# Patient Record
Sex: FEMALE | Race: BLACK OR AFRICAN AMERICAN | NOT HISPANIC OR LATINO | URBAN - METROPOLITAN AREA
[De-identification: names, ages, dates, MRNs, and addresses within clinical notes are randomized per-mention and may not be internally consistent; named-entity substitution may affect disease eponyms.]

---

## 2020-11-07 ENCOUNTER — INPATIENT (INPATIENT)
Facility: HOSPITAL | Age: 65
LOS: 4 days | Discharge: HOME | DRG: 65 | End: 2020-11-12
Attending: HOSPITALIST | Admitting: HOSPITALIST
Payer: MEDICARE

## 2020-11-07 VITALS
DIASTOLIC BLOOD PRESSURE: 103 MMHG | HEIGHT: 71 IN | TEMPERATURE: 98 F | SYSTOLIC BLOOD PRESSURE: 202 MMHG | OXYGEN SATURATION: 100 % | WEIGHT: 175.05 LBS | HEART RATE: 85 BPM | RESPIRATION RATE: 18 BRPM

## 2020-11-07 PROCEDURE — 99291 CRITICAL CARE FIRST HOUR: CPT

## 2020-11-07 NOTE — ED ADULT TRIAGE NOTE - CHIEF COMPLAINT QUOTE
patient brought in by family states bilateral heaviness to legs started in AM,  left arm weakness, feels like she can't grab her words, no droop noted, family states well 2300, patient brought to [facility] for evaluation of stroke patient brought in by family states bilateral heaviness to legs started in AM,  left arm weakness, feels like she can't grab her words, no droop noted, family states well 2300, patient brought to [facility] for evaluation of stroke, HX of DM patient brought in by family states bilateral heaviness to legs started in AM,  left arm weakness, feels like she can't grab her words, no droop noted, family states well 2300, patient brought to [facility] for evaluation of stroke, activated, HX of DM

## 2020-11-08 DIAGNOSIS — R53.1 WEAKNESS: ICD-10-CM

## 2020-11-08 LAB
A1C WITH ESTIMATED AVERAGE GLUCOSE RESULT: 7.9 % — HIGH (ref 4–5.6)
ALBUMIN SERPL ELPH-MCNC: 4.1 G/DL — SIGNIFICANT CHANGE UP (ref 3.3–5.2)
ALBUMIN SERPL ELPH-MCNC: 4.4 G/DL — SIGNIFICANT CHANGE UP (ref 3.3–5.2)
ALP SERPL-CCNC: 91 U/L — SIGNIFICANT CHANGE UP (ref 40–120)
ALP SERPL-CCNC: 93 U/L — SIGNIFICANT CHANGE UP (ref 40–120)
ALT FLD-CCNC: 12 U/L — SIGNIFICANT CHANGE UP
ALT FLD-CCNC: 13 U/L — SIGNIFICANT CHANGE UP
ANION GAP SERPL CALC-SCNC: 12 MMOL/L — SIGNIFICANT CHANGE UP (ref 5–17)
ANION GAP SERPL CALC-SCNC: 12 MMOL/L — SIGNIFICANT CHANGE UP (ref 5–17)
APPEARANCE UR: CLEAR — SIGNIFICANT CHANGE UP
APTT BLD: 34.5 SEC — SIGNIFICANT CHANGE UP (ref 27.5–35.5)
APTT BLD: 34.6 SEC — SIGNIFICANT CHANGE UP (ref 27.5–35.5)
AST SERPL-CCNC: 15 U/L — SIGNIFICANT CHANGE UP
AST SERPL-CCNC: 15 U/L — SIGNIFICANT CHANGE UP
BACTERIA # UR AUTO: ABNORMAL
BASOPHILS # BLD AUTO: 0.04 K/UL — SIGNIFICANT CHANGE UP (ref 0–0.2)
BASOPHILS # BLD AUTO: 0.05 K/UL — SIGNIFICANT CHANGE UP (ref 0–0.2)
BASOPHILS NFR BLD AUTO: 0.4 % — SIGNIFICANT CHANGE UP (ref 0–2)
BASOPHILS NFR BLD AUTO: 0.4 % — SIGNIFICANT CHANGE UP (ref 0–2)
BILIRUB SERPL-MCNC: <0.2 MG/DL — LOW (ref 0.4–2)
BILIRUB SERPL-MCNC: <0.2 MG/DL — LOW (ref 0.4–2)
BILIRUB UR-MCNC: NEGATIVE — SIGNIFICANT CHANGE UP
BLD GP AB SCN SERPL QL: SIGNIFICANT CHANGE UP
BUN SERPL-MCNC: 13 MG/DL — SIGNIFICANT CHANGE UP (ref 8–20)
BUN SERPL-MCNC: 16 MG/DL — SIGNIFICANT CHANGE UP (ref 8–20)
CALCIUM SERPL-MCNC: 9.5 MG/DL — SIGNIFICANT CHANGE UP (ref 8.6–10.2)
CALCIUM SERPL-MCNC: 9.9 MG/DL — SIGNIFICANT CHANGE UP (ref 8.6–10.2)
CHLORIDE SERPL-SCNC: 100 MMOL/L — SIGNIFICANT CHANGE UP (ref 98–107)
CHLORIDE SERPL-SCNC: 98 MMOL/L — SIGNIFICANT CHANGE UP (ref 98–107)
CHOLEST SERPL-MCNC: 202 MG/DL — HIGH
CO2 SERPL-SCNC: 25 MMOL/L — SIGNIFICANT CHANGE UP (ref 22–29)
CO2 SERPL-SCNC: 26 MMOL/L — SIGNIFICANT CHANGE UP (ref 22–29)
COLOR SPEC: YELLOW — SIGNIFICANT CHANGE UP
CREAT SERPL-MCNC: 0.76 MG/DL — SIGNIFICANT CHANGE UP (ref 0.5–1.3)
CREAT SERPL-MCNC: 0.93 MG/DL — SIGNIFICANT CHANGE UP (ref 0.5–1.3)
DIFF PNL FLD: ABNORMAL
EOSINOPHIL # BLD AUTO: 0.08 K/UL — SIGNIFICANT CHANGE UP (ref 0–0.5)
EOSINOPHIL # BLD AUTO: 0.13 K/UL — SIGNIFICANT CHANGE UP (ref 0–0.5)
EOSINOPHIL NFR BLD AUTO: 0.8 % — SIGNIFICANT CHANGE UP (ref 0–6)
EOSINOPHIL NFR BLD AUTO: 1 % — SIGNIFICANT CHANGE UP (ref 0–6)
EPI CELLS # UR: SIGNIFICANT CHANGE UP
ESTIMATED AVERAGE GLUCOSE: 180 MG/DL — HIGH (ref 68–114)
GLUCOSE BLDC GLUCOMTR-MCNC: 235 MG/DL — HIGH (ref 70–99)
GLUCOSE BLDC GLUCOMTR-MCNC: 278 MG/DL — HIGH (ref 70–99)
GLUCOSE BLDC GLUCOMTR-MCNC: 279 MG/DL — HIGH (ref 70–99)
GLUCOSE SERPL-MCNC: 220 MG/DL — HIGH (ref 70–99)
GLUCOSE SERPL-MCNC: 250 MG/DL — HIGH (ref 70–99)
GLUCOSE UR QL: 250 MG/DL
HCT VFR BLD CALC: 34.7 % — SIGNIFICANT CHANGE UP (ref 34.5–45)
HCT VFR BLD CALC: 36.9 % — SIGNIFICANT CHANGE UP (ref 34.5–45)
HCV AB S/CO SERPL IA: 0.07 S/CO — SIGNIFICANT CHANGE UP (ref 0–0.99)
HCV AB SERPL-IMP: SIGNIFICANT CHANGE UP
HDLC SERPL-MCNC: 39 MG/DL — LOW
HGB BLD-MCNC: 10.9 G/DL — LOW (ref 11.5–15.5)
HGB BLD-MCNC: 11.2 G/DL — LOW (ref 11.5–15.5)
IMM GRANULOCYTES NFR BLD AUTO: 0.2 % — SIGNIFICANT CHANGE UP (ref 0–1.5)
IMM GRANULOCYTES NFR BLD AUTO: 0.3 % — SIGNIFICANT CHANGE UP (ref 0–1.5)
INR BLD: 1 RATIO — SIGNIFICANT CHANGE UP (ref 0.88–1.16)
INR BLD: 1.05 RATIO — SIGNIFICANT CHANGE UP (ref 0.88–1.16)
KETONES UR-MCNC: NEGATIVE — SIGNIFICANT CHANGE UP
LEUKOCYTE ESTERASE UR-ACNC: NEGATIVE — SIGNIFICANT CHANGE UP
LIPID PNL WITH DIRECT LDL SERPL: 103 MG/DL — HIGH
LYMPHOCYTES # BLD AUTO: 3.65 K/UL — HIGH (ref 1–3.3)
LYMPHOCYTES # BLD AUTO: 35 % — SIGNIFICANT CHANGE UP (ref 13–44)
LYMPHOCYTES # BLD AUTO: 56.1 % — HIGH (ref 13–44)
LYMPHOCYTES # BLD AUTO: 6.99 K/UL — HIGH (ref 1–3.3)
MCHC RBC-ENTMCNC: 26.4 PG — LOW (ref 27–34)
MCHC RBC-ENTMCNC: 26.6 PG — LOW (ref 27–34)
MCHC RBC-ENTMCNC: 30.4 GM/DL — LOW (ref 32–36)
MCHC RBC-ENTMCNC: 31.4 GM/DL — LOW (ref 32–36)
MCV RBC AUTO: 84.6 FL — SIGNIFICANT CHANGE UP (ref 80–100)
MCV RBC AUTO: 87 FL — SIGNIFICANT CHANGE UP (ref 80–100)
MONOCYTES # BLD AUTO: 0.96 K/UL — HIGH (ref 0–0.9)
MONOCYTES # BLD AUTO: 1.2 K/UL — HIGH (ref 0–0.9)
MONOCYTES NFR BLD AUTO: 9.2 % — SIGNIFICANT CHANGE UP (ref 2–14)
MONOCYTES NFR BLD AUTO: 9.6 % — SIGNIFICANT CHANGE UP (ref 2–14)
NEUTROPHILS # BLD AUTO: 4.05 K/UL — SIGNIFICANT CHANGE UP (ref 1.8–7.4)
NEUTROPHILS # BLD AUTO: 5.66 K/UL — SIGNIFICANT CHANGE UP (ref 1.8–7.4)
NEUTROPHILS NFR BLD AUTO: 32.7 % — LOW (ref 43–77)
NEUTROPHILS NFR BLD AUTO: 54.3 % — SIGNIFICANT CHANGE UP (ref 43–77)
NITRITE UR-MCNC: NEGATIVE — SIGNIFICANT CHANGE UP
NON HDL CHOLESTEROL: 163 MG/DL — HIGH
PH UR: 7 — SIGNIFICANT CHANGE UP (ref 5–8)
PLATELET # BLD AUTO: 282 K/UL — SIGNIFICANT CHANGE UP (ref 150–400)
PLATELET # BLD AUTO: 299 K/UL — SIGNIFICANT CHANGE UP (ref 150–400)
POTASSIUM SERPL-MCNC: 4.4 MMOL/L — SIGNIFICANT CHANGE UP (ref 3.5–5.3)
POTASSIUM SERPL-MCNC: 4.4 MMOL/L — SIGNIFICANT CHANGE UP (ref 3.5–5.3)
POTASSIUM SERPL-SCNC: 4.4 MMOL/L — SIGNIFICANT CHANGE UP (ref 3.5–5.3)
POTASSIUM SERPL-SCNC: 4.4 MMOL/L — SIGNIFICANT CHANGE UP (ref 3.5–5.3)
PROT SERPL-MCNC: 8 G/DL — SIGNIFICANT CHANGE UP (ref 6.6–8.7)
PROT SERPL-MCNC: 8.2 G/DL — SIGNIFICANT CHANGE UP (ref 6.6–8.7)
PROT UR-MCNC: 100 MG/DL
PROTHROM AB SERPL-ACNC: 11.6 SEC — SIGNIFICANT CHANGE UP (ref 10.6–13.6)
PROTHROM AB SERPL-ACNC: 12.2 SEC — SIGNIFICANT CHANGE UP (ref 10.6–13.6)
RBC # BLD: 4.1 M/UL — SIGNIFICANT CHANGE UP (ref 3.8–5.2)
RBC # BLD: 4.24 M/UL — SIGNIFICANT CHANGE UP (ref 3.8–5.2)
RBC # FLD: 13.6 % — SIGNIFICANT CHANGE UP (ref 10.3–14.5)
RBC # FLD: 13.7 % — SIGNIFICANT CHANGE UP (ref 10.3–14.5)
RBC CASTS # UR COMP ASSIST: SIGNIFICANT CHANGE UP /HPF (ref 0–4)
SARS-COV-2 IGG SERPL QL IA: NEGATIVE — SIGNIFICANT CHANGE UP
SARS-COV-2 IGM SERPL IA-ACNC: 0.09 INDEX — SIGNIFICANT CHANGE UP
SARS-COV-2 RNA SPEC QL NAA+PROBE: SIGNIFICANT CHANGE UP
SODIUM SERPL-SCNC: 135 MMOL/L — SIGNIFICANT CHANGE UP (ref 135–145)
SODIUM SERPL-SCNC: 138 MMOL/L — SIGNIFICANT CHANGE UP (ref 135–145)
SP GR SPEC: 1 — LOW (ref 1.01–1.02)
TRIGL SERPL-MCNC: 300 MG/DL — HIGH
TROPONIN T SERPL-MCNC: <0.01 NG/ML — SIGNIFICANT CHANGE UP (ref 0–0.06)
TSH SERPL-MCNC: 0.9 UIU/ML — SIGNIFICANT CHANGE UP (ref 0.27–4.2)
UROBILINOGEN FLD QL: NEGATIVE MG/DL — SIGNIFICANT CHANGE UP
WBC # BLD: 10.42 K/UL — SIGNIFICANT CHANGE UP (ref 3.8–10.5)
WBC # BLD: 12.45 K/UL — HIGH (ref 3.8–10.5)
WBC # FLD AUTO: 10.42 K/UL — SIGNIFICANT CHANGE UP (ref 3.8–10.5)
WBC # FLD AUTO: 12.45 K/UL — HIGH (ref 3.8–10.5)
WBC UR QL: SIGNIFICANT CHANGE UP

## 2020-11-08 PROCEDURE — 12345: CPT | Mod: NC

## 2020-11-08 PROCEDURE — 93010 ELECTROCARDIOGRAM REPORT: CPT

## 2020-11-08 PROCEDURE — 0042T: CPT

## 2020-11-08 PROCEDURE — 70498 CT ANGIOGRAPHY NECK: CPT | Mod: 26

## 2020-11-08 PROCEDURE — 93880 EXTRACRANIAL BILAT STUDY: CPT | Mod: 26

## 2020-11-08 PROCEDURE — 93306 TTE W/DOPPLER COMPLETE: CPT | Mod: 26

## 2020-11-08 PROCEDURE — 99223 1ST HOSP IP/OBS HIGH 75: CPT

## 2020-11-08 PROCEDURE — 70496 CT ANGIOGRAPHY HEAD: CPT | Mod: 26

## 2020-11-08 PROCEDURE — 70551 MRI BRAIN STEM W/O DYE: CPT | Mod: 26

## 2020-11-08 PROCEDURE — 71045 X-RAY EXAM CHEST 1 VIEW: CPT | Mod: 26

## 2020-11-08 RX ORDER — DEXTROSE 50 % IN WATER 50 %
15 SYRINGE (ML) INTRAVENOUS ONCE
Refills: 0 | Status: DISCONTINUED | OUTPATIENT
Start: 2020-11-08 | End: 2020-11-12

## 2020-11-08 RX ORDER — ATORVASTATIN CALCIUM 80 MG/1
80 TABLET, FILM COATED ORAL AT BEDTIME
Refills: 0 | Status: DISCONTINUED | OUTPATIENT
Start: 2020-11-08 | End: 2020-11-12

## 2020-11-08 RX ORDER — ENOXAPARIN SODIUM 100 MG/ML
40 INJECTION SUBCUTANEOUS DAILY
Refills: 0 | Status: DISCONTINUED | OUTPATIENT
Start: 2020-11-08 | End: 2020-11-12

## 2020-11-08 RX ORDER — LISINOPRIL 2.5 MG/1
5 TABLET ORAL DAILY
Refills: 0 | Status: DISCONTINUED | OUTPATIENT
Start: 2020-11-09 | End: 2020-11-12

## 2020-11-08 RX ORDER — FLUOXETINE HCL 10 MG
20 CAPSULE ORAL DAILY
Refills: 0 | Status: DISCONTINUED | OUTPATIENT
Start: 2020-11-08 | End: 2020-11-12

## 2020-11-08 RX ORDER — METOPROLOL TARTRATE 50 MG
25 TABLET ORAL DAILY
Refills: 0 | Status: DISCONTINUED | OUTPATIENT
Start: 2020-11-08 | End: 2020-11-08

## 2020-11-08 RX ORDER — LABETALOL HCL 100 MG
20 TABLET ORAL
Refills: 0 | Status: DISCONTINUED | OUTPATIENT
Start: 2020-11-08 | End: 2020-11-08

## 2020-11-08 RX ORDER — DEXTROSE 50 % IN WATER 50 %
25 SYRINGE (ML) INTRAVENOUS ONCE
Refills: 0 | Status: DISCONTINUED | OUTPATIENT
Start: 2020-11-08 | End: 2020-11-12

## 2020-11-08 RX ORDER — INSULIN LISPRO 100/ML
5 VIAL (ML) SUBCUTANEOUS
Refills: 0 | Status: DISCONTINUED | OUTPATIENT
Start: 2020-11-08 | End: 2020-11-10

## 2020-11-08 RX ORDER — SODIUM CHLORIDE 9 MG/ML
1000 INJECTION, SOLUTION INTRAVENOUS
Refills: 0 | Status: DISCONTINUED | OUTPATIENT
Start: 2020-11-08 | End: 2020-11-12

## 2020-11-08 RX ORDER — CLOPIDOGREL BISULFATE 75 MG/1
75 TABLET, FILM COATED ORAL DAILY
Refills: 0 | Status: DISCONTINUED | OUTPATIENT
Start: 2020-11-08 | End: 2020-11-08

## 2020-11-08 RX ORDER — PANTOPRAZOLE SODIUM 20 MG/1
40 TABLET, DELAYED RELEASE ORAL
Refills: 0 | Status: DISCONTINUED | OUTPATIENT
Start: 2020-11-08 | End: 2020-11-12

## 2020-11-08 RX ORDER — ASPIRIN/CALCIUM CARB/MAGNESIUM 324 MG
81 TABLET ORAL DAILY
Refills: 0 | Status: DISCONTINUED | OUTPATIENT
Start: 2020-11-08 | End: 2020-11-12

## 2020-11-08 RX ORDER — LABETALOL HCL 100 MG
10 TABLET ORAL EVERY 6 HOURS
Refills: 0 | Status: DISCONTINUED | OUTPATIENT
Start: 2020-11-08 | End: 2020-11-08

## 2020-11-08 RX ORDER — GLUCAGON INJECTION, SOLUTION 0.5 MG/.1ML
1 INJECTION, SOLUTION SUBCUTANEOUS ONCE
Refills: 0 | Status: DISCONTINUED | OUTPATIENT
Start: 2020-11-08 | End: 2020-11-12

## 2020-11-08 RX ORDER — INSULIN LISPRO 100/ML
VIAL (ML) SUBCUTANEOUS
Refills: 0 | Status: DISCONTINUED | OUTPATIENT
Start: 2020-11-08 | End: 2020-11-12

## 2020-11-08 RX ORDER — DEXTROSE 50 % IN WATER 50 %
12.5 SYRINGE (ML) INTRAVENOUS ONCE
Refills: 0 | Status: DISCONTINUED | OUTPATIENT
Start: 2020-11-08 | End: 2020-11-12

## 2020-11-08 RX ORDER — ASPIRIN/CALCIUM CARB/MAGNESIUM 324 MG
325 TABLET ORAL ONCE
Refills: 0 | Status: COMPLETED | OUTPATIENT
Start: 2020-11-08 | End: 2020-11-08

## 2020-11-08 RX ORDER — ATORVASTATIN CALCIUM 80 MG/1
40 TABLET, FILM COATED ORAL AT BEDTIME
Refills: 0 | Status: DISCONTINUED | OUTPATIENT
Start: 2020-11-08 | End: 2020-11-08

## 2020-11-08 RX ORDER — LABETALOL HCL 100 MG
10 TABLET ORAL EVERY 6 HOURS
Refills: 0 | Status: DISCONTINUED | OUTPATIENT
Start: 2020-11-08 | End: 2020-11-12

## 2020-11-08 RX ORDER — ASPIRIN/CALCIUM CARB/MAGNESIUM 324 MG
325 TABLET ORAL DAILY
Refills: 0 | Status: DISCONTINUED | OUTPATIENT
Start: 2020-11-08 | End: 2020-11-08

## 2020-11-08 RX ORDER — LABETALOL HCL 100 MG
10 TABLET ORAL ONCE
Refills: 0 | Status: COMPLETED | OUTPATIENT
Start: 2020-11-08 | End: 2020-11-08

## 2020-11-08 RX ADMIN — Medication 2: at 12:05

## 2020-11-08 RX ADMIN — Medication 10 MILLIGRAM(S): at 06:13

## 2020-11-08 RX ADMIN — ENOXAPARIN SODIUM 40 MILLIGRAM(S): 100 INJECTION SUBCUTANEOUS at 12:04

## 2020-11-08 RX ADMIN — Medication 325 MILLIGRAM(S): at 01:09

## 2020-11-08 RX ADMIN — Medication 10 MILLIGRAM(S): at 07:41

## 2020-11-08 RX ADMIN — Medication 5 UNIT(S): at 17:35

## 2020-11-08 RX ADMIN — Medication 81 MILLIGRAM(S): at 12:04

## 2020-11-08 RX ADMIN — Medication 3: at 07:42

## 2020-11-08 RX ADMIN — ATORVASTATIN CALCIUM 80 MILLIGRAM(S): 80 TABLET, FILM COATED ORAL at 21:30

## 2020-11-08 RX ADMIN — Medication 3: at 17:35

## 2020-11-08 RX ADMIN — Medication 20 MILLIGRAM(S): at 12:11

## 2020-11-08 NOTE — H&P ADULT - NSHPSOCIALHISTORY_GEN_ALL_CORE
Denies current ETOH, Smoking, or Drugs  Hx of Previous smoker of +20 yrs, smoking 1-2 cigars daily. Pt stopped smoking 2 yrs ago.   Lives at home with family.  Denies travel, sick contact.

## 2020-11-08 NOTE — ED ADULT NURSE NOTE - CHIEF COMPLAINT QUOTE
patient brought in by family states bilateral heaviness to legs started in AM,  left arm weakness, feels like she can't grab her words, no droop noted, family states well 2300, patient brought to [facility] for evaluation of stroke, activated, HX of DM

## 2020-11-08 NOTE — ED PROVIDER NOTE - CLINICAL SUMMARY MEDICAL DECISION MAKING FREE TEXT BOX
patient with left sided weakness, LKW 11am with ambulation difficulty upon awakening.  ct non-con and ct a/p with no abnormalities. reviewed case with stroke physician who states not TPA candidate and not interventional candidate at this time.  will admit to stroke unit.

## 2020-11-08 NOTE — H&P ADULT - ASSESSMENT
66 y/o F with DM is brought over by family to [facility] ED due to pt having weakness in legs this AM and sudden L-sided weakness starting around 8 PM. Daughter corroborates story. Pt began feeling heaviness in both legs around 11 AM, (LKW) Pt then had a sudden event around 8-9 PM while patient was sitting on her chair and began to drift to her left side (Of note, daughter attested the event initiated at 7 PM to ED dept). Pt had noted left sided facial, arm and leg weakness with slurring speech. Pt was taken to [facility] around 11:50 PM, Stroke code initiated. BP was 202/109. CT head revealed chronic ischemic changes, no acute hemorrhage or ischemic penumbra. Moderate stenosis of the ICA origin due to calcified and noncalcified atheromatous plaque. Pt was not a tPA candidate, was given aspirin 325 mg. Neuro consult ongoing. Pt will be admitted to [facility] for stroke management. COVID pending.     Stroke  -Noted L facial, UE, LE deficits  -BP: 202/109 on admission, (Hypertensive emergency)  -CT head: chronic ischemic changes, no acute hemorrhage, LVO or ischemic penumbra. Moderate stenosis of the ICA origin due to calcified and noncalcified atheromatous plaques  -Not a TP candidate, pt >4.5 hrs since initiation of symptoms  -given aspirin 325 mg PO x1  -c/w permissive HTN, labetalol 20 mg IV to keep BP between 200/120 and 180/90  -c/w aspirin 325  -start clopidogrel 75 mg PO daily  -start atorvastatin 40 mg PO daily  -start flouxetine 20 mg PO daily  -f/u lipid panel, HgbA1c, troponins, echocardiogram, carotid doppler  -neuro checks q 4 hrs, stroke unit  -NPO except meds  -start pantoprazole 40 mg PO daily  -Bed rest with head elevated 30 degrees  -Fall and aspiration Precautions, bed alarm  -Speech and swallow, dysphagia screen  -Neuro consult  -Cardio consult  -PT/OT/SW/CM consult    DM  -Hold diabetic meds during hospitalization  -Hypoglycemic precautions  -Hyperglycemic corrective scale  -f/u HgbA1c    VTE  -Hold AC for 24 hrs    Dispo:  -Admit    Consults:  -Cardio, Neuro    Code Status:  -Full Code    Communication:  -Pt gives permission to update daughter of medical updates on a regularly basis.   -Daugther updated at bedside of current patient's condition and plan. All questions answered.      66 y/o F with DM is brought over by family to [facility] ED due to pt having weakness in legs this AM and sudden L-sided weakness starting around 8 PM. Daughter corroborates story. Pt began feeling heaviness in both legs around 11 AM, (LKW) Pt then had a sudden event around 8-9 PM while patient was sitting on her chair and began to drift to her left side (Of note, daughter attested the event initiated at 7 PM to ED dept). Pt had noted left sided facial, arm and leg weakness with slurring speech. Pt was taken to [facility] around 11:50 PM, Stroke code initiated. BP was 202/109. CT head revealed chronic ischemic changes, no acute hemorrhage or ischemic penumbra. Moderate stenosis of the ICA origin due to calcified and noncalcified atheromatous plaque. Pt was not a tPA candidate, was given aspirin 325 mg. Neuro consult ongoing. Pt will be admitted to [facility] for stroke management. COVID pending.     Stroke  -Noted L facial, UE, LE deficits  -BP: 202/109 on admission, (Hypertensive emergency)  -CT head: chronic ischemic changes, no acute hemorrhage, LVO or ischemic penumbra. Moderate stenosis of the ICA origin due to calcified and noncalcified atheromatous plaques  -Not a TP candidate, pt >4.5 hrs since initiation of symptoms  -given aspirin 325 mg PO x1  -labetalol 10 mg IVx1  -labetalol 10 mg IV q 6hr for SBP>180  -c/w aspirin 81  -start atorvastatin 80 mg PO daily  -start flouxetine 20 mg PO daily  -f/u lipid panel, HgbA1c, troponins, echocardiogram, carotid doppler  -neuro checks q 4 hrs, stroke unit  -NPO except meds  -start pantoprazole 40 mg PO daily  -Bed rest with head elevated 30 degrees  -Fall and aspiration Precautions, bed alarm  -Speech and swallow, dysphagia screen  -Neuro consult  -Cardio consult  -PT/OT/SW/CM consult    DM  -Hold diabetic meds during hospitalization  -Hypoglycemic precautions  -Hyperglycemic corrective scale  -f/u HgbA1c    HTN  -no home meds  -labetalol 10 mg IVx1  -labetalol 10 mg IV q 6hr for SBP>180    VTE  -Hold AC for 24 hrs    Dispo:  -Admit    Consults:  -Cardio, Neuro    Code Status:  -Full Code    Communication:  -Pt gives permission to update daughter of medical updates on a regularly basis.   -Lvther updated at bedside of current patient's condition and plan. All questions answered.      66 y/o F with DM is brought over by family to [facility] ED due to pt having weakness in legs this AM and sudden L-sided weakness starting around 8 PM. Daughter corroborates story. Pt began feeling heaviness in both legs around 11 AM, (LKW) Pt then had a sudden event around 8-9 PM while patient was sitting on her chair and began to drift to her left side (Of note, daughter attested the event initiated at 7 PM to ED dept). Pt had noted left sided facial, arm and leg weakness with slurring speech. Pt was taken to [facility] around 11:50 PM, Stroke code initiated. BP was 202/109. CT head revealed chronic ischemic changes, no acute hemorrhage or ischemic penumbra. Moderate stenosis of the ICA origin due to calcified and noncalcified atheromatous plaque. Pt was not a tPA candidate, was given aspirin 325 mg. Neuro consult ongoing. Pt will be admitted to [facility] for stroke management. COVID pending.     Stroke  -Noted L facial, UE, LE deficits  -BP: 202/109 on admission, (Hypertensive emergency)  -CT head: chronic ischemic changes, no acute hemorrhage, LVO or ischemic penumbra. Moderate stenosis of the ICA origin due to calcified and noncalcified atheromatous plaques  -Not a TP candidate, pt >4.5 hrs since initiation of symptoms  -given aspirin 325 mg PO x1  -labetalol 10 mg IVx1  -labetalol 10 mg IV q 6hr for SBP>180  -c/w aspirin 81  -start atorvastatin 80 mg PO daily  -start flouxetine 20 mg PO daily  -f/u lipid panel, HgbA1c, troponins, echocardiogram, carotid doppler  -neuro checks q 4 hrs, stroke unit  -NPO except meds  -start pantoprazole 40 mg PO daily  -Bed rest with head elevated 30 degrees  -Fall and aspiration Precautions, bed alarm  -Speech and swallow, dysphagia screen  -Neuro consult  -Cardio consult  -PT/OT/SW/CM consult    DM  -Hold diabetic meds during hospitalization  -Hypoglycemic precautions  -Hyperglycemic corrective scale  -f/u HgbA1c    HTN  -no home meds  -labetalol 10 mg IVx1  -labetalol 10 mg IV q 6hr for SBP>180  -f/u UA for proteinuria, consider ACEi or ARB    VTE  -Hold AC for 24 hrs    Dispo:  -Admit    Consults:  -Cardio, Neuro    Code Status:  -Full Code    Communication:  -Pt gives permission to update daughter of medical updates on a regularly basis.   -Daugther updated at bedside of current patient's condition and plan. All questions answered.      64 y/o F with DM is brought over by family to [facility] ED due to pt having weakness in legs this AM and sudden L-sided weakness starting around 8 PM. Daughter corroborates story. Pt began feeling heaviness in both legs around 11 AM, (LKW) Pt then had a sudden event around 8-9 PM while patient was sitting on her chair and began to drift to her left side (Of note, daughter attested the event initiated at 7 PM to ED dept). Pt had noted left sided facial, arm and leg weakness with slurring speech. Pt was taken to [facility] around 11:50 PM, Stroke code initiated. BP was 202/109. CT head revealed chronic ischemic changes, no acute hemorrhage or ischemic penumbra. Moderate stenosis of the ICA origin due to calcified and noncalcified atheromatous plaque. Pt was not a tPA candidate, was given aspirin 325 mg. Neuro consult ongoing. Pt will be admitted to [facility] for stroke management. COVID pending.     Stroke  -Noted L facial, UE, LE deficits  -BP: 202/109 on admission, (Hypertensive emergency)  -CT head: chronic ischemic changes, no acute hemorrhage, LVO or ischemic penumbra. Moderate stenosis of the ICA origin due to calcified and noncalcified atheromatous plaques  -Not a TP candidate, pt >4.5 hrs since initiation of symptoms  -EKG: NSR  -given aspirin 325 mg PO x1  -labetalol 10 mg IVx1  -labetalol 10 mg IV q 6hr for SBP>180  -c/w aspirin 81; consider starting clopidogrel   -start atorvastatin 80 mg PO daily  -start flouxetine 20 mg PO daily  -f/u lipid panel, HgbA1c, troponins, echocardiogram, carotid doppler  -neuro checks q 4 hrs, stroke unit  -NPO except meds  -start pantoprazole 40 mg PO daily  -Bed rest with head elevated 30 degrees  -Fall and aspiration Precautions, bed alarm  -Speech and swallow, dysphagia screen  -Neuro consult  -Cardio consult  -PT/OT/SW/CM consult    DM  -Hold diabetic meds during hospitalization  -Hypoglycemic precautions  -Hyperglycemic corrective scale  -f/u HgbA1c    HTN  -no home meds  -labetalol 10 mg IVx1  -labetalol 10 mg IV q 6hr for SBP>180  -f/u UA for proteinuria, consider ACEi or ARB    VTE  -Hold AC for 24 hrs    Dispo:  -Admit    Consults:  -Cardio, Neuro    Code Status:  -Full Code    Communication:  -Pt gives permission to update daughter of medical updates on a regularly basis.   -Daugther updated at bedside of current patient's condition and plan. All questions answered.

## 2020-11-08 NOTE — SWALLOW BEDSIDE ASSESSMENT ADULT - SLP GENERAL OBSERVATIONS
Pt received awake and alert, upright on stretcher in ED +L facial droop +dysarthric, pain level 0/10 via verbal pain scale, family members at bedside

## 2020-11-08 NOTE — SWALLOW BEDSIDE ASSESSMENT ADULT - ASR SWALLOW ASPIRATION MONITOR
oral hygiene/position upright (90Y)/fever/gurgly voice/pneumonia/cough/throat clearing/upper respiratory infection/change of breathing pattern

## 2020-11-08 NOTE — H&P ADULT - NSHPPHYSICALEXAM_GEN_ALL_CORE
Vital Signs Last 24 Hrs  T(C): 36.5 (07 Nov 2020 23:53), Max: 36.5 (07 Nov 2020 23:53)  T(F): 97.7 (07 Nov 2020 23:53), Max: 97.7 (07 Nov 2020 23:53)  HR: 87 (08 Nov 2020 01:47) (85 - 90)  BP: 191/81 (08 Nov 2020 03:34) (181/82 - 202/103)  RR: 18 (08 Nov 2020 01:47) (18 - 18)  SpO2: 100% (08 Nov 2020 01:47) (100% - 100%)    Const: Awake, alert and oriented x3. In No Acute Distress. Well appearing.  HEENT: NC/AT, PERRLA, EOMI, clear nares, Moist mucous membranes, normal oropharynx. No erythema, lesions, secretions.   Neck: Soft and supple. Full ROM without pain.  Cardiovascular: Regular rate and regular rhythm. +S1/S2. No murmurs. Peripheral pulses 2+ and symmetric x4. No LE edema.  Respiratory: Speaking in full sentences. No evidence of respiratory distress. CTAB. No wheezes, crackles, rales or rhonchi.  Abd: Flat/Obese Abdomen, Normal bowel sounds in all 4 quadrants, Soft, non-distended. non-tender. No guarding or rebound. Negative Shaver, McBurney, Rovsing.   Back: Spine midline, non-tender. No muscular spasms. No CVA Tenderness.  Skin: No rashes, discolorations, abrasions, lacerations, lesions, ulcers noted.   Lymph: No cervical, axillary or inguinal lymphadenopathy noted.  Neuro: Awake, alert & oriented x 3, PERRLA, EOMI, Noted bilateral cataracts and senile arches, Noted left sided facial weakness, left sided lip, tongue, pharyngeal deviation. Left sided weakness in UE and LE (3/5) with deviation, able to raise arm up to 30 degrees. Noted limited finger extension. R UE and LE with normal strength (5/5) and no deviation. Decreased sensation in left UE and LE. Gait not assessed. Vital Signs Last 24 Hrs  T(C): 36.5 (07 Nov 2020 23:53), Max: 36.5 (07 Nov 2020 23:53)  T(F): 97.7 (07 Nov 2020 23:53), Max: 97.7 (07 Nov 2020 23:53)  HR: 87 (08 Nov 2020 01:47) (85 - 90)  BP: 191/81 (08 Nov 2020 03:34) (181/82 - 202/103)  RR: 18 (08 Nov 2020 01:47) (18 - 18)  SpO2: 100% (08 Nov 2020 01:47) (100% - 100%)    Const: Awake, alert and oriented x3. In No Acute Distress. Well appearing.  HEENT: NC/AT, PERRLA, EOMI, clear nares, Moist mucous membranes, normal oropharynx. No erythema, lesions, secretions.   Neck: Soft and supple. Full ROM without pain.  Cardiovascular: Regular rate and regular rhythm. +S1/S2, Noted harsh systolic aortic murmur (III/VI), Peripheral pulses 2+ and symmetric x4. No LE edema.  Respiratory: Speaking in full sentences. No evidence of respiratory distress. CTAB. No wheezes, crackles, rales or rhonchi.  Abd: Flat/Obese Abdomen, Normal bowel sounds in all 4 quadrants, Soft, non-distended. non-tender. No guarding or rebound. Negative Shaver, McBurney, Rovsing.   Back: Spine midline, non-tender. No muscular spasms. No CVA Tenderness.  Skin: No rashes, discolorations, abrasions, lacerations, lesions, ulcers noted.   Lymph: No cervical, axillary or inguinal lymphadenopathy noted.  Neuro: Awake, alert & oriented x 3, PERRLA, EOMI, Noted bilateral cataracts and senile arches, Noted left sided facial weakness, left sided lip, tongue, pharyngeal deviation. Left sided weakness in UE and LE (3/5) with deviation, able to raise arm up to 30 degrees. Noted limited finger extension. R UE and LE with normal strength (5/5) and no deviation. Decreased sensation in left UE and LE. Gait not assessed.

## 2020-11-08 NOTE — CHART NOTE - NSCHARTNOTEFT_GEN_A_CORE
Called for code stroke by Dr Mclaughlin  Left sided weakness started at 1900  NIHSS=3 per Dr Mclaughlin  CT head did not show CVA, mass or blood  CT Perfusion head - CBF<30% volume 0ml, Tmax>6s volume =0ml  CTA head did not show LVO    She presented greater than 4.5 hours after onset of symptoms therefore not a candidate for alteplase  She had no ischemic penumbra or LVO and was not a candidate for anticoagulation.    Suggest admit to stroke unit for stroke workup  formal neurology to follow later in the morning    discussed with Dr Mclaughlin    Thank you for allowing me to participate in the care of your patient    Lewis Lee MD, PhD   [id number]

## 2020-11-08 NOTE — ED ADULT NURSE NOTE - NSIMPLEMENTINTERV_GEN_ALL_ED
Implemented All Fall with Harm Risk Interventions:  [place] to call system. Call bell, personal items and telephone within reach. Instruct patient to call for assistance. Room bathroom lighting operational. Non-slip footwear when patient is off stretcher. Physically safe environment: no spills, clutter or unnecessary equipment. Stretcher in lowest position, wheels locked, appropriate side rails in place. Provide visual cue, wrist band, yellow gown, etc. Monitor gait and stability. Monitor for mental status changes and reorient to person, place, and time. Review medications for side effects contributing to fall risk. Reinforce activity limits and safety measures with patient and family. Provide visual clues: red socks.

## 2020-11-08 NOTE — ED PROVIDER NOTE - NS ED ROS FT
Constitutional: (-) fever  (-)chills  (-)sweats  Eyes/ENT: (-)   Cardiovascular: (-) chest pain, (-) palpitations (-) edema   Respiratory: (-) cough, (-) shortness of breath   Gastrointestinal: (-)nausea  (-)vomiting, (-) diarrhea  (-) abdominal pain   :  (-)dysuria, (-)frequency, (-)urgency, (-)hematuria  Musculoskeletal: (-) neck pain, (-) back pain, (-) joint pain  Integumentary: (-) rash, (-) edema  Neurological: (-) headache, (-) altered mental status  (-)LOC  +weakness

## 2020-11-08 NOTE — H&P ADULT - NSHPREVIEWOFSYSTEMS_GEN_ALL_CORE
Const: Denies fever, chills, malaise, fatigue, night sweats  HEENT: Denies changes in vision, sore throat  Neck: Denies neck pain/stiffness  Resp: Denies coughing, sneezing, SOB  Cardiovascular: Denies CP, palpitations, LE edema  GI: Denies nausea, vomiting, abdominal pain, diarrhea, constipation, blood in stool  : Denies urinary frequency/urgency/dysuria, hematuria  MSK: Denies back pain  Neuro: Has Left sided weakness and bilateral leg weakness. Denies HA, dizziness, numbness, LOC  Skin: Denies rashes

## 2020-11-08 NOTE — CHART NOTE - NSCHARTNOTEFT_GEN_A_CORE
Pt is seen examined in am , Creole speaking nurse and family at the bedside she prefers them translate   Pt is with ;eft sided weakness , started to feel some cramps in the leg yesterday around noon initially than she had left sided weakness in the evening     she is also with speech problems , had expression difficulty talking per daughter noticed when she was talking to her on the phone     denies any vision problems   no HA     Vital Signs Last 24 Hrs  T(C): 36.8 (08 Nov 2020 07:39), Max: 36.8 (08 Nov 2020 07:39)  T(F): 98.2 (08 Nov 2020 07:39), Max: 98.2 (08 Nov 2020 07:39)  HR: 73 (08 Nov 2020 09:06) (71 - 90)  BP: 177/79 (08 Nov 2020 09:06) (141/65 - 216/102)  BP(mean): 114 (08 Nov 2020 09:06) (94 - 125)  RR: 18 (08 Nov 2020 09:06) (15 - 18)  SpO2: 100% (08 Nov 2020 09:06) (100% - 100%)    Lungs : CTA bilateral   Heart : regular s1 /s2   Abd : soft no tenderness , BS positive   Ext : no pretibial edema   Neuro : awake alert oriented x3 , left facial weakness , MS LUE and LLE 0/5 , RLE RUE normal strenght   speech thick slow   cb< from: CT Angio Neck w/ IV Cont (11.08.20 @ 00:22) >    IMPRESSION:    CT HEAD: No acute intracranial bleeding.    CT PERFUSION: No regional perfusion abnormality.    CTA BRAIN: No flow-limiting stenosis or occlusion.    Nondominant left vertebral artery terminates as PICA, anatomic variant..      < end of copied text >    a/p     1- Acute stroke with left sided weakness   cont aspirin , statin , monitor BP permissive HTN first 24 hours   will start po meds in am with holding parameters avoid hypotension   TTE ,MR of brain ordered   diet started     2- Hypertensive urgency , new dx   will start oral medications as needed in 24 hours   avoid hypotension     3- Diabetes Mellitus type 2 , uncontrolled   cont ISS , add premeals insulin   not on insulin regularly , ran out of it not refilled   called pharmacy no recent records of insulin prescrition provided   will hold oral diabetic medications   diet     4* Hyperlipidemia   cont diet and statin high dose     d/w family and neurologist   acute rehab evaluation , PT

## 2020-11-08 NOTE — H&P ADULT - HISTORY OF PRESENT ILLNESS
64 y/o F with DM is brought over by family to [facility] ED due to pt having weakness in legs this AM and sudden L-sided weakness starting around 8 PM. Daughter corroborates story. Pt began feeling heaviness in both legs around 11 AM. Pt then had a sudden event around 8-9 PM while patient was sitting on her chair and began to drift to her left side (Of note, daughter attested the event initiated at 7 PM to ED dept). Family caught pt. Pt had noted left sided facial, arm and leg weakness with slurring speech. Pt was taken to [facility] around 11:50 PM, Stroke code initiated. BP was 202/109. CT head revealed chronic ischemic changes, no acute hemorrhage or ischemic penumbra. Moderate stenosis of the ICA origin due to calcified and noncalcified atheromatous plaque. Pt was not a tPA candidate, was given aspirin 325 mg. Neuro consult ongoing. Pt will be admitted to [facility] for stroke management.     Denies fever/chills/malaise, changes of vision, CP, SOB, n/v/d/c, LOC, sick contacts.   COVID-19 test pending results. 66 y/o F with DM is brought over by family to [facility] ED due to pt having weakness in legs this AM and sudden L-sided weakness starting around 8 PM. Daughter corroborates story. Pt began feeling heaviness in both legs around 11 AM, (LKW). Pt then had a sudden event around 8-9 PM while patient was sitting on her chair and began to drift to her left side (Of note, daughter attested the event initiated at 7 PM to ED dept). Family caught pt from falling. Pt had noted left sided facial, arm and leg weakness with slurring speech. Pt was taken to [facility] around 11:50 PM, Stroke code initiated. BP was 202/109. CT head revealed chronic ischemic changes, no acute hemorrhage or ischemic penumbra. Moderate stenosis of the ICA origin due to calcified and noncalcified atheromatous plaque. Pt was not a tPA candidate, was given aspirin 325 mg. Neuro consult ongoing. Pt will be admitted to [facility] for stroke management.     Denies fever/chills/malaise, changes of vision, CP, SOB, n/v/d/c, LOC, sick contacts.   COVID-19 test pending results.

## 2020-11-08 NOTE — ED ADULT NURSE NOTE - OBJECTIVE STATEMENT
patient brought in by family states bilateral heaviness to legs started in AM,  left arm weakness, feels like she can't grab her words, no droop noted, family states well 2300, patient brought to [facility] for evaluation of stroke, activated, HX of DM patient brought in by family states bilateral heaviness to legs started in AM,  left arm weakness, feels like she can't grab her words, no droop noted, family states well 2300, patient brought to [facility] for evaluation of stroke, activated, HX of DM. Pts LKW is 1900 11/7/20 patient brought in by family states bilateral heaviness to legs started in AM,  left arm weakness, feels like she can't grab her words, no droop noted, family states well 2300, patient brought to [facility] for evaluation of stroke, activated, HX of DM. Pts LKW is 1900 11/7/20. Pt noted to have upper left and lower left extremity weakness with right facial droop. Code stroke called, priority CT initiated.

## 2020-11-08 NOTE — ED PROVIDER NOTE - PHYSICAL EXAMINATION
Gen: Alert, NAD  Head: NC, AT, PERRL, EOMI, normal lids/conjunctiva  ENT: B TM WNL, normal hearing, patent oropharynx without erythema/exudate, uvula midline  Neck: +supple, no tenderness/meningismus/JVD, +Trachea midline  Pulm: Bilateral BS, normal resp effort, no wheeze/stridor/retractions  CV: RRR, no M/R/G, 2+dist pulses  Abd: soft, NT/ND, +BS, no hepatosplenomegaly  Mskel: ROM intact x4 extremities.  no edema/erythema/cyanosis  Skin: no rash, warm, dry  Neuro: see stroke note below

## 2020-11-08 NOTE — H&P ADULT - NSHPPOAPULMEMBOLUS_GEN_A_CORE
The patient now is following up with  in [facility] with [facility].  Patient is not following up with [facility]
History of facial surgery    [place] teeth extracted
no

## 2020-11-08 NOTE — SWALLOW BEDSIDE ASSESSMENT ADULT - SWALLOW EVAL: DIAGNOSIS
Mild oral dysphagia marked by increased mastication time and A-P transit time with regular solids. Pharyngeal stage judged to be functional for soft solids and thin liquids

## 2020-11-08 NOTE — ED ADULT NURSE NOTE - CHPI ED NUR SYMPTOMS NEG
no blurred vision/no change in level of consciousness/no numbness/no nausea/no loss of consciousness/no vomiting/no confusion/no dizziness/no fever

## 2020-11-08 NOTE — ED ADULT NURSE REASSESSMENT NOTE - NS ED NURSE REASSESS COMMENT FT1
/81. Dr. crockett in attendance and aware.
Assumed care of pt from previous RN. Pt currently a&OX3, in no acute distress. Denies pain or other complaints at this time. Let sided facial droop, left arm weakness and BL leg weakness noted. /81, MD aware. Pt's daughter at bedside; both updated on plan of care.
Alert and oriented to person, place and time

## 2020-11-09 LAB
A1C WITH ESTIMATED AVERAGE GLUCOSE RESULT: 7.9 % — HIGH (ref 4–5.6)
ESTIMATED AVERAGE GLUCOSE: 180 MG/DL — HIGH (ref 68–114)
GLUCOSE BLDC GLUCOMTR-MCNC: 160 MG/DL — HIGH (ref 70–99)
GLUCOSE BLDC GLUCOMTR-MCNC: 197 MG/DL — HIGH (ref 70–99)
GLUCOSE BLDC GLUCOMTR-MCNC: 220 MG/DL — HIGH (ref 70–99)
GLUCOSE BLDC GLUCOMTR-MCNC: 243 MG/DL — HIGH (ref 70–99)

## 2020-11-09 PROCEDURE — 99233 SBSQ HOSP IP/OBS HIGH 50: CPT

## 2020-11-09 PROCEDURE — 99223 1ST HOSP IP/OBS HIGH 75: CPT

## 2020-11-09 RX ORDER — INSULIN GLARGINE 100 [IU]/ML
10 INJECTION, SOLUTION SUBCUTANEOUS AT BEDTIME
Refills: 0 | Status: DISCONTINUED | OUTPATIENT
Start: 2020-11-09 | End: 2020-11-10

## 2020-11-09 RX ADMIN — LISINOPRIL 5 MILLIGRAM(S): 2.5 TABLET ORAL at 05:22

## 2020-11-09 RX ADMIN — ATORVASTATIN CALCIUM 80 MILLIGRAM(S): 80 TABLET, FILM COATED ORAL at 21:43

## 2020-11-09 RX ADMIN — Medication 5 UNIT(S): at 12:04

## 2020-11-09 RX ADMIN — Medication 20 MILLIGRAM(S): at 09:59

## 2020-11-09 RX ADMIN — Medication 81 MILLIGRAM(S): at 09:59

## 2020-11-09 RX ADMIN — Medication 1: at 12:04

## 2020-11-09 RX ADMIN — Medication 1: at 16:26

## 2020-11-09 RX ADMIN — ENOXAPARIN SODIUM 40 MILLIGRAM(S): 100 INJECTION SUBCUTANEOUS at 09:59

## 2020-11-09 RX ADMIN — INSULIN GLARGINE 10 UNIT(S): 100 INJECTION, SOLUTION SUBCUTANEOUS at 21:43

## 2020-11-09 RX ADMIN — Medication 2: at 07:59

## 2020-11-09 RX ADMIN — Medication 5 UNIT(S): at 07:59

## 2020-11-09 RX ADMIN — Medication 5 UNIT(S): at 16:26

## 2020-11-09 RX ADMIN — PANTOPRAZOLE SODIUM 40 MILLIGRAM(S): 20 TABLET, DELAYED RELEASE ORAL at 05:22

## 2020-11-09 NOTE — PROGRESS NOTE ADULT - ASSESSMENT
64 yo F with Diabetes Mellitus type2 admitted for left sided weakness slurred speech , on arrivial BP is high   MR of brain positive for Right lenticular/ corona radiata stroke     1- Acute R CVA   with left sided weakness   cont aspirin , statin   BP control, strict BG control  TTE result reviewed   neurology follow up for  further  testing ?     2- Hypertensive urgency, improving   cont lisinopril   avoid hypotension     3- Diabetes Mellitus type 2 , uncontrolled   cont ISS , added pre meals insulin and lantus   will hold oral diabetic medications   diet     4* Hyperlipidemia   cont diet and statin high dose     d/w family and neurologist   acute rehab evaluation , PT.

## 2020-11-09 NOTE — PHYSICAL THERAPY INITIAL EVALUATION ADULT - PERTINENT HX OF CURRENT PROBLEM, REHAB EVAL
64 y/o F with DM is brought over by family to [facility] ED due to pt having weakness in legs this AM and sudden L-sided weakness starting around 8 PM. Daughter corroborates story. Pt began feeling heaviness in both legs around 11 AM, (LKW) Pt then had a sudden event around 8-9 PM while patient was sitting on her chair and began to drift to her left side. pt noted to have right lenticular nucleus/corona radiata infarct with left hemiparesis.

## 2020-11-09 NOTE — PHYSICAL THERAPY INITIAL EVALUATION ADULT - IMPAIRED TRANSFERS: SIT/STAND, REHAB EVAL
impaired balance/abnormal muscle tone/decreased strength/impaired motor control/impaired postural control

## 2020-11-09 NOTE — PHYSICAL THERAPY INITIAL EVALUATION ADULT - ACTIVE RANGE OF MOTION EXAMINATION, REHAB EVAL
no active movement noted left UE; left LE movement limited by decreased strength. see MMT below./Right LE Active ROM was WFL (within functional limits)/Right UE Active ROM was WFL (within functional limits)

## 2020-11-09 NOTE — PHYSICAL THERAPY INITIAL EVALUATION ADULT - CRITERIA FOR SKILLED THERAPEUTIC INTERVENTIONS
risk reduction/prevention/therapy frequency/functional limitations in following categories/rehab potential/anticipated equipment needs at discharge/impairments found/anticipated discharge recommendation

## 2020-11-09 NOTE — CONSULT NOTE ADULT - SUBJECTIVE AND OBJECTIVE BOX
Cardiology Consult    CHIEF COMPLAINT: Left sided weakness      HISTORY OF PRESENT ILLNESS: AMRIK BOCANEGRA is a 65y old female with a history of NIDDM,GERD who presents with left sided weakness,facial droop,dysphasia.She denies significant chest pain,palpitations,has mild shortness of breath with exertion.    PROBLEM LIST:    PAST MEDICAL HISTORY:  GERD (gastroesophageal reflux disease)    DM (diabetes mellitus)      PAST SURGICAL HISTORY:  No significant past surgical history      MEDICATIONS  (STANDING):  aspirin  chewable 81 milliGRAM(s) Oral daily  atorvastatin 80 milliGRAM(s) Oral at bedtime  dextrose 5%. 1000 milliLiter(s) (50 mL/Hr) IV Continuous <Continuous>  dextrose 50% Injectable 12.5 Gram(s) IV Push once  dextrose 50% Injectable 25 Gram(s) IV Push once  dextrose 50% Injectable 25 Gram(s) IV Push once  enoxaparin Injectable 40 milliGRAM(s) SubCutaneous daily  FLUoxetine 20 milliGRAM(s) Oral daily  insulin lispro (ADMELOG) corrective regimen sliding scale   SubCutaneous three times a day before meals  pantoprazole    Tablet 40 milliGRAM(s) Oral before breakfast    MEDICATIONS  (PRN):  dextrose 40% Gel 15 Gram(s) Oral once PRN Blood Glucose LESS THAN 70 milliGRAM(s)/deciliter  glucagon  Injectable 1 milliGRAM(s) IntraMuscular once PRN Glucose LESS THAN 70 milligrams/deciliter  labetalol Injectable 10 milliGRAM(s) IV Push every 6 hours PRN Systolic blood pressure >180    ALLERGIES:  No Known Allergies    SOCIAL HISTORY:  Tobacco: no  Alcohol: no  Drugs: no    FAMILY HISTORY:  No pertinent family history in first degree relatives      REVIEW OF SYSTEMS:  Constitutional: no fatigue, no fevers/chills, no weight change  HEENT: no visual disturbances, no hearing loss  Cardiac: no chest pain, no palpitations, no orthopnea, no PND  Respiratory: no shortness of breath, no cough  GI: no abdominal pain, no blood in the stool, no N/V, no diarrhea  Urological: no dysuria, no hematuria  Hematological: no easy bruising or bleeding  Musculoskeletal: no joint pain, no back pain  Neurological: had headaches, no syncope  Psychiatric: no anxiety, no depression  Skin: no rashes    PHYSICAL EXAM:    T(C): 36.8 (20 @ 07:39), Max: 36.8 (20 @ 07:39)  T(F): 98.2 (20 @ 07:39), Max: 98.2 (20 @ 07:39)  HR: 73 (20 @ 09:06) (71 - 90)  BP: 177/79 (20 @ 09:06) (141/65 - 216/102)  RR: 18 (20 @ 09:06) (15 - 18)  SpO2: 100% (20 @ 09:06) (100% - 100%)  Wt(kg): --  Telemetry: sinus rhythm  General: comfortable, in NAD  HEENT: EOMI, normocephalic, atraumatic  Neck: no JVD, no carotid bruits  Heart: +S1S2, RRR, no murmurs, no rubs, no gallops  Lungs: clear to auscultation bilaterally, no wheezes, no rales, no rhonchi  Abdomen: soft, non-tender, non-distended, + bowel sounds  Extremities: no clubbing, no cyanosis, no edema  Vascular: 2+ dorsalis pedis pulses bilaterally  Neuro: A&O x3,left sided weakness,facial droop,dysphasia noted    EKG: sinus rhythm    LABS:    Troponin T, Serum: <0.01 ng/mL ( @ 06:25)  Troponin T, Serum: <0.01 ng/mL ( @ 00:19)                            10.9   10.42 )-----------( 282      ( 2020 06:25 )             34.7         135  |  98  |  13.0  ----------------------------<  250<H>  4.4   |  25.0  |  0.76    Ca    9.5      2020 06:25    TPro  8.0  /  Alb  4.1  /  TBili  <0.2<L>  /  DBili  x   /  AST  15  /  ALT  12  /  AlkPhos  93      PT/INR - ( 2020 06:25 )   PT: 12.2 sec;   INR: 1.05 ratio         PTT - ( 2020 06:25 )  PTT:34.5 sec  RADIOLOGY & ADDITIONAL TESTS:    ASSESSMENT:  MERITE DELYFORESTAL is a 65y old female with a history of NIDDM,GERD who presented with left sided weakness,facial droop,dysphasia.She appears to have had a CVA and was not a candidate for tpa.    Please permit me to suggest the followin. Will start a low dose beta blocker for BP control  2.Continue treatment as per neurology  3.Speech and physical therapy  
                             [facility]                                     [facility]                                 Rosa David, & Jose                                  [address]                                        [place], [place], [address]                                             [phone]    CC: stroke  HPI:  The patient is a 65y Female who presented with acute onset of left sided weakness who was called as a code stroke overnight.  The initial history I was told was that she had leg heaviness at 1100 on 11/7 and at 1900 felt left sided weakness.  Based on this information she was not a candidate for alteplase as I was called around midnight (apparently 5 hours post onset).  Per the admitting H&P the left sided weakness started at 2000 and was confirmed by her daughter.  Today I am told that the weakness started at about 2200 by her daughter and son at the bedside.  She had a head CT that did not show stroke and CTP did not show penumbra and CTA did not show LVO.  NIHSS per ED attending was 3. Today she has left sided weakness without sensory, balance or language changes.  Neurology is asked to follow.    PAST MEDICAL & SURGICAL HISTORY:  GERD (gastroesophageal reflux disease)    DM (diabetes mellitus)    No significant past surgical history        MEDICATIONS  (STANDING):  aspirin  chewable 81 milliGRAM(s) Oral daily  atorvastatin 80 milliGRAM(s) Oral at bedtime  dextrose 5%. 1000 milliLiter(s) (50 mL/Hr) IV Continuous <Continuous>  dextrose 50% Injectable 12.5 Gram(s) IV Push once  dextrose 50% Injectable 25 Gram(s) IV Push once  dextrose 50% Injectable 25 Gram(s) IV Push once  enoxaparin Injectable 40 milliGRAM(s) SubCutaneous daily  FLUoxetine 20 milliGRAM(s) Oral daily  insulin lispro (ADMELOG) corrective regimen sliding scale   SubCutaneous three times a day before meals  insulin lispro Injectable (ADMELOG) 5 Unit(s) SubCutaneous three times a day before meals  pantoprazole    Tablet 40 milliGRAM(s) Oral before breakfast    MEDICATIONS  (PRN):  dextrose 40% Gel 15 Gram(s) Oral once PRN Blood Glucose LESS THAN 70 milliGRAM(s)/deciliter  glucagon  Injectable 1 milliGRAM(s) IntraMuscular once PRN Glucose LESS THAN 70 milligrams/deciliter  labetalol Injectable 10 milliGRAM(s) IV Push every 6 hours PRN Systolic blood pressure >195      Allergies    No Known Allergies    Intolerances        SOCIAL HISTORY:  old tob,   no alcohol   no drugs    FAMILY HISTORY:  No stroke in either parent    ROS: 14 point ROS negative other than what is present in HPI or below  left sided weakness    Vital Signs Last 24 Hrs  T(C): 36.6 (08 Nov 2020 12:17), Max: 36.8 (08 Nov 2020 07:39)  T(F): 97.9 (08 Nov 2020 12:17), Max: 98.2 (08 Nov 2020 07:39)  HR: 72 (08 Nov 2020 12:17) (71 - 90)  BP: 176/74 (08 Nov 2020 12:17) (141/65 - 216/102)  BP(mean): 107 (08 Nov 2020 12:17) (94 - 125)  RR: 17 (08 Nov 2020 12:17) (15 - 18)  SpO2: 99% (08 Nov 2020 12:17) (99% - 100%)      General: NAD    Detailed Neurologic Exam:    Mental status: The patient is awake and alert and has normal attention span.  The patient is fully oriented in 3 spheres. The patient is oriented to current events. The patient is able to name objects, follow commands, repeat sentences. (+) dysarthria    Cranial nerves: Pupils equal and react symmetrically to light. There is no visual field deficit to confrontation. Extraocular motion is full with no nystagmus. There is no ptosis. Facial sensation is intact. Facial musculature is asymmetric, left central weakness. Palate elevates symmetrically. Tongue is midline.    Motor: There is normal bulk and tone.  There is no tremor.  Strength is 5/5 in the right arm and leg.   Strength is 0/5 in the left arm and 2-3/5 leg.    Sensation: Intact to light touch and pin in 4 extremities    Reflexes: 1+ throughout and plantar responses are flexor on right, equivocal left    Cerebellar: There is no dysmetria on finger to nose testing on right unable to test left with weakness    Gait : deferred    NIH SS:  DATE: 11/8/2020  TIME: 1155  1A: Level of consciousness (0-3): 0  1B: Questions (0-2): 0  1C: Commands (0-2): 0  2: Gaze (0-2): 0  3: Visual fields (0-3): 0  4: Facial palsy (0-3): 2  MOTOR:  5A: Left arm motor drift (0-4): 4  5B: Right arm motor drift (0-4): 0  6A: Left leg motor drift (0-4): 3  6B: Right leg motor drift (0-4): 0  7: Limb ataxia (0-2): 0  SENSORY:  8: Sensation (0-2): 0  SPEECH:  9: Language (0-3): 0  10: Dysarthria (0-2): 1  EXTINCTION:  11: Extinction/inattention (0-2): 0    TOTAL SCORE: 10    prehospital mRS= 0      LABS:                         10.9   10.42 )-----------( 282      ( 08 Nov 2020 06:25 )             34.7       11-08    135  |  98  |  13.0  ----------------------------<  250<H>  4.4   |  25.0  |  0.76    Ca    9.5      08 Nov 2020 06:25    TPro  8.0  /  Alb  4.1  /  TBili  <0.2<L>  /  DBili  x   /  AST  15  /  ALT  12  /  AlkPhos  93  11-08      PT/INR - ( 08 Nov 2020 06:25 )   PT: 12.2 sec;   INR: 1.05 ratio         PTT - ( 08 Nov 2020 06:25 )  PTT:34.5 sec    Lipid Profile (11.08.20 @ 06:25)    Cholesterol, Serum: 202 mg/dL    Triglycerides, Serum: 300: Lipemic. Interpret with caution mg/dL    HDL Cholesterol, Serum: 39 mg/dL    Non HDL Cholesterol: 163:    LDL Cholesterol Calculated: 103 mg/dL        RADIOLOGY & ADDITIONAL STUDIES (independently reviewed unless otherwise noted):  CT head: no CVA, mass or blood, (+) SVID  CTA head: no aneurysm, AVM, LVO or sig stenosis in COW  CTA neck: no vertebral stenosis.  Right ICA 50-60% stenosis at origin. left ICA <50% stenosis at origin  CT Perfusion head - CBF<30% volume 0ml, Tmax>6s volume =0ml
65yF was admitted on  with left sided weakness.     Imaging reviewed  showed:  CT HEAD: No acute intracranial bleeding.   CT PERFUSION: No regional perfusion abnormality.  CTA BRAIN: No flow-limiting stenosis or occlusion. Nondominant left vertebral artery terminates as PICA, anatomic variant..  CTA NECK: Moderate stenosis of the ICA origin due to calcified and noncalcified atheromatous plaque.    MRI HEAD  - Parenchymal volume loss and chronic microvascular ischemic changes are identified. Abnormal T2 prolongation restricted diffusion is seen involving the right lenticular nucleus/corona radiata region. This is compatible with an acute infarct. No hemorrhagic transformation is seen. No significant shift or herniation is seen. The large vessels demonstrate normal flow voids. The visualized paranasal sinuses mastoid eminence appear clear. IMPRESSION: Acute infarct as described above.    Patient has daughter at bedside. She has some movement of the left LE but none in the upper.   Mild dysarthria noted. Able to follow commands.     REVIEW OF SYSTEMS  Constitutional - No fever, No weight loss, No fatigue  HEENT - No eye pain, No visual disturbances, No difficulty hearing, No tinnitus, No vertigo, No neck pain  Respiratory - No cough, No wheezing, No shortness of breath  Cardiovascular - No chest pain, No palpitations  Gastrointestinal - No abdominal pain, No nausea, No vomiting, No diarrhea, No constipation  Genitourinary - No dysuria, No frequency, No hematuria, No incontinence  Neurological - No headaches, No memory loss, +loss of strength, No numbness, No tremors  Skin - No itching, No rashes, No lesions   Endocrine - No temperature intolerance  Musculoskeletal - No joint pain, No joint swelling, No muscle pain  Psychiatric - No depression, No anxiety    VITALS  T(C): 36.7 (20 @ 11:02), Max: 37.3 (20 @ 23:44)  HR: 71 (20 @ 11:02) (71 - 84)  BP: 143/82 (20 @ 11:02) (138/65 - 183/80)  RR: 18 (20 @ 11:02) (16 - 20)  SpO2: 98% (20 @ 11:02) (93% - 100%)  Wt(kg): --    PAST MEDICAL & SURGICAL HISTORY  GERD (gastroesophageal reflux disease)    DM (diabetes mellitus)    No significant past surgical history        SOCIAL HISTORY - as per documentation/history  Smoking - None  EtOH - None  Drugs - None    FUNCTIONAL HISTORY  Lives with family, 2 [facility]  Independent    CURRENT FUNCTIONAL STATUS    Bed Mobility: Scooting/Bridging:     · Level of [place]	maximum assist (25% patients effort)  · Physical Assist/Nonphysical Assist	2 person assist    Bed Mobility: Sit to Supine:     · Level of [place]	maximum assist (25% patients effort)  · Physical Assist/Nonphysical Assist	2 person assist    Bed Mobility: Supine to Sit:     · Level of [place]	maximum assist (25% patients effort)  · Physical Assist/Nonphysical Assist	2 person assist    Bed Mobility Analysis:     · Bed Mobility Limitations	decreased ability to use arms for pushing/pulling; decreased ability to use legs for bridging/pushing; impaired ability to control trunk for mobility  · Impairments Contributing to Impaired Bed Mobility	impaired balance; decreased strength; impaired motor control; abnormal muscle tone    Transfer: Bed to Chair:     Transfer Skill: Bed to Chair   · Level of [place]	unable to perform; safety concerns due to mobility status    Transfer: Chair to Bed:     · Level of [place]	unable to perform; safety concerns due to mobility status    Transfer: Sit to Stand:     · Level of [place]	maximum assist (25% patients effort)  · Physical Assist/Nonphysical Assist	2 person assist  · Assistive Device	manual assist x2 blocking left knee    Transfer: Stand to Sit:     · Level of [place]	maximum assist (25% patients effort)  · Physical Assist/Nonphysical Assist	2 person assist  · Assistive Device	manual assist x2 blocking left knee    Sit/Stand Transfer Safety Analysis:     · Transfer Safety Concerns Noted	decreased weight-shifting ability  · Impairments Contributing to Impaired Transfers	impaired balance; abnormal muscle tone; decreased strength; impaired postural control; impaired motor control    Gait Skills:     · Level of [place]	pt unable to weightshift to take a step        FAMILY HISTORY   No pertinent family history in first degree relatives        RECENT LABS - Reviewed  CBC Full  -  ( 2020 06:25 )  WBC Count : 10.42 K/uL  RBC Count : 4.10 M/uL  Hemoglobin : 10.9 g/dL  Hematocrit : 34.7 %  Platelet Count - Automated : 282 K/uL  Mean Cell Volume : 84.6 fl  Mean Cell Hemoglobin : 26.6 pg  Mean Cell Hemoglobin Concentration : 31.4 gm/dL  Auto Neutrophil # : 5.66 K/uL  Auto Lymphocyte # : 3.65 K/uL  Auto Monocyte # : 0.96 K/uL  Auto Eosinophil # : 0.08 K/uL  Auto Basophil # : 0.04 K/uL  Auto Neutrophil % : 54.3 %  Auto Lymphocyte % : 35.0 %  Auto Monocyte % : 9.2 %  Auto Eosinophil % : 0.8 %  Auto Basophil % : 0.4 %        135  |  98  |  13.0  ----------------------------<  250<H>  4.4   |  25.0  |  0.76    Ca    9.5      2020 06:25    TPro  8.0  /  Alb  4.1  /  TBili  <0.2<L>  /  DBili  x   /  AST  15  /  ALT  12  /  AlkPhos  93      Urinalysis Basic - ( 2020 06:29 )    Color: Yellow / Appearance: Clear / S.005 / pH: x  Gluc: x / Ketone: Negative  / Bili: Negative / Urobili: Negative mg/dL   Blood: x / Protein: 100 mg/dL / Nitrite: Negative   Leuk Esterase: Negative / RBC: 0-2 /HPF / WBC 0-2   Sq Epi: x / Non Sq Epi: Occasional / Bacteria: Occasional        ALLERGIES  No Known Allergies      MEDICATIONS   aspirin  chewable 81 milliGRAM(s) Oral daily  atorvastatin 80 milliGRAM(s) Oral at bedtime  dextrose 40% Gel 15 Gram(s) Oral once PRN  dextrose 5%. 1000 milliLiter(s) IV Continuous <Continuous>  dextrose 50% Injectable 12.5 Gram(s) IV Push once  dextrose 50% Injectable 25 Gram(s) IV Push once  dextrose 50% Injectable 25 Gram(s) IV Push once  enoxaparin Injectable 40 milliGRAM(s) SubCutaneous daily  FLUoxetine 20 milliGRAM(s) Oral daily  glucagon  Injectable 1 milliGRAM(s) IntraMuscular once PRN  insulin glargine Injectable (LANTUS) 10 Unit(s) SubCutaneous at bedtime  insulin lispro (ADMELOG) corrective regimen sliding scale   SubCutaneous three times a day before meals  insulin lispro Injectable (ADMELOG) 5 Unit(s) SubCutaneous three times a day before meals  labetalol Injectable 10 milliGRAM(s) IV Push every 6 hours PRN  lisinopril 5 milliGRAM(s) Oral daily  pantoprazole    Tablet 40 milliGRAM(s) Oral before breakfast      ----------------------------------------------------------------------------------------  PHYSICAL EXAM  Constitutional - NAD, Comfortable  HEENT - NCAT, EOMI  Neck - Supple, No limited ROM  Chest - Breathing comfortably, No wheezing  Cardiovascular - S1S2   Abdomen - Soft   Extremities - No C/C/E, No calf tenderness   Neurologic Exam -                    Cognitive - Awake, Alert, AAO to self, situation     Communication - Fluent, +dysarthria     Cranial Nerves - Left lip droop     Motor -                     LEFT    UE - ShAB 0/5, EF 0/5, EE 0/5, WE 0/5,  0/5                    RIGHT UE - ShAB 5/5, EF 5/5, EE 5/5, WE 5/5,  5/5                    LEFT    LE - HF 1/5, KE 2/5, DF 0/5, PF 0/5                    RIGHT LE - HF 5/5, KE 5/5, DF 5/5, PF 5/5        Sensory - Intact to LT  Psychiatric - Mood saddened  ----------------------------------------------------------------------------------------  ASSESSMENT/PLAN  65yFemale with functional deficits after an acute CVA  Acute Right lenticular nucleus/corona radiata - ASA, TTE  DM2 - Lantus  Oral Dyshagia - Soft/Thins  Pain - Tylenol  DVT PPX - SCDs, Lovenox  Rehab - Workup pending. Will need AR. Will continue to follow. Functional progress will determine ongoing rehab dispo recommendations, which may change.    Continue bedside therapy as well as OOB throughout the day with mobilization by staff to maintain cardiopulmonary function and prevention of secondary complications related to debility.     Discussed with rehab team.

## 2020-11-09 NOTE — PHYSICAL THERAPY INITIAL EVALUATION ADULT - MANUAL MUSCLE TESTING RESULTS, REHAB EVAL
right shoulder flex 5/5, elbow flex 5/5, hip flex, 4+/5, knee ext 4+/5, ankle df 5/5; left UE no active movement; left hip flex 2-/5, knee flex 2-/5, knee ext 2-/5, ankle 0/5

## 2020-11-09 NOTE — PHYSICAL THERAPY INITIAL EVALUATION ADULT - PLANNED THERAPY INTERVENTIONS, PT EVAL
gait training/balance training/neuromuscular re-education/transfer training/bed mobility training/strengthening

## 2020-11-09 NOTE — PROGRESS NOTE ADULT - SUBJECTIVE AND OBJECTIVE BOX
[facility]                                        Neurology at [place]                                 Rosa David, & Jose                                  [address]                                        [place], [place], [address]                                             [phone]        CC: stroke     HPI:   The patient is a 65y Female who presented with acute onset of left sided weakness who was called as a code stroke overnight.  The initial history I was told was that she had leg heaviness at 1100 on 11/7 and at 1900 felt left sided weakness.  Based on this information she was not a candidate for alteplase as I was called around midnight (apparently 5 hours post onset).  Per the admitting H&P the left sided weakness started at 2000 and was confirmed by her daughter.  Today I am told that the weakness started at about 2200 by her daughter and son at the bedside.  She had a head CT that did not show stroke and CTP did not show penumbra and CT-A did not show large vessel occlusion.  NIH SS per ED attending was 3. Today she has left sided weakness without sensory, balance or language changes.    Interim history:  Remains in ER awaiting bed.     ROS:   Denies headache or dizziness.  Denies chest pain.  Denies shortness of breath.    MEDICATIONS  (STANDING):  aspirin  chewable 81 milliGRAM(s) Oral daily  atorvastatin 80 milliGRAM(s) Oral at bedtime  dextrose 5%. 1000 milliLiter(s) (50 mL/Hr) IV Continuous <Continuous>  enoxaparin Injectable 40 milliGRAM(s) SubCutaneous daily  FLUoxetine 20 milliGRAM(s) Oral daily  insulin glargine Injectable (LANTUS) 10 Unit(s) SubCutaneous at bedtime  insulin lispro (ADMELOG) corrective regimen sliding scale   SubCutaneous three times a day before meals  insulin lispro Injectable (ADMELOG) 5 Unit(s) SubCutaneous three times a day before meals  lisinopril 5 milliGRAM(s) Oral daily  pantoprazole    Tablet 40 milliGRAM(s) Oral before breakfast      Vital Signs Last 24 Hrs  T(C): 36.7 (09 Nov 2020 11:02), Max: 37.3 (08 Nov 2020 23:44)  T(F): 98.1 (09 Nov 2020 11:02), Max: 99.2 (08 Nov 2020 23:44)  HR: 71 (09 Nov 2020 11:02) (71 - 84)  BP: 143/82 (09 Nov 2020 11:02) (138/65 - 183/80)  BP(mean): 115 (08 Nov 2020 14:20) (115 - 115)  RR: 18 (09 Nov 2020 11:02) (16 - 20)  SpO2: 98% (09 Nov 2020 11:02) (93% - 100%)    Detailed Neurologic Exam:    Mental status: The patient is awake and alert. There is no aphasia. (Creole).     Cranial nerves: Pupils equal and react symmetrically to light. There is no visual field deficit to threat. Extraocular motion is full with no nystagmus. There is no ptosis. Facial sensation is intact. Facial musculature is asymmetric with a depression of the left nasolabial fold. Palate elevates symmetrically. Tongue is midline.    Motor: There is normal bulk and tone.  There is no tremor.  Strength is 5/5 in the right arm and leg.   Strength is 0/5 in the left arm and 2-3/5 leg.    Sensation: Grossly intact to light touch and pin.    Reflexes: 1+ throughout and plantar responses are flexor.    Cerebellar: No dysmetria on finger nose testing on the right.     Labs:     11-08    135  |  98  |  13.0  ----------------------------<  250<H>  4.4   |  25.0  |  0.76    Ca    9.5      08 Nov 2020 06:25    TPro  8.0  /  Alb  4.1  /  TBili  <0.2<L>  /  DBili  x   /  AST  15  /  ALT  12  /  AlkPhos  93  11-08                            10.9   10.42 )-----------( 282      ( 08 Nov 2020 06:25 )             34.7       Rad:   MRI brain images reviewed (and concur with report): There is acute infarct in the right lentiform nucleus and corona radiata.

## 2020-11-09 NOTE — PHYSICAL THERAPY INITIAL EVALUATION ADULT - ADDITIONAL COMMENTS
history provided by daughter, sandor. pt lives with sandor and her family in a 1-story house with 2 steps to enter (no rail). pt was independent with all mobility without devices prior to admit. daughter and family work different hours. other family around, but daughter unsure if 24 hour assist can be provided. no DME.

## 2020-11-09 NOTE — PROGRESS NOTE ADULT - SUBJECTIVE AND OBJECTIVE BOX
Cardiology Follow-up    AMRIK BOCANEGRA was seen and examined. No events overnight. The patient denies any chest pain, SOB, palpitations, orthopnea, PND or abdominal pain.Her left side is still flaccid.Her echo reveals normal LV systolic function,no intracavitary thrombus noted.    PROBLEM LIST:    PAST MEDICAL HISTORY:  GERD (gastroesophageal reflux disease)    DM (diabetes mellitus)      PAST SURGICAL HISTORY:  No significant past surgical history      MEDICATIONS  (STANDING):  aspirin  chewable 81 milliGRAM(s) Oral daily  atorvastatin 80 milliGRAM(s) Oral at bedtime  dextrose 5%. 1000 milliLiter(s) (50 mL/Hr) IV Continuous <Continuous>  dextrose 50% Injectable 12.5 Gram(s) IV Push once  dextrose 50% Injectable 25 Gram(s) IV Push once  dextrose 50% Injectable 25 Gram(s) IV Push once  enoxaparin Injectable 40 milliGRAM(s) SubCutaneous daily  FLUoxetine 20 milliGRAM(s) Oral daily  insulin lispro (ADMELOG) corrective regimen sliding scale   SubCutaneous three times a day before meals  insulin lispro Injectable (ADMELOG) 5 Unit(s) SubCutaneous three times a day before meals  lisinopril 5 milliGRAM(s) Oral daily  pantoprazole    Tablet 40 milliGRAM(s) Oral before breakfast    MEDICATIONS  (PRN):  dextrose 40% Gel 15 Gram(s) Oral once PRN Blood Glucose LESS THAN 70 milliGRAM(s)/deciliter  glucagon  Injectable 1 milliGRAM(s) IntraMuscular once PRN Glucose LESS THAN 70 milligrams/deciliter  labetalol Injectable 10 milliGRAM(s) IV Push every 6 hours PRN Systolic blood pressure >195    ALLERGIES:  No Known Allergies    PHYSICAL EXAM:  T(C): 36.7 (20 @ 07:43), Max: 37.3 (20 @ 23:44)  T(F): 98 (20 @ :43), Max: 99.2 (20 @ 23:44)  HR: 74 (20 @ 07:43) (72 - 84)  BP: 142/70 (20 @ 07:43) (138/65 - 183/80)  RR: 18 (20 @ 07:43) (16 - 20)  SpO2: 95% (20 @ 07:43) (93% - 100%)  Wt(kg): --  I&O's Summary    Telemetry: sinus rhythm  General: comfortable, in NAD  Heart: +S1S2, RRR, no murmurs, no rubs, no gallops  Lungs: clear to auscultation bilaterally, no wheezes, no rales, no rhonchi  Abdomen: soft, non-tender, non-distended, + bowel sounds  Extremities: no clubbing, no cyanosis, no edema  Neuro: A&O x3,lefy side flaccid    LABS:    Troponin T, Serum: <0.01 ng/mL ( @ 15:45)  Troponin T, Serum: <0.01 ng/mL ( @ 06:25)  Troponin T, Serum: <0.01 ng/mL ( @ 00:19)                            10.9   10.42 )-----------( 282      ( 2020 06:25 )             34.7         135  |  98  |  13.0  ----------------------------<  250<H>  4.4   |  25.0  |  0.76    Ca    9.5      2020 06:25    TPro  8.0  /  Alb  4.1  /  TBili  <0.2<L>  /  DBili  x   /  AST  15  /  ALT  12  /  AlkPhos  93  -    PT/INR - ( 2020 06:25 )   PT: 12.2 sec;   INR: 1.05 ratio         PTT - ( 2020 06:25 )  PTT:34.5 sec  RADIOLOGY & ADDITIONAL TESTS:    ASSESSMENT:  AMRIK BOCANEGRA is a 65y old female with a history of NIDDM,GERD who presented with a CVA.Her left side is still flaccid.    Please permit me to suggest the followin. Continue treatment as per neurology  2.She will require,specch therapy,PT  3.Will follow prn

## 2020-11-09 NOTE — PHYSICAL THERAPY INITIAL EVALUATION ADULT - GENERAL OBSERVATIONS, REHAB EVAL
awake on stretcher on room air, +telemonitor, +primafit. daughter, sandor, at bedside and interpreted for pt, who declined interpretation services per [facility] creole panchito(#[id number])

## 2020-11-09 NOTE — PROGRESS NOTE ADULT - SUBJECTIVE AND OBJECTIVE BOX
Patient is a 65y old  Female who presents with a chief complaint of left sided weakness     follow up this am . daughter is at the bedside  she prefers to translate   pt is awake alert , no distress  ,;eft sided weakness same   speech better improving slightly per daughter         Allergies    No Known Allergies    Intolerances        REVIEW OF SYSTEMS:  left sided weakness , slurred speech and facial weakness   otherwise neg       Vital Signs Last 24 Hrs  T(C): 36.7 (2020 07:43), Max: 37.3 (2020 23:44)  T(F): 98 (2020 07:43), Max: 99.2 (2020 23:44)  HR: 74 (2020 07:43) (72 - 84)  BP: 142/70 (2020 07:43) (138/65 - 183/80)  BP(mean): 115 (2020 14:20) (107 - 115)  RR: 18 (2020 07:43) (16 - 20)  SpO2: 95% (2020 07:43) (93% - 100%)    PHYSICAL EXAM:    GENERAL: NAD, well-groomed  NERVOUS SYSTEM:  Alert & Oriented X3, ;eft sided plegia , facial droop , decreased sensation in LLE and LUE   unable to move left side  CHEST/LUNG: Clear to auscultation  bilaterally; No rales, rhonchi  HEART: Regular rate and rhythm; s1 /s2 No murmurs, rubs, or gallops  ABDOMEN: Soft, Nontender, Nondistended; Bowel sounds present  EXTREMITIES: No clubbing, cyanosis, or edema  SKIN: No rash       LABS:                        10.9   10.42 )-----------( 282      ( 2020 06:25 )             34.7     11-08    135  |  98  |  13.0  ----------------------------<  250<H>  4.4   |  25.0  |  0.76    Ca    9.5      2020 06:25    TPro  8.0  /  Alb  4.1  /  TBili  <0.2<L>  /  DBili  x   /  AST  15  /  ALT  12  /  AlkPhos  93  11-08    PT/INR - ( 2020 06:25 )   PT: 12.2 sec;   INR: 1.05 ratio         PTT - ( 2020 06:25 )  PTT:34.5 sec  Urinalysis Basic - ( 2020 06:29 )    Color: Yellow / Appearance: Clear / S.005 / pH: x  Gluc: x / Ketone: Negative  / Bili: Negative / Urobili: Negative mg/dL   Blood: x / Protein: 100 mg/dL / Nitrite: Negative   Leuk Esterase: Negative / RBC: 0-2 /HPF / WBC 0-2   Sq Epi: x / Non Sq Epi: Occasional / Bacteria: Occasional        RADIOLOGY & ADDITIONAL TESTS:  mr< from: MR Head No Cont (20 @ 17:14) >    Abnormal T2 prolongation restricted diffusion is seen involving the right lenticular nucleus/corona radiata region. This is compatible with an acute infarct. No hemorrhagic transformation is seen. No significant shift or herniation is seen.    < end of copied text >  < from: MR Head No Cont (20 @ 17:14) >    Abnormal T2 prolongation restricted diffusion is seen involving the right lenticular nucleus/corona radiata region. This is compatible with an acute infarct. No hemorrhagic transformation is seen. No significant shift or herniation is seen.    < end of copied text >

## 2020-11-09 NOTE — PROGRESS NOTE ADULT - ASSESSMENT
The patient is a 65 year old woman with multiple risk factors for stroke.   Now presents with stroke.     Stroke.   LDL: 103  Goal: < 70  Continue antiplatelet and statin.   Given risk factors would continue with treatment.   Do not feel MARIAH and loop needed at this time.     Hypertension   Control blood pressure per Medicine.     Diabetes Mellitus   Control blood sugar.     Discharge planning.   Acute rehab when medically stable.     Case discussed with Dr Cormier.

## 2020-11-10 LAB
GLUCOSE BLDC GLUCOMTR-MCNC: 148 MG/DL — HIGH (ref 70–99)
GLUCOSE BLDC GLUCOMTR-MCNC: 168 MG/DL — HIGH (ref 70–99)
GLUCOSE BLDC GLUCOMTR-MCNC: 191 MG/DL — HIGH (ref 70–99)
GLUCOSE BLDC GLUCOMTR-MCNC: 207 MG/DL — HIGH (ref 70–99)
GLUCOSE BLDC GLUCOMTR-MCNC: 249 MG/DL — HIGH (ref 70–99)

## 2020-11-10 PROCEDURE — 99232 SBSQ HOSP IP/OBS MODERATE 35: CPT

## 2020-11-10 PROCEDURE — 99233 SBSQ HOSP IP/OBS HIGH 50: CPT

## 2020-11-10 RX ORDER — GABAPENTIN 400 MG/1
100 CAPSULE ORAL THREE TIMES A DAY
Refills: 0 | Status: DISCONTINUED | OUTPATIENT
Start: 2020-11-10 | End: 2020-11-12

## 2020-11-10 RX ORDER — INSULIN LISPRO 100/ML
6 VIAL (ML) SUBCUTANEOUS
Refills: 0 | Status: DISCONTINUED | OUTPATIENT
Start: 2020-11-10 | End: 2020-11-12

## 2020-11-10 RX ORDER — INSULIN GLARGINE 100 [IU]/ML
12 INJECTION, SOLUTION SUBCUTANEOUS AT BEDTIME
Refills: 0 | Status: DISCONTINUED | OUTPATIENT
Start: 2020-11-10 | End: 2020-11-12

## 2020-11-10 RX ADMIN — Medication 6 UNIT(S): at 11:48

## 2020-11-10 RX ADMIN — ENOXAPARIN SODIUM 40 MILLIGRAM(S): 100 INJECTION SUBCUTANEOUS at 21:18

## 2020-11-10 RX ADMIN — Medication 81 MILLIGRAM(S): at 07:44

## 2020-11-10 RX ADMIN — Medication 2: at 11:48

## 2020-11-10 RX ADMIN — Medication 20 MILLIGRAM(S): at 07:44

## 2020-11-10 RX ADMIN — GABAPENTIN 100 MILLIGRAM(S): 400 CAPSULE ORAL at 13:27

## 2020-11-10 RX ADMIN — Medication 5 UNIT(S): at 08:35

## 2020-11-10 RX ADMIN — LISINOPRIL 5 MILLIGRAM(S): 2.5 TABLET ORAL at 06:30

## 2020-11-10 RX ADMIN — Medication 2: at 08:36

## 2020-11-10 RX ADMIN — PANTOPRAZOLE SODIUM 40 MILLIGRAM(S): 20 TABLET, DELAYED RELEASE ORAL at 06:30

## 2020-11-10 RX ADMIN — Medication 6 UNIT(S): at 17:41

## 2020-11-10 RX ADMIN — ATORVASTATIN CALCIUM 80 MILLIGRAM(S): 80 TABLET, FILM COATED ORAL at 21:18

## 2020-11-10 RX ADMIN — GABAPENTIN 100 MILLIGRAM(S): 400 CAPSULE ORAL at 21:18

## 2020-11-10 RX ADMIN — INSULIN GLARGINE 12 UNIT(S): 100 INJECTION, SOLUTION SUBCUTANEOUS at 21:18

## 2020-11-10 NOTE — OCCUPATIONAL THERAPY INITIAL EVALUATION ADULT - COORDINATION ASSESSED, REHAB EVAL
finger to nose/Right UE WFL; no dysmetria noted. Left UE not assessed 2* Left charity/lack of AROM Right UE WFL; no dysmetria noted. Left UE not assessed 2* Left charity/lack of AROM/finger to nose

## 2020-11-10 NOTE — PROGRESS NOTE ADULT - ASSESSMENT
64 yo F with Diabetes Mellitus type2 admitted for left sided weakness slurred speech , on arrivial BP is high   MR of brain positive for Right lenticular/ corona radiata stroke , BP is improving       1- Acute R CVA   with left sided hemiplegia  cont aspirin , statin   BP control, strict BG control  d/w neurology no further testing planned   ravi to discharge to acute rehab   family requesting [facility] rehab   CCC is aware     2- Hypertensive urgency, improving   cont lisinopril , controlled   avoid hypotension     3- Diabetes Mellitus type 2 , uncontrolled   cont ISS , added pre meals insulin and lantus   will hold oral diabetic medications   diet control    4* Hyperlipidemia   cont diet and statin high dose     d/w daughter   acute rehab   stable

## 2020-11-10 NOTE — OCCUPATIONAL THERAPY INITIAL EVALUATION ADULT - PLANNED THERAPY INTERVENTIONS, OT EVAL
ROM/strengthening/ADL retraining/bed mobility training/fine motor coordination training/balance training/neuromuscular re-education/transfer training/motor coordination training

## 2020-11-10 NOTE — OCCUPATIONAL THERAPY INITIAL EVALUATION ADULT - LIVES WITH, PROFILE
spouse/children/Pt reports lives in private house with multiple family members (, daughter x 2, son in law and 4 grand children). Pt states family is home at different times throughout the day and are available to assist upon discharge. Pt reports she is never alone. Pt states no steps to negotiate spouse/children/Pt reports lives in a private house with multiple family members (, daughter x 2, son in law and 4 grand children). Pt states family is home at different times throughout the day and are available to assist upon discharge. Pt reports she is never alone. Pt states no steps to negotiate

## 2020-11-10 NOTE — OCCUPATIONAL THERAPY INITIAL EVALUATION ADULT - LEVEL OF INDEPENDENCE: DRESS LOWER BODY, OT EVAL
dependent (less than 25% patients effort) dependent (less than 25% patients effort)/to don/doff socks

## 2020-11-10 NOTE — PROGRESS NOTE ADULT - SUBJECTIVE AND OBJECTIVE BOX
Patient doing well.  Unable to move the left LE as well as yesterday.  Attempted to translate via phone the movement.     REVIEW OF SYSTEMS  Constitutional - No fever,  No fatigue  Neurological - +loss of strength  Musculoskeletal - No joint pain, No joint swelling, No muscle pain    FUNCTIONAL PROGRESS  11/9  Bed Mobility: Scooting/Bridging:     · Level of [address]	maximum assist (25% patients effort)  · Physical Assist/Nonphysical Assist	2 person assist    Bed Mobility: Sit to Supine:     · Level of [address]	maximum assist (25% patients effort)  · Physical Assist/Nonphysical Assist	2 person assist    Bed Mobility: Supine to Sit:     · Level of [address]	maximum assist (25% patients effort)  · Physical Assist/Nonphysical Assist	2 person assist    Bed Mobility Analysis:     · Bed Mobility Limitations	decreased ability to use arms for pushing/pulling; decreased ability to use legs for bridging/pushing; impaired ability to control trunk for mobility  · Impairments Contributing to Impaired Bed Mobility	impaired balance; decreased strength; impaired motor control; abnormal muscle tone    Transfer: Bed to Chair:     Transfer Skill: Bed to Chair   · Level of [address]	unable to perform; safety concerns due to mobility status    Transfer: Chair to Bed:       · Level of [address]	unable to perform; safety concerns due to mobility status    Transfer: Sit to Stand:     · Level of [address]	maximum assist (25% patients effort)  · Physical Assist/Nonphysical Assist	2 person assist  · Assistive Device	manual assist x2 blocking left knee    Transfer: Stand to Sit:     · Level of [address]	maximum assist (25% patients effort)  · Physical Assist/Nonphysical Assist	2 person assist  · Assistive Device	manual assist x2 blocking left knee    Sit/Stand Transfer Safety Analysis:     · Transfer Safety Concerns Noted	decresed weight-shifting ability  · Impairments Contributing to Impaired Transfers	impaired balance; abnormal muscle tone; decreased strength; impaired postural control; impaired motor control    Gait Skills:     · Level of [address]	pt unable to weightshift to take a step        VITALS  T(C): 36.7 (11-10-20 @ 08:00), Max: 36.9 (11-09-20 @ 19:28)  HR: 74 (11-10-20 @ 08:00) (72 - 85)  BP: 158/73 (11-10-20 @ 08:00) (133/65 - 164/81)  RR: 17 (11-10-20 @ 08:00) (16 - 19)  SpO2: 99% (11-10-20 @ 08:00) (96% - 100%)  Wt(kg): --    MEDICATIONS   aspirin  chewable 81 milliGRAM(s) daily  atorvastatin 80 milliGRAM(s) at bedtime  dextrose 40% Gel 15 Gram(s) once PRN  dextrose 5%. 1000 milliLiter(s) <Continuous>  dextrose 50% Injectable 12.5 Gram(s) once  dextrose 50% Injectable 25 Gram(s) once  dextrose 50% Injectable 25 Gram(s) once  enoxaparin Injectable 40 milliGRAM(s) daily  FLUoxetine 20 milliGRAM(s) daily  gabapentin 100 milliGRAM(s) three times a day  glucagon  Injectable 1 milliGRAM(s) once PRN  insulin glargine Injectable (LANTUS) 12 Unit(s) at bedtime  insulin lispro (ADMELOG) corrective regimen sliding scale   three times a day before meals  insulin lispro Injectable (ADMELOG) 6 Unit(s) three times a day before meals  labetalol Injectable 10 milliGRAM(s) every 6 hours PRN  lisinopril 5 milliGRAM(s) daily  pantoprazole    Tablet 40 milliGRAM(s) before breakfast      RECENT LABS/IMAGING - Reviewed          CT HEAD: No acute intracranial bleeding.   CT PERFUSION: No regional perfusion abnormality.  CTA BRAIN: No flow-limiting stenosis or occlusion. Nondominant left vertebral artery terminates as PICA, anatomic variant..  CTA NECK: Moderate stenosis of the ICA origin due to calcified and noncalcified atheromatous plaque.    MRI HEAD 11/8 - Parenchymal volume loss and chronic microvascular ischemic changes are identified. Abnormal T2 prolongation restricted diffusion is seen involving the right lenticular nucleus/corona radiata region. This is compatible with an acute infarct. No hemorrhagic transformation is seen. No significant shift or herniation is seen. The large vessels demonstrate normal flow voids. The visualized paranasal sinuses mastoid eminence appear clear. IMPRESSION: Acute infarct as described above.    TTE 11/8 - Summary:   1. Left ventricular ejection fraction, by visual estimation, is 65 to 70%.   2. Normal global left ventricular systolic function.   3. Spectral Doppler shows impaired relaxation pattern.   4. Normal RV size and function, estimated PASP least 29 mmHg.   5. Trace mitral valve regurgitation.   6. Mild tricuspid regurgitation.   7. The ascending aorta is mildly dilated measuring 4.2 cm.   8. Limited color Doppler interrogation showed no obvious atrial septal defect.   9. There is no evidence of pericardial effusion.  10. Study cannot exclude the absence of a PFO, consider MARIAH if clinically indicated.  ----------------------------------------------------------------------------------------  PHYSICAL EXAM  Constitutional - NAD, Comfortable  Extremities - No C/C/E, No calf tenderness   Neurologic Exam -                    Cognitive - Awake, Alert, AAO to self, situation     Communication - Fluent, +dysarthria     Cranial Nerves - Left lip droop     Motor -                     LEFT    UE - ShAB 0/5, EF 0/5, EE 0/5, WE 0/5,  0/5                    RIGHT UE - ShAB 5/5, EF 5/5, EE 5/5, WE 5/5,  5/5                    LEFT    LE - HF 0/5, KE 0/5, DF 0/5, PF 0/5                    RIGHT LE - HF 5/5, KE 5/5, DF 5/5, PF 5/5        Sensory - Intact to LT  Psychiatric - Calm   ----------------------------------------------------------------------------------------  ASSESSMENT/PLAN  65yFemale with functional deficits after an acute CVA  Acute Right lenticular nucleus/corona radiata - ASA, Lipitor  Uncontrolled DM2 - Lantus, Lispro  Uncontrolled HTN - Lisinopril, Labetalol  Mood/Motor Recovery - Prozac 20mg (11/8)  Oral Dysphagia - Soft/Thins  Pain - Tylenol  DVT PPX - SCDs, Lovenox  Rehab - Continue to recommend ACUTE inpatient rehabilitation for the functional deficits consisting of 3 hours of therapy/day & 24 hour RN/daily PMR physician for comorbid medical management. Will continue to follow for ongoing rehab needs and recommendations. Patient will be able to tolerate 3 hours a day.    Continue bedside therapy as well as OOB throughout the day with mobilization throughout the day with staff to maintain cardiopulmonary function and prevention of secondary complications related to debility.     Discussed with rehab clinical team.

## 2020-11-10 NOTE — OCCUPATIONAL THERAPY INITIAL EVALUATION ADULT - DIAGNOSIS, OT EVAL
65y F with acute infarct in Right lentiform nucleus and corona radiata 65y F with Acute infarct in Right lentiform nucleus and corona radiata

## 2020-11-10 NOTE — OCCUPATIONAL THERAPY INITIAL EVALUATION ADULT - GENERAL OBSERVATIONS, REHAB EVAL
Patient is Pakistani Creole speaking, use of ipad  for OT evaluation. Received pt in NAD, +Bed alarm, +IV lock, +Primafit, +BP cuff, +bilateral VCB, +breathing RA. Patient agreeable to OT evaluation Patient is Syrian Creole speaking, use of ipad  for OT evaluation. Received pt in NAD, +Bed alarm, +IV lock, +Primafit, +BP cuff, +bilateral VCB, +breathing RA. Patient is Left hemiplegia + dysarthria noted. Patient agreeable to OT evaluation

## 2020-11-10 NOTE — OCCUPATIONAL THERAPY INITIAL EVALUATION ADULT - IMPAIRED TRANSFERS: SIT/STAND, REHAB EVAL
+Left charity/decreased flexibility/impaired motor control/pain/impaired postural control/impaired coordination/abnormal muscle tone/decreased strength/decreased ROM/impaired balance

## 2020-11-10 NOTE — OCCUPATIONAL THERAPY INITIAL EVALUATION ADULT - IMPAIRMENTS CONTRIBUTING IMPAIRED BED MOBILITY, REHAB EVAL
+Left charity/impaired balance/decreased ROM/impaired motor control/impaired postural control/impaired coordination/abnormal muscle tone/decreased strength

## 2020-11-10 NOTE — PROGRESS NOTE ADULT - SUBJECTIVE AND OBJECTIVE BOX
Patient is a 65y old  Female who presents with a chief complaint of left sided weakness, stroke   seen earlier  had pacific  Lynne Boateng online with nurse at the bedside   helps  with language   pt is stable with left sided weakness facial droop   TTE is pending     daughter is informed about current plan over the phone             REVIEW OF SYSTEMS:  left sided weakness , slurred speech and facial weakness   otherwise neg       Vital Signs Last 24 Hrs  T(C): 36.7 (10 Nov 2020 08:00), Max: 36.9 (09 Nov 2020 19:28)  T(F): 98 (10 Nov 2020 08:00), Max: 98.4 (09 Nov 2020 19:28)  HR: 74 (10 Nov 2020 08:00) (72 - 85)  BP: 158/73 (10 Nov 2020 08:00) (133/65 - 164/81)  BP(mean): 94 (10 Nov 2020 08:00) (94 - 94)  RR: 17 (10 Nov 2020 08:00) (16 - 19)  SpO2: 99% (10 Nov 2020 08:00) (96% - 100%)  PHYSICAL EXAM:    GENERAL: NAD, well-groomed  NERVOUS SYSTEM:  Alert & Oriented X3, ;eft sided plegia , facial droop , decreased sensation in LLE and LUE   unable to move left side  CHEST/LUNG: Clear to auscultation  bilaterally; No rales, rhonchi  HEART: Regular rate and rhythm; s1 /s2 No murmurs, rubs, or gallops  ABDOMEN: Soft, Nontender, Nondistended; Bowel sounds present  EXTREMITIES: No clubbing, cyanosis, or edema    CAPILLARY BLOOD GLUCOSE      POCT Blood Glucose.: 249 mg/dL (10 Nov 2020 08:28)  POCT Blood Glucose.: 243 mg/dL (09 Nov 2020 21:42)  POCT Blood Glucose.: 160 mg/dL (09 Nov 2020 16:24)  POCT Blood Glucose.: 197 mg/dL (09 Nov 2020 12:02)      RADIOLOGY & ADDITIONAL TESTS:  mr< from: MR Head No Cont (11.08.20 @ 17:14) >    Abnormal T2 prolongation restricted diffusion is seen involving the right lenticular nucleus/corona radiata region. This is compatible with an acute infarct. No hemorrhagic transformation is seen. No significant shift or herniation is seen.    < end of copied text >  < from: MR Head No Cont (11.08.20 @ 17:14) >    Abnormal T2 prolongation restricted diffusion is seen involving the right lenticular nucleus/corona radiata region. This is compatible with an acute infarct. No hemorrhagic transformation is seen. No significant shift or herniation is seen.    < end of copied text >

## 2020-11-10 NOTE — OCCUPATIONAL THERAPY INITIAL EVALUATION ADULT - ADDITIONAL COMMENTS
Pt states has a shower with doors and grab bars. Pt does not own any DME. Pt is right handed and does not drive.

## 2020-11-10 NOTE — OCCUPATIONAL THERAPY INITIAL EVALUATION ADULT - PHYSICAL ASSIST/NONPHYSICAL ASSIST: SIT/STAND, REHAB EVAL
verbal cues/2 person assist/Cues for sequencing of movement and for safety for proper hand placement prior to/during transfer

## 2020-11-10 NOTE — OCCUPATIONAL THERAPY INITIAL EVALUATION ADULT - PHYSICAL ASSIST/NONPHYSICAL ASSIST: GROOMING, OT EVAL
Pt is right hand dominant. Pt requires assist with opening containers and manipulating packaging/objects/verbal cues/1 person assist/set-up required

## 2020-11-10 NOTE — OCCUPATIONAL THERAPY INITIAL EVALUATION ADULT - SPECIAL TRAINING, OT EVAL
Patient unable to take a step at this time 2*decreased ability to weight-shift, decreased strength and impaired balance. Pt performed sit to stand transfer with mod A x 2, bilateral supports with therapist blocking Left knee/foot. Pt with fair static standing balance, poor dynamic standing balance.

## 2020-11-11 ENCOUNTER — TRANSCRIPTION ENCOUNTER (OUTPATIENT)
Age: 65
End: 2020-11-11

## 2020-11-11 LAB
GLUCOSE BLDC GLUCOMTR-MCNC: 143 MG/DL — HIGH (ref 70–99)
GLUCOSE BLDC GLUCOMTR-MCNC: 191 MG/DL — HIGH (ref 70–99)
GLUCOSE BLDC GLUCOMTR-MCNC: 212 MG/DL — HIGH (ref 70–99)
GLUCOSE BLDC GLUCOMTR-MCNC: 219 MG/DL — HIGH (ref 70–99)

## 2020-11-11 PROCEDURE — 99233 SBSQ HOSP IP/OBS HIGH 50: CPT

## 2020-11-11 PROCEDURE — 99232 SBSQ HOSP IP/OBS MODERATE 35: CPT

## 2020-11-11 RX ORDER — ENOXAPARIN SODIUM 100 MG/ML
40 INJECTION SUBCUTANEOUS
Qty: 0 | Refills: 0 | DISCHARGE
Start: 2020-11-11

## 2020-11-11 RX ORDER — POLYETHYLENE GLYCOL 3350 17 G/17G
17 POWDER, FOR SOLUTION ORAL
Refills: 0 | Status: DISCONTINUED | OUTPATIENT
Start: 2020-11-11 | End: 2020-11-12

## 2020-11-11 RX ORDER — INSULIN LISPRO 100/ML
5 VIAL (ML) SUBCUTANEOUS
Qty: 0 | Refills: 0 | DISCHARGE
Start: 2020-11-11

## 2020-11-11 RX ORDER — GABAPENTIN 400 MG/1
1 CAPSULE ORAL
Qty: 0 | Refills: 0 | DISCHARGE

## 2020-11-11 RX ORDER — LACTULOSE 10 G/15ML
30 SOLUTION ORAL
Qty: 0 | Refills: 0 | DISCHARGE
Start: 2020-11-11

## 2020-11-11 RX ORDER — POLYETHYLENE GLYCOL 3350 17 G/17G
17 POWDER, FOR SOLUTION ORAL
Qty: 0 | Refills: 0 | DISCHARGE
Start: 2020-11-11

## 2020-11-11 RX ORDER — SENNA PLUS 8.6 MG/1
2 TABLET ORAL
Qty: 0 | Refills: 0 | DISCHARGE
Start: 2020-11-11

## 2020-11-11 RX ORDER — INSULIN GLARGINE 100 [IU]/ML
10 INJECTION, SOLUTION SUBCUTANEOUS
Qty: 0 | Refills: 0 | DISCHARGE
Start: 2020-11-11

## 2020-11-11 RX ORDER — ATORVASTATIN CALCIUM 80 MG/1
1 TABLET, FILM COATED ORAL
Qty: 0 | Refills: 0 | DISCHARGE
Start: 2020-11-11

## 2020-11-11 RX ORDER — GABAPENTIN 400 MG/1
1 CAPSULE ORAL
Qty: 0 | Refills: 0 | DISCHARGE
Start: 2020-11-11

## 2020-11-11 RX ORDER — FLUOXETINE HCL 10 MG
1 CAPSULE ORAL
Qty: 0 | Refills: 0 | DISCHARGE
Start: 2020-11-11

## 2020-11-11 RX ORDER — METFORMIN HYDROCHLORIDE 850 MG/1
1 TABLET ORAL
Qty: 0 | Refills: 0 | DISCHARGE

## 2020-11-11 RX ORDER — PANTOPRAZOLE SODIUM 20 MG/1
1 TABLET, DELAYED RELEASE ORAL
Qty: 0 | Refills: 0 | DISCHARGE
Start: 2020-11-11

## 2020-11-11 RX ORDER — LISINOPRIL 2.5 MG/1
1 TABLET ORAL
Qty: 0 | Refills: 0 | DISCHARGE
Start: 2020-11-11

## 2020-11-11 RX ORDER — LACTULOSE 10 G/15ML
20 SOLUTION ORAL ONCE
Refills: 0 | Status: COMPLETED | OUTPATIENT
Start: 2020-11-11 | End: 2020-11-11

## 2020-11-11 RX ORDER — SENNA PLUS 8.6 MG/1
2 TABLET ORAL AT BEDTIME
Refills: 0 | Status: DISCONTINUED | OUTPATIENT
Start: 2020-11-11 | End: 2020-11-12

## 2020-11-11 RX ORDER — SITAGLIPTIN 50 MG/1
1 TABLET, FILM COATED ORAL
Qty: 0 | Refills: 0 | DISCHARGE

## 2020-11-11 RX ORDER — ASPIRIN/CALCIUM CARB/MAGNESIUM 324 MG
1 TABLET ORAL
Qty: 0 | Refills: 0 | DISCHARGE
Start: 2020-11-11

## 2020-11-11 RX ADMIN — ATORVASTATIN CALCIUM 80 MILLIGRAM(S): 80 TABLET, FILM COATED ORAL at 22:44

## 2020-11-11 RX ADMIN — ENOXAPARIN SODIUM 40 MILLIGRAM(S): 100 INJECTION SUBCUTANEOUS at 22:43

## 2020-11-11 RX ADMIN — Medication 20 MILLIGRAM(S): at 12:44

## 2020-11-11 RX ADMIN — Medication 81 MILLIGRAM(S): at 12:45

## 2020-11-11 RX ADMIN — SENNA PLUS 2 TABLET(S): 8.6 TABLET ORAL at 22:44

## 2020-11-11 RX ADMIN — Medication 6 UNIT(S): at 12:44

## 2020-11-11 RX ADMIN — POLYETHYLENE GLYCOL 3350 17 GRAM(S): 17 POWDER, FOR SOLUTION ORAL at 12:44

## 2020-11-11 RX ADMIN — Medication 1: at 08:10

## 2020-11-11 RX ADMIN — LACTULOSE 20 GRAM(S): 10 SOLUTION ORAL at 12:44

## 2020-11-11 RX ADMIN — INSULIN GLARGINE 12 UNIT(S): 100 INJECTION, SOLUTION SUBCUTANEOUS at 23:00

## 2020-11-11 RX ADMIN — GABAPENTIN 100 MILLIGRAM(S): 400 CAPSULE ORAL at 05:15

## 2020-11-11 RX ADMIN — Medication 6 UNIT(S): at 08:11

## 2020-11-11 RX ADMIN — POLYETHYLENE GLYCOL 3350 17 GRAM(S): 17 POWDER, FOR SOLUTION ORAL at 17:01

## 2020-11-11 RX ADMIN — GABAPENTIN 100 MILLIGRAM(S): 400 CAPSULE ORAL at 12:45

## 2020-11-11 RX ADMIN — GABAPENTIN 100 MILLIGRAM(S): 400 CAPSULE ORAL at 22:43

## 2020-11-11 RX ADMIN — Medication 6 UNIT(S): at 17:00

## 2020-11-11 RX ADMIN — Medication 2: at 12:44

## 2020-11-11 NOTE — PROGRESS NOTE ADULT - SUBJECTIVE AND OBJECTIVE BOX
Patient doing ok.  Unable to demonstrate movement of the left LE.  Does attempt to exercise it.  Mood is more flat.     REVIEW OF SYSTEMS  Constitutional - No fever,  +fatigue  Neurological - +loss of strength, No numbness, No tremors  Musculoskeletal - No joint pain, No joint swelling, No muscle pain  Psychiatric - +depression, No anxiety    FUNCTIONAL PROGRESS  11/10  Bed Mobility: Rolling/Turning:     · Level of [place]	moderate assist (50% patients effort)  · Physical Assist/Nonphysical Assist	1 person assist; verbal cues  · Assistive Device	bed rails    Bed Mobility: Scooting/Bridging:     · Level of [place]	maximum assist (25% patients effort); to scoot toward EOB  	  · Physical Assist/Nonphysical Assist	1 person assist; verbal cues    Bed Mobility: Sit to Supine:     · Level of [place]	maximum assist (25% patients effort)  · Physical Assist/Nonphysical Assist	verbal cues; 2 person assist  · Assistive Device	head of bed elevated    Bed Mobility: Supine to Sit:     · Level of [place]	maximum assist (25% patients effort)  · Physical Assist/Nonphysical Assist	1 person assist; verbal cues  · Assistive Device	bed rails; head of bed elevated    Bed Mobility Analysis:     · Bed Mobility Limitations	decreased ability to use legs for bridging/pushing; decreased ability to use arms for pushing/pulling  · Impairments Contributing to Impaired Bed Mobility	impaired balance; impaired coordination; impaired motor control; impaired postural control; decreased ROM; decreased strength; +Left charity; abnormal muscle tone    Transfer: Sit to Stand:     · Level of [place]	moderate assist (50% patients effort)  · Physical Assist/Nonphysical Assist	2 person assist; verbal cues; Cues for sequencing of movement and for safety for proper hand placement prior to/during transfer  · Weight-Bearing Restrictions	full weight-bearing  · Assistive Device	bilateral assist +blocking Left knee/foot    Transfer: Stand to Sit:     · Level of [place]	moderate assist (50% patients effort)  · Physical Assist/Nonphysical Assist	2 person assist; verbal cues  · Weight-Bearing Restrictions	full weight-bearing  · Assistive Device	bilateral assist +blocking Left knee/foot    Sit/Stand Transfer Safety Analysis:     · Transfer Safety Concerns Noted	decreased weight-shifting ability; decreased sequencing ability  · Impairments Contributing to Impaired Transfers	impaired balance; impaired coordination; decreased flexibility; impaired motor control; abnormal muscle tone; pain; impaired postural control; decreased ROM; decreased strength; +Left charity    Bathing Training:     · Level of [place]	maximum assist (25% patients effort); to sponge bathe  · Physical Assist/Nonphysical Assist	1 person assist; verbal cues; set-up required    Upper Body Dressing Training:     · Level of [place]	moderate assist (50% patients effort); seated to don/doff gown  · Physical Assist/Nonphysical Assist	1 person assist; verbal cues; set-up required; Pt educated in charity dressing techniques    Lower Body Dressing Training:     · Level of [place]	dependent (less than 25% patients effort); to don/doff socks  	    Toilet Hygiene Training:     · Level of [place]	dependent (less than 25% patients effort); +Primafit    Grooming Training:     · Level of [place]	minimum assist (75% patients effort); seated  · Physical Assist/Nonphysical Assist	1 person assist; verbal cues; set-up required; Pt is right hand dominant. Pt requires assist with opening containers and manipulating packaging/objects    Eating/Self-Feeding Training:     · Level of [place]	Did not directly observe      VITALS  T(C): 37.1 (11-11-20 @ 08:29), Max: 37.3 (11-10-20 @ 22:35)  HR: 78 (11-11-20 @ 08:29) (75 - 86)  BP: 146/71 (11-11-20 @ 08:29) (107/63 - 164/76)  RR: 18 (11-11-20 @ 08:29) (17 - 18)  SpO2: 93% (11-11-20 @ 08:29) (93% - 100%)  Wt(kg): --    MEDICATIONS   aspirin  chewable 81 milliGRAM(s) daily  atorvastatin 80 milliGRAM(s) at bedtime  dextrose 40% Gel 15 Gram(s) once PRN  dextrose 5%. 1000 milliLiter(s) <Continuous>  dextrose 50% Injectable 12.5 Gram(s) once  dextrose 50% Injectable 25 Gram(s) once  dextrose 50% Injectable 25 Gram(s) once  enoxaparin Injectable 40 milliGRAM(s) daily  FLUoxetine 20 milliGRAM(s) daily  gabapentin 100 milliGRAM(s) three times a day  glucagon  Injectable 1 milliGRAM(s) once PRN  insulin glargine Injectable (LANTUS) 12 Unit(s) at bedtime  insulin lispro (ADMELOG) corrective regimen sliding scale   three times a day before meals  insulin lispro Injectable (ADMELOG) 6 Unit(s) three times a day before meals  labetalol Injectable 10 milliGRAM(s) every 6 hours PRN  lisinopril 5 milliGRAM(s) daily  pantoprazole    Tablet 40 milliGRAM(s) before breakfast      RECENT LABS/IMAGING - Reviewed                      CT HEAD: No acute intracranial bleeding.   CT PERFUSION: No regional perfusion abnormality.  CTA BRAIN: No flow-limiting stenosis or occlusion. Nondominant left vertebral artery terminates as PICA, anatomic variant..  CTA NECK: Moderate stenosis of the ICA origin due to calcified and noncalcified atheromatous plaque.    MRI HEAD 11/8 - Parenchymal volume loss and chronic microvascular ischemic changes are identified. Abnormal T2 prolongation restricted diffusion is seen involving the right lenticular nucleus/corona radiata region. This is compatible with an acute infarct. No hemorrhagic transformation is seen. No significant shift or herniation is seen. The large vessels demonstrate normal flow voids. The visualized paranasal sinuses mastoid eminence appear clear. IMPRESSION: Acute infarct as described above.    TTE 11/8 - Summary:   1. Left ventricular ejection fraction, by visual estimation, is 65 to 70%.   2. Normal global left ventricular systolic function.   3. Spectral Doppler shows impaired relaxation pattern.   4. Normal RV size and function, estimated PASP least 29 mmHg.   5. Trace mitral valve regurgitation.   6. Mild tricuspid regurgitation.   7. The ascending aorta is mildly dilated measuring 4.2 cm.   8. Limited color Doppler interrogation showed no obvious atrial septal defect.   9. There is no evidence of pericardial effusion.  10. Study cannot exclude the absence of a PFO, consider MARIAH if clinically indicated.  ----------------------------------------------------------------------------------------  PHYSICAL EXAM  Constitutional - NAD, Comfortable  Extremities - No C/C/E, No calf tenderness   Neurologic Exam -                    Cognitive - Awake, Alert, AAO to self, situation     Communication - Fluent, +dysarthria     Cranial Nerves - Left lip droop     Motor -                     LEFT    UE - ShAB 0/5, EF 0/5, EE 0/5, WE 0/5,  0/5                    RIGHT UE - ShAB 5/5, EF 5/5, EE 5/5, WE 5/5,  5/5                    LEFT    LE - HF 0/5, KE 0/5, DF 0/5, PF 0/5                    RIGHT LE - HF 5/5, KE 5/5, DF 5/5, PF 5/5        Sensory - Intact to LT  Psychiatric - Flat, Depressed  ----------------------------------------------------------------------------------------  ASSESSMENT/PLAN  65yFemale with functional deficits after an acute CVA  Acute Right lenticular nucleus/corona radiata - ASA, Lipitor  Uncontrolled DM2 - Lantus, Lispro  Uncontrolled HTN - Lisinopril, Recommend change Labetalol to PO  Mood/Motor Recovery - Prozac 20mg (11/8)  Oral Dysphagia - Soft/Thins  Pain - Tylenol, Neurontin   DVT PPX - SCDs, Lovenox  Rehab - Continue to recommend ACUTE inpatient rehabilitation for the functional deficits consisting of 3 hours of therapy/day & 24 hour RN/daily PMR physician for comorbid medical management. Will continue to follow for ongoing rehab needs and recommendations. Patient will be able to tolerate 3 hours a day.    Continue bedside therapy as well as OOB throughout the day with mobilization throughout the day with staff to maintain cardiopulmonary function and prevention of secondary complications related to debility.     Discussed with rehab clinical team.

## 2020-11-11 NOTE — DISCHARGE NOTE PROVIDER - NSDCMRMEDTOKEN_GEN_ALL_CORE_FT
aspirin 81 mg oral delayed release tablet: 1 tab(s) orally once a day  atorvastatin 80 mg oral tablet: 1 tab(s) orally once a day (at bedtime)  enoxaparin: 40 milligram(s) subcutaneous once a day  FLUoxetine 20 mg oral capsule: 1 cap(s) orally once a day  gabapentin 100 mg oral capsule: 1 cap(s) orally 3 times a day  glipiZIDE 5 mg oral tablet: 1 tab(s) orally once a day  insulin glargine: 10 unit(s) subcutaneous once a day (at bedtime)  insulin lispro 100 units/mL injectable solution: 5 unit(s) injectable 3 times a day (before meals)  lactulose 10 g/15 mL oral syrup: 30 milliliter(s) orally once  lisinopril 5 mg oral tablet: 1 tab(s) orally once a day  pantoprazole 40 mg oral delayed release tablet: 1 tab(s) orally once a day (before a meal)  polyethylene glycol 3350 oral powder for reconstitution: 17 gram(s) orally 2 times a day  senna oral tablet: 2 tab(s) orally once a day (at bedtime)

## 2020-11-11 NOTE — DISCHARGE NOTE PROVIDER - HOSPITAL COURSE
64 yo F with Diabetes Mellitus type2 , no h/o HTN presented to the ED with c/o left sided weakness slurred speech , on arrivial BP is high , she was evaluated by IR and meurology on arrivial not candidate for alteplase , admitted for stroke , BP control , MR  of brain positive for Right lenticular/ corona radiata stroke , BP meds initiated and better controlled    she is with functional deficits and left sided weakness , seen by acute rehab and discharged in stable condition     total time spend 35 min coordinating care

## 2020-11-11 NOTE — DISCHARGE NOTE PROVIDER - NSDCPNSUBOBJ_GEN_ALL_CORE
Pt is seen examined .had [place]  Jose , ID # [id number]  pt is c/o shoulder pain on the left and constipation , no BM since Monday   denies abd pain , no nausea     Vital Signs Last 24 Hrs  T(C): 37.1 (11 Nov 2020 08:29), Max: 37.3 (10 Nov 2020 22:35)  T(F): 98.7 (11 Nov 2020 08:29), Max: 99.2 (10 Nov 2020 22:35)  HR: 78 (11 Nov 2020 08:29) (75 - 86)  BP: 146/71 (11 Nov 2020 08:29) (107/63 - 164/76)  BP(mean): --  RR: 18 (11 Nov 2020 08:29) (17 - 18)  SpO2: 93% (11 Nov 2020 08:29) (93% - 100%)    Abd : soft no tenderness , BS positive   Ext : no pretibial edema   Neuro : awake alert oriented, left facial weakness , left hemiplegia     constipation   add miralax , senna , lactulose ordered

## 2020-11-11 NOTE — DISCHARGE NOTE PROVIDER - NSDCCPCAREPLAN_GEN_ALL_CORE_FT
PRINCIPAL DISCHARGE DIAGNOSIS  Diagnosis: Acute cerebrovascular accident (CVA)  Assessment and Plan of Treatment: take apsirin , atorvastatin   blood pressure control      SECONDARY DISCHARGE DIAGNOSES  Diagnosis: Hyperlipidemia LDL goal <70  Assessment and Plan of Treatment: cont low fat diet    Diagnosis: Essential hypertension  Assessment and Plan of Treatment: cont to take medications , adjsut medical therapy for BP control as needed by your doctors    Diagnosis: Diabetes mellitus  Assessment and Plan of Treatment: blood sugar monitoring , check your BG and use insulin as needed

## 2020-11-12 ENCOUNTER — INPATIENT (INPATIENT)
Facility: HOSPITAL | Age: 65
LOS: 12 days | Discharge: HOME | DRG: 949 | End: 2020-11-25
Attending: STUDENT IN AN ORGANIZED HEALTH CARE EDUCATION/TRAINING PROGRAM | Admitting: SPECIALIST
Payer: MEDICARE

## 2020-11-12 ENCOUNTER — TRANSCRIPTION ENCOUNTER (OUTPATIENT)
Age: 65
End: 2020-11-12

## 2020-11-12 VITALS
WEIGHT: 175.27 LBS | TEMPERATURE: 98 F | SYSTOLIC BLOOD PRESSURE: 157 MMHG | DIASTOLIC BLOOD PRESSURE: 74 MMHG | OXYGEN SATURATION: 95 % | RESPIRATION RATE: 16 BRPM | HEIGHT: 69 IN | HEART RATE: 92 BPM

## 2020-11-12 VITALS
DIASTOLIC BLOOD PRESSURE: 77 MMHG | SYSTOLIC BLOOD PRESSURE: 124 MMHG | RESPIRATION RATE: 18 BRPM | HEART RATE: 87 BPM | TEMPERATURE: 99 F

## 2020-11-12 DIAGNOSIS — I63.9 CEREBRAL INFARCTION, UNSPECIFIED: ICD-10-CM

## 2020-11-12 PROBLEM — K21.9 GASTRO-ESOPHAGEAL REFLUX DISEASE WITHOUT ESOPHAGITIS: Chronic | Status: ACTIVE | Noted: 2020-11-08

## 2020-11-12 LAB
GLUCOSE BLDC GLUCOMTR-MCNC: 142 MG/DL — HIGH (ref 70–99)
GLUCOSE BLDC GLUCOMTR-MCNC: 208 MG/DL — HIGH (ref 70–99)
GLUCOSE BLDC GLUCOMTR-MCNC: 223 MG/DL — HIGH (ref 70–99)
GLUCOSE BLDC GLUCOMTR-MCNC: 283 MG/DL — HIGH (ref 70–99)
SARS-COV-2 RNA SPEC QL NAA+PROBE: SIGNIFICANT CHANGE UP

## 2020-11-12 PROCEDURE — 86850 RBC ANTIBODY SCREEN: CPT

## 2020-11-12 PROCEDURE — 80053 COMPREHEN METABOLIC PANEL: CPT

## 2020-11-12 PROCEDURE — 92610 EVALUATE SWALLOWING FUNCTION: CPT

## 2020-11-12 PROCEDURE — U0003: CPT

## 2020-11-12 PROCEDURE — 93880 EXTRACRANIAL BILAT STUDY: CPT

## 2020-11-12 PROCEDURE — 99291 CRITICAL CARE FIRST HOUR: CPT

## 2020-11-12 PROCEDURE — 81001 URINALYSIS AUTO W/SCOPE: CPT

## 2020-11-12 PROCEDURE — 85025 COMPLETE CBC W/AUTO DIFF WBC: CPT

## 2020-11-12 PROCEDURE — 84443 ASSAY THYROID STIM HORMONE: CPT

## 2020-11-12 PROCEDURE — 85730 THROMBOPLASTIN TIME PARTIAL: CPT

## 2020-11-12 PROCEDURE — 86901 BLOOD TYPING SEROLOGIC RH(D): CPT

## 2020-11-12 PROCEDURE — 80061 LIPID PANEL: CPT

## 2020-11-12 PROCEDURE — 70450 CT HEAD/BRAIN W/O DYE: CPT

## 2020-11-12 PROCEDURE — 0042T: CPT

## 2020-11-12 PROCEDURE — 83036 HEMOGLOBIN GLYCOSYLATED A1C: CPT

## 2020-11-12 PROCEDURE — 86900 BLOOD TYPING SEROLOGIC ABO: CPT

## 2020-11-12 PROCEDURE — 99239 HOSP IP/OBS DSCHRG MGMT >30: CPT

## 2020-11-12 PROCEDURE — 36415 COLL VENOUS BLD VENIPUNCTURE: CPT

## 2020-11-12 PROCEDURE — 84484 ASSAY OF TROPONIN QUANT: CPT

## 2020-11-12 PROCEDURE — 97163 PT EVAL HIGH COMPLEX 45 MIN: CPT

## 2020-11-12 PROCEDURE — 93005 ELECTROCARDIOGRAM TRACING: CPT

## 2020-11-12 PROCEDURE — 82962 GLUCOSE BLOOD TEST: CPT

## 2020-11-12 PROCEDURE — 86769 SARS-COV-2 COVID-19 ANTIBODY: CPT

## 2020-11-12 PROCEDURE — 86803 HEPATITIS C AB TEST: CPT

## 2020-11-12 PROCEDURE — 97167 OT EVAL HIGH COMPLEX 60 MIN: CPT

## 2020-11-12 PROCEDURE — 85610 PROTHROMBIN TIME: CPT

## 2020-11-12 PROCEDURE — 71045 X-RAY EXAM CHEST 1 VIEW: CPT

## 2020-11-12 PROCEDURE — 70551 MRI BRAIN STEM W/O DYE: CPT

## 2020-11-12 PROCEDURE — 99233 SBSQ HOSP IP/OBS HIGH 50: CPT

## 2020-11-12 PROCEDURE — 93306 TTE W/DOPPLER COMPLETE: CPT

## 2020-11-12 PROCEDURE — 70498 CT ANGIOGRAPHY NECK: CPT

## 2020-11-12 PROCEDURE — 70496 CT ANGIOGRAPHY HEAD: CPT

## 2020-11-12 RX ORDER — DEXTROSE 50 % IN WATER 50 %
15 SYRINGE (ML) INTRAVENOUS ONCE
Refills: 0 | Status: DISCONTINUED | OUTPATIENT
Start: 2020-11-12 | End: 2020-11-25

## 2020-11-12 RX ORDER — SODIUM CHLORIDE 9 MG/ML
1000 INJECTION, SOLUTION INTRAVENOUS
Refills: 0 | Status: DISCONTINUED | OUTPATIENT
Start: 2020-11-12 | End: 2020-11-25

## 2020-11-12 RX ORDER — ENOXAPARIN SODIUM 100 MG/ML
40 INJECTION SUBCUTANEOUS DAILY
Refills: 0 | Status: DISCONTINUED | OUTPATIENT
Start: 2020-11-12 | End: 2020-11-25

## 2020-11-12 RX ORDER — PANTOPRAZOLE SODIUM 20 MG/1
40 TABLET, DELAYED RELEASE ORAL
Refills: 0 | Status: DISCONTINUED | OUTPATIENT
Start: 2020-11-12 | End: 2020-11-25

## 2020-11-12 RX ORDER — POLYETHYLENE GLYCOL 3350 17 G/17G
17 POWDER, FOR SOLUTION ORAL
Refills: 0 | Status: DISCONTINUED | OUTPATIENT
Start: 2020-11-12 | End: 2020-11-25

## 2020-11-12 RX ORDER — ASPIRIN/CALCIUM CARB/MAGNESIUM 324 MG
81 TABLET ORAL DAILY
Refills: 0 | Status: DISCONTINUED | OUTPATIENT
Start: 2020-11-12 | End: 2020-11-25

## 2020-11-12 RX ORDER — DEXTROSE 50 % IN WATER 50 %
25 SYRINGE (ML) INTRAVENOUS ONCE
Refills: 0 | Status: DISCONTINUED | OUTPATIENT
Start: 2020-11-12 | End: 2020-11-25

## 2020-11-12 RX ORDER — DEXTROSE 50 % IN WATER 50 %
12.5 SYRINGE (ML) INTRAVENOUS ONCE
Refills: 0 | Status: DISCONTINUED | OUTPATIENT
Start: 2020-11-12 | End: 2020-11-25

## 2020-11-12 RX ORDER — LISINOPRIL 2.5 MG/1
5 TABLET ORAL DAILY
Refills: 0 | Status: DISCONTINUED | OUTPATIENT
Start: 2020-11-12 | End: 2020-11-25

## 2020-11-12 RX ORDER — GLUCAGON INJECTION, SOLUTION 0.5 MG/.1ML
1 INJECTION, SOLUTION SUBCUTANEOUS ONCE
Refills: 0 | Status: DISCONTINUED | OUTPATIENT
Start: 2020-11-12 | End: 2020-11-25

## 2020-11-12 RX ORDER — SENNA PLUS 8.6 MG/1
2 TABLET ORAL AT BEDTIME
Refills: 0 | Status: DISCONTINUED | OUTPATIENT
Start: 2020-11-12 | End: 2020-11-25

## 2020-11-12 RX ORDER — GABAPENTIN 400 MG/1
100 CAPSULE ORAL THREE TIMES A DAY
Refills: 0 | Status: DISCONTINUED | OUTPATIENT
Start: 2020-11-12 | End: 2020-11-25

## 2020-11-12 RX ORDER — INSULIN LISPRO 100/ML
VIAL (ML) SUBCUTANEOUS AT BEDTIME
Refills: 0 | Status: DISCONTINUED | OUTPATIENT
Start: 2020-11-12 | End: 2020-11-25

## 2020-11-12 RX ORDER — FLUOXETINE HCL 10 MG
20 CAPSULE ORAL DAILY
Refills: 0 | Status: DISCONTINUED | OUTPATIENT
Start: 2020-11-12 | End: 2020-11-25

## 2020-11-12 RX ORDER — INSULIN GLARGINE 100 [IU]/ML
12 INJECTION, SOLUTION SUBCUTANEOUS AT BEDTIME
Refills: 0 | Status: DISCONTINUED | OUTPATIENT
Start: 2020-11-12 | End: 2020-11-13

## 2020-11-12 RX ORDER — ATORVASTATIN CALCIUM 80 MG/1
80 TABLET, FILM COATED ORAL AT BEDTIME
Refills: 0 | Status: DISCONTINUED | OUTPATIENT
Start: 2020-11-12 | End: 2020-11-25

## 2020-11-12 RX ORDER — INSULIN LISPRO 100/ML
6 VIAL (ML) SUBCUTANEOUS
Refills: 0 | Status: DISCONTINUED | OUTPATIENT
Start: 2020-11-12 | End: 2020-11-22

## 2020-11-12 RX ORDER — INSULIN LISPRO 100/ML
VIAL (ML) SUBCUTANEOUS
Refills: 0 | Status: DISCONTINUED | OUTPATIENT
Start: 2020-11-12 | End: 2020-11-25

## 2020-11-12 RX ADMIN — GABAPENTIN 100 MILLIGRAM(S): 400 CAPSULE ORAL at 05:54

## 2020-11-12 RX ADMIN — GABAPENTIN 100 MILLIGRAM(S): 400 CAPSULE ORAL at 11:50

## 2020-11-12 RX ADMIN — Medication 2: at 07:42

## 2020-11-12 RX ADMIN — Medication 6 UNIT(S): at 07:42

## 2020-11-12 RX ADMIN — LISINOPRIL 5 MILLIGRAM(S): 2.5 TABLET ORAL at 05:54

## 2020-11-12 RX ADMIN — Medication 20 MILLIGRAM(S): at 07:42

## 2020-11-12 RX ADMIN — PANTOPRAZOLE SODIUM 40 MILLIGRAM(S): 20 TABLET, DELAYED RELEASE ORAL at 05:54

## 2020-11-12 RX ADMIN — SENNA PLUS 2 TABLET(S): 8.6 TABLET ORAL at 21:44

## 2020-11-12 RX ADMIN — POLYETHYLENE GLYCOL 3350 17 GRAM(S): 17 POWDER, FOR SOLUTION ORAL at 11:50

## 2020-11-12 RX ADMIN — ENOXAPARIN SODIUM 40 MILLIGRAM(S): 100 INJECTION SUBCUTANEOUS at 21:44

## 2020-11-12 RX ADMIN — Medication 6 UNIT(S): at 11:51

## 2020-11-12 RX ADMIN — GABAPENTIN 100 MILLIGRAM(S): 400 CAPSULE ORAL at 21:44

## 2020-11-12 RX ADMIN — INSULIN GLARGINE 12 UNIT(S): 100 INJECTION, SOLUTION SUBCUTANEOUS at 21:49

## 2020-11-12 RX ADMIN — POLYETHYLENE GLYCOL 3350 17 GRAM(S): 17 POWDER, FOR SOLUTION ORAL at 05:54

## 2020-11-12 RX ADMIN — Medication 81 MILLIGRAM(S): at 07:42

## 2020-11-12 RX ADMIN — ATORVASTATIN CALCIUM 80 MILLIGRAM(S): 80 TABLET, FILM COATED ORAL at 21:44

## 2020-11-12 NOTE — H&P ADULT - NSHPPHYSICALEXAM_GEN_ALL_CORE
Constitutional - NAD, Comfortably lying in bed  HEENT - NCAT, EOMI  Neck - Supple, No limited ROM  Chest - Breathing comfortably, No wheezing, clear to auscultation bilaterally  Cardiovascular - S1S2, RRR  Abdomen - Soft, nontender, nondistended, normoactive bowel sounds  Extremities - No C/C/E, No calf tenderness   Neurologic Exam -                    Cognitive - Awake, Alert, AAO to self, place, date, year, situation     Communication - Fluent, +dysarthria     Cranial Nerves - +left facial droop, PERRL, EOMI, no visual fields intact     Motor -                     LEFT    UE - ShAB 1/5, EF 0/5, EE 0/5, WE 0/5,  0/5                    RIGHT UE - ShAB 5/5, EF 5/5, EE 5/5, WE 5/5,  5/5                    LEFT    LE - HF 1/5, Hip Adduction 1/5, KE 0/5, DF 0/5, PF 0/5                    RIGHT LE - HF 5/5, KE 5/5, DF 5/5, PF 5/5        Sensory - Intact to LT bilaterally     Coordination - FTN intact with right hand  Psychiatric - Mood stable, Affect WNL

## 2020-11-12 NOTE — PROGRESS NOTE ADULT - SUBJECTIVE AND OBJECTIVE BOX
Patient is a 65y old  Female who presents with a chief complaint of left sided weakness, stroke   seen in am , she is feeling well , + BM   no new complaints   left hemiplagia facial weakness same       Vital Signs Last 24 Hrs  T(C): 36.9 (12 Nov 2020 08:08), Max: 36.9 (12 Nov 2020 08:08)  T(F): 98.4 (12 Nov 2020 08:08), Max: 98.4 (12 Nov 2020 08:08)  HR: 67 (12 Nov 2020 08:08) (67 - 88)  BP: 130/72 (12 Nov 2020 08:08) (123/74 - 145/80)  BP(mean): --  RR: 19 (12 Nov 2020 08:08) (18 - 19)  SpO2: 97% (12 Nov 2020 05:04) (94% - 97%)    GENERAL: NAD, well-groomed  NERVOUS SYSTEM:  Alert & Oriented X3, ;eft sided plegia , facial droop , decreased sensation in LLE and LUE   unable to move left side  CHEST/LUNG: Clear to auscultation  bilaterally; No rales, rhonchi  HEART: Regular rate and rhythm; s1 /s2 No murmurs, rubs, or gallops  ABDOMEN: Soft, Nontender, Nondistended; Bowel sounds present  EXTREMITIES: No clubbing, cyanosis, or edema      RADIOLOGY & ADDITIONAL TESTS:  mr< from: MR Head No Cont (11.08.20 @ 17:14) >    Abnormal T2 prolongation restricted diffusion is seen involving the right lenticular nucleus/corona radiata region. This is compatible with an acute infarct. No hemorrhagic transformation is seen. No significant shift or herniation is seen.    < end of copied text >  < from: MR Head No Cont (11.08.20 @ 17:14) >    Abnormal T2 prolongation restricted diffusion is seen involving the right lenticular nucleus/corona radiata region. This is compatible with an acute infarct. No hemorrhagic transformation is seen. No significant shift or herniation is seen.    < end of copied text >

## 2020-11-12 NOTE — H&P ADULT - NSHPSOCIALHISTORY_GEN_ALL_CORE
SOCIAL HISTORY  - Smoking: denied  - Alcohol: denied  - Drug Use: denied    FUNCTIONAL HISTORY  Pt lives with her daughter and her family in a 1 story house with 2 steps to enter without a rail. Prior to admission, pt was fully independent with all ADLs and ambulation. Daughter and family are around for assistance.    CURRENT FUNCTIONAL STATUS  11/9  Bed Mobility: Sit to Supine:   · Level of [place]	maximum assist (25% patients effort)  · Physical Assist/Nonphysical Assist	2 person assist  Bed Mobility: Supine to Sit:   · Level of [place]	maximum assist (25% patients effort)  · Physical Assist/Nonphysical Assist	2 person assist  Bed Mobility Analysis:   · Bed Mobility Limitations	decreased ability to use arms for pushing/pulling; decreased ability to use legs for bridging/pushing; impaired ability to control trunk for mobility  · Impairments Contributing to Impaired Bed Mobility	impaired balance; decreased strength; impaired motor control; abnormal muscle tone  Transfer Skill: Bed to Chair   · Level of [place]	unable to perform; safety concerns due to mobility status  Transfer: Chair to Bed:   · Level of [place]	unable to perform; safety concerns due to mobility status    Transfer: Sit to Stand:     · Level of [place]	maximum assist (25% patients effort)  · Physical Assist/Nonphysical Assist	2 person assist  · Assistive Device	manual assist x2 blocking left knee    Transfer: Stand to Sit:     · Level of [place]	maximum assist (25% patients effort)  · Physical Assist/Nonphysical Assist	2 person assist  · Assistive Device	manual assist x2 blocking left knee

## 2020-11-12 NOTE — H&P ADULT - NSHPREVIEWOFSYSTEMS_GEN_ALL_CORE
REVIEW OF SYSTEMS:  Pt is Creole speaking - Nurse Emerson was able to translate    CONSTITUTIONAL: No fevers or chills  EYES/ENT: No visual changes;  No vertigo or throat pain   NECK: No pain or stiffness  RESPIRATORY: No cough, wheezing, or shortness of breath  CARDIOVASCULAR: No chest pain or palpitations  GASTROINTESTINAL: No abdominal or epigastric pain. No nausea or vomiting. No diarrhea or constipation.   GENITOURINARY: No dysuria, frequency or hematuria  NEUROLOGICAL: No numbness, +weakness  SKIN: No itching, rashes

## 2020-11-12 NOTE — DISCHARGE NOTE NURSING/CASE MANAGEMENT/SOCIAL WORK - PATIENT PORTAL LINK FT
You can access the FollowMyHealth Patient Portal offered by [facility] by registering at the following website: [email or web address]. By joining [employer]’s FollowMyHealth portal, you will also be able to view your health information using other applications (apps) compatible with our system.

## 2020-11-12 NOTE — PROGRESS NOTE ADULT - ASSESSMENT
66 yo F with Diabetes Mellitus type2 admitted for left sided weakness slurred speech , on arrivial BP is high   MR of brain positive for Right lenticular/ corona radiata stroke , BP is improving       1- Acute R CVA   with left sided hemiplegia  cont aspirin , statin   d/w neurology no further testing planned   discharge to acute rehab     2- Hypertensive urgency, controlled      cont lisinopril   adjust medication as needed     3- Diabetes Mellitus type 2 , uncontrolled   cont ISS , added pre meals insulin and lantus   will hold oral diabetic medications   diet     4* Hyperlipidemia   cont diet and statin     stable for discharge

## 2020-11-12 NOTE — H&P ADULT - NSHPLABSRESULTS_GEN_ALL_CORE
.  LABS:                         RADIOLOGY, EKG & ADDITIONAL TESTS: Reviewed.    MRI Head 11/8: Abnormal T2 prolongation restricted diffusion is seen involving the right lenticular nucleus/corona radiata region. This is compatible with an acute infarct. No hemorrhagic transformation is seen. No significant shift or herniation is seen. .  LABS:     < from: US Duplex Carotid Arteries Complete, Bilateral (11.08.20 @ 11:28) >    IMPRESSION:    Extensive calcified and noncalcified plaque in the proximal right internal carotid artery with an elevated peak systolic velocity suggestive of a greater than 70% stenosis, however an diastolic velocity and internal carotid artery to common carotid artery peak systolic velocity ratio is suggestive of a 50-69% stenosis; see CTAneck earlier today.    Mild to moderate plaque in the proximal left internal carotid artery with a borderline elevated peak systolic velocity suggestive of a greater than 50% stenosis; also see CTA.    Measurement of carotid stenosis is based on velocity parameters that correlate the residual internal carotid diameter with that of the more distal vessel in accordance with a method such as the [employer] American Symptomatic Carotid Endarterectomy Trial (NASCET).      < end of copied text >    < from: CT Angio Neck w/ IV Cont (11.08.20 @ 00:22) >    IMPRESSION:    CT HEAD: No acute intracranial bleeding.    CT PERFUSION: No regional perfusion abnormality.    CTA BRAIN: No flow-limiting stenosis or occlusion.    Nondominant left vertebral artery terminates as PICA, anatomic variant..    CTA NECK: Moderate stenosis of the ICA origin due to calcified and noncalcified atheromatous plaque.    Discussed with Dr. Mclaughlin at 12:37 AM on 11/8/2020 who indicated that the communication was understood.              SHARON ACEVEDO MD; Attending Radiologist  This document has been electronically signed. Nov 8 2020  1:58AM    < end of copied text >        < from: TTE Echo Complete w/o Contrast w/ Doppler (11.08.20 @ 10:48) >      Summary:   1. Left ventricular ejection fraction, by visual estimation, is 65 to 70%.   2. Normal global left ventricular systolic function.   3. Spectral Doppler shows impaired relaxation pattern.   4. Normal RV size and function, estimated PASP least 29 mmHg.   5. Trace mitral valve regurgitation.   6. Mild tricuspid regurgitation.   7. The ascending aorta is mildly dilated measuring 4.2 cm.   8. Limited color Doppler interrogation showed no obvious atrial septal defect.   9. There is no evidence of pericardial effusion.  10. Study cannot exclude the absence of a PFO, consider MARIAH if clinically indicated.    < end of copied text >                                RADIOLOGY, EKG & ADDITIONAL TESTS: Reviewed.    MRI Head 11/8: Abnormal T2 prolongation restricted diffusion is seen involving the right lenticular nucleus/corona radiata region. This is compatible with an acute infarct. No hemorrhagic transformation is seen. No significant shift or herniation is seen.

## 2020-11-12 NOTE — DISCHARGE NOTE NURSING/CASE MANAGEMENT/SOCIAL WORK - NSDCPEPTSTRK_GEN_ALL_CORE
Stroke warning signs and symptoms/Risk factors for stroke/Stroke support groups for patients, families, and friends/Signs and symptoms of stroke/Call 911 for stroke/Need for follow up after discharge/Prescribed medications/Stroke education booklet

## 2020-11-12 NOTE — PROGRESS NOTE ADULT - SUBJECTIVE AND OBJECTIVE BOX
Patient a bit more depressed laying in bed.  Noted to have return of her trace LE and UE movement.  Was denied SC plan for GC.     REVIEW OF SYSTEMS  Constitutional - No fever,  +fatigue  Neurological - +loss of strength, No numbness    FUNCTIONAL PROGRESS  11/10  Bed Mobility: Rolling/Turning:     · Level of [place]	moderate assist (50% patients effort)  · Physical Assist/Nonphysical Assist	1 person assist; verbal cues  · Assistive Device	bed rails    Bed Mobility: Scooting/Bridging:     · Level of [place]	maximum assist (25% patients effort); to scoot toward EOB  	  · Physical Assist/Nonphysical Assist	1 person assist; verbal cues    Bed Mobility: Sit to Supine:     · Level of [place]	maximum assist (25% patients effort)  · Physical Assist/Nonphysical Assist	verbal cues; 2 person assist  · Assistive Device	head of bed elevated    Bed Mobility: Supine to Sit:     · Level of [place]	maximum assist (25% patients effort)  · Physical Assist/Nonphysical Assist	1 person assist; verbal cues  · Assistive Device	bed rails; head of bed elevated    Bed Mobility Analysis:     · Bed Mobility Limitations	decreased ability to use legs for bridging/pushing; decreased ability to use arms for pushing/pulling  · Impairments Contributing to Impaired Bed Mobility	impaired balance; impaired coordination; impaired motor control; impaired postural control; decreased ROM; decreased strength; +Left charity; abnormal muscle tone    Transfer: Sit to Stand:     · Level of [place]	moderate assist (50% patients effort)  · Physical Assist/Nonphysical Assist	2 person assist; verbal cues; Cues for sequencing of movement and for safety for proper hand placement prior to/during transfer  · Weight-Bearing Restrictions	full weight-bearing  · Assistive Device	bilateral assist +blocking Left knee/foot    Transfer: Stand to Sit:     · Level of [place]	moderate assist (50% patients effort)  · Physical Assist/Nonphysical Assist	2 person assist; verbal cues  · Weight-Bearing Restrictions	full weight-bearing  · Assistive Device	bilateral assist +blocking Left knee/foot    Sit/Stand Transfer Safety Analysis:     · Transfer Safety Concerns Noted	decreased weight-shifting ability; decreased sequencing ability  · Impairments Contributing to Impaired Transfers	impaired balance; impaired coordination; decreased flexibility; impaired motor control; abnormal muscle tone; pain; impaired postural control; decreased ROM; decreased strength; +Left charity    Bathing Training:     · Level of [place]	maximum assist (25% patients effort); to sponge bathe  · Physical Assist/Nonphysical Assist	1 person assist; verbal cues; set-up required    Upper Body Dressing Training:     · Level of [place]	moderate assist (50% patients effort); seated to don/doff gown  · Physical Assist/Nonphysical Assist	1 person assist; verbal cues; set-up required; Pt educated in chariyt dressing techniques    Lower Body Dressing Training:     · Level of [place]	dependent (less than 25% patients effort); to don/doff socks  	    Toilet Hygiene Training:     · Level of [place]	dependent (less than 25% patients effort); +Primafit    Grooming Training:     · Level of [place]	minimum assist (75% patients effort); seated  · Physical Assist/Nonphysical Assist	1 person assist; verbal cues; set-up required; Pt is right hand dominant. Pt requires assist with opening containers and manipulating packaging/objects    Eating/Self-Feeding Training:     · Level of [place]	Did not directly observe          VITALS  T(C): 36.9 (11-12-20 @ 08:08), Max: 36.9 (11-12-20 @ 08:08)  HR: 67 (11-12-20 @ 08:08) (67 - 88)  BP: 130/72 (11-12-20 @ 08:08) (123/74 - 145/80)  RR: 19 (11-12-20 @ 08:08) (18 - 19)  SpO2: 97% (11-12-20 @ 05:04) (94% - 97%)  Wt(kg): --    MEDICATIONS   aspirin  chewable 81 milliGRAM(s) daily  atorvastatin 80 milliGRAM(s) at bedtime  dextrose 40% Gel 15 Gram(s) once PRN  dextrose 5%. 1000 milliLiter(s) <Continuous>  dextrose 50% Injectable 12.5 Gram(s) once  dextrose 50% Injectable 25 Gram(s) once  dextrose 50% Injectable 25 Gram(s) once  enoxaparin Injectable 40 milliGRAM(s) daily  FLUoxetine 20 milliGRAM(s) daily  gabapentin 100 milliGRAM(s) three times a day  glucagon  Injectable 1 milliGRAM(s) once PRN  insulin glargine Injectable (LANTUS) 12 Unit(s) at bedtime  insulin lispro (ADMELOG) corrective regimen sliding scale   three times a day before meals  insulin lispro Injectable (ADMELOG) 6 Unit(s) three times a day before meals  labetalol Injectable 10 milliGRAM(s) every 6 hours PRN  lisinopril 5 milliGRAM(s) daily  pantoprazole    Tablet 40 milliGRAM(s) before breakfast  polyethylene glycol 3350 17 Gram(s) two times a day  senna 2 Tablet(s) at bedtime      RECENT LABS/IMAGING - Reviewed                        CT HEAD: No acute intracranial bleeding.   CT PERFUSION: No regional perfusion abnormality.  CTA BRAIN: No flow-limiting stenosis or occlusion. Nondominant left vertebral artery terminates as PICA, anatomic variant..  CTA NECK: Moderate stenosis of the ICA origin due to calcified and noncalcified atheromatous plaque.    MRI HEAD 11/8 - Parenchymal volume loss and chronic microvascular ischemic changes are identified. Abnormal T2 prolongation restricted diffusion is seen involving the right lenticular nucleus/corona radiata region. This is compatible with an acute infarct. No hemorrhagic transformation is seen. No significant shift or herniation is seen. The large vessels demonstrate normal flow voids. The visualized paranasal sinuses mastoid eminence appear clear. IMPRESSION: Acute infarct as described above.    TTE 11/8 - Summary:   1. Left ventricular ejection fraction, by visual estimation, is 65 to 70%.   2. Normal global left ventricular systolic function.   3. Spectral Doppler shows impaired relaxation pattern.   4. Normal RV size and function, estimated PASP least 29 mmHg.   5. Trace mitral valve regurgitation.   6. Mild tricuspid regurgitation.   7. The ascending aorta is mildly dilated measuring 4.2 cm.   8. Limited color Doppler interrogation showed no obvious atrial septal defect.   9. There is no evidence of pericardial effusion.  10. Study cannot exclude the absence of a PFO, consider MARIAH if clinically indicated.  ----------------------------------------------------------------------------------------  PHYSICAL EXAM  Constitutional - NAD, Comfortable  Extremities - No C/C/E, No calf tenderness   Neurologic Exam -                    Cognitive - Awake, Alert, AAO to self, situation     Communication - Fluent, +dysarthria     Cranial Nerves - Left lip droop     Motor -                     LEFT    UE - ShAB 1/5, EF 0/5, EE 0/5, WE 0/5,  0/5                    RIGHT UE - ShAB 5/5, EF 5/5, EE 5/5, WE 5/5,  5/5                    LEFT    LE - HF 1/5, KE 1/5, DF 0/5, PF 0/5                    RIGHT LE - HF 5/5, KE 5/5, DF 5/5, PF 5/5        Sensory - Intact to LT  Psychiatric - Flat, Depressed  ----------------------------------------------------------------------------------------  ASSESSMENT/PLAN  65yFemale with functional deficits after an acute CVA  Acute Right lenticular nucleus/corona radiata - ASA, Lipitor  Uncontrolled DM2 - Lantus, Lispro  Uncontrolled HTN - Lisinopril, Recommend change Labetalol to PO  Mood/Motor Recovery - Prozac 20mg (11/8)  Oral Dysphagia - Soft/Thins  Pain - Tylenol, Neurontin   DVT PPX - SCDs, Lovenox  Rehab - Continue to recommend ACUTE inpatient rehabilitation for the functional deficits consisting of 3 hours of therapy/day & 24 hour RN/daily PMR physician for comorbid medical management. Will continue to follow for ongoing rehab needs and recommendations. Patient will be able to tolerate 3 hours a day.    Continue bedside therapy as well as OOB throughout the day with mobilization throughout the day with staff to maintain cardiopulmonary function and prevention of secondary complications related to debility.     Discussed with rehab clinical team.

## 2020-11-12 NOTE — H&P ADULT - ASSESSMENT
Pt is a 64 y/o F with PMHx of T2DM, who presented to [facility] on 11/8/20 with acute onset of left sided weakness and slurred speech, found to have acute infarct of right lenticular nucleus/corona radiata region. Pt with functional deficits with ADLs, ambulation, and expression.    #CVA  - right lenticular nucleus/corona radiata region infarct  - continue aspirin 81mg  - continue Lipitor  - BP control  - Prozac for motor recovery  - neurology follow up as outpatient  - PT/OT/SLP      #HTN  - continue Lisinopril 5mg daily  - Continue to monitor  - hospitalist follow up    #T2DM  - continue Lantus 12u at bedtime  - continue Lispro 6u before meals  - ISS  - monitor fingersticks    #Mood  - continue Prozac  - neuropsych    #Pain  - Tylenol PRN  - Gabapentin 100mg tid    #GI  - senna  - Protonix    #  - bladder scan on admission and straight cath as necessary    #Diet  - Dysphagia 3 Soft - Thin liquids  - Consistent carb, low sodium    #DVT ppx  - Lovenox      MEDICAL PROGNOSIS: GOOD                                   REHAB POTENTIAL: GOOD  ESTIMATED DISPOSITION: HOME                             ELOS: 10-14 Days   EXPECTED THERAPY:     P.T. 1 hr/day       O.T. 1 hr/day      S.L.P. 1 hr/day      EXP FREQUENCY: 5 days per 7 day period     PRESCREEN COMPARISON I have reviewed the prescreen information and I have found no relevant changes between the preadmission screening and my post admission evaluation     RATIONALE FOR INPATIENT ADMISSION - Patient demonstrates the following: (check all that apply)  [X] Medically appropriate for rehabilitation admission  [X] Has attainable rehab goals with an appropriate initial discharge plan  [X] Has rehabilitation potential (expected to make a significant improvement within a reasonable period of time)   [X] Requires close medical management by a rehab physician, rehab nursing care, Hospitalist and comprehensive interdisciplinary team (including PT, OT, & or SLP, Prosthetics and Orthotics)     Pt is a 66 y/o F with PMHx of T2DM, who presented to [facility] on 11/8/20 with acute onset of left sided weakness and slurred speech, found to have acute infarct of right lenticular nucleus/corona radiata region. Pt with functional deficits with ADLs, ambulation, and expression.    #CVA  - right lenticular nucleus/corona radiata region infarct  - continue aspirin 81mg  - continue Lipitor  - BP control  - Prozac for motor recovery  - neurology follow up as outpatient  - PT/OT/SLP      #HTN  - continue Lisinopril 5mg daily  - Continue to monitor  - hospitalist follow up    #T2DM  - continue Lantus 12u at bedtime  - continue Lispro 6u before meals  - ISS  - monitor fingersticks    #Mood  - continue Prozac  - neuropsych    #Pain  - Tylenol PRN  - Gabapentin 100mg tid    #GI  - senna, miralax  - Protonix    #  - bladder scan on admission and straight cath as necessary    #Diet  - Dysphagia 3 Soft - Thin liquids  - Consistent carb, low sodium    #DVT ppx  - Lovenox      MEDICAL PROGNOSIS: GOOD                                   REHAB POTENTIAL: GOOD  ESTIMATED DISPOSITION: HOME                             ELOS: 10-14 Days   EXPECTED THERAPY:     P.T. 1 hr/day       O.T. 1 hr/day      S.L.P. 1 hr/day      EXP FREQUENCY: 5 days per 7 day period     PRESCREEN COMPARISON I have reviewed the prescreen information and I have found no relevant changes between the preadmission screening and my post admission evaluation     RATIONALE FOR INPATIENT ADMISSION - Patient demonstrates the following: (check all that apply)  [X] Medically appropriate for rehabilitation admission  [X] Has attainable rehab goals with an appropriate initial discharge plan  [X] Has rehabilitation potential (expected to make a significant improvement within a reasonable period of time)   [X] Requires close medical management by a rehab physician, rehab nursing care, Hospitalist and comprehensive interdisciplinary team (including PT, OT, & or SLP, Prosthetics and Orthotics)

## 2020-11-12 NOTE — H&P ADULT - HISTORY OF PRESENT ILLNESS
Pt is a 66 y/o F with PMHx of T2DM, who presented to [facility] on 11/8/20 with acute onset of left sided weakness and slurred speech. Pt was outside of window for tPA. On admission, her BP was 202/109. CT head showed chronic ischemic changes, no acute hemorrhage, or ischemic penumbra. Moderate stenosis of the ICA origin due to calcified and noncalcified atheromatous plaque. MRI showed acute infarct in the right lenticular nucleus/corona radiata region. She wsa evaluated by PM&R and PT and was recommended for acute rehab. She was deemed stable for discharge on 11/12/20 and was transferred to [facility] for acute inpatient rehabilitation.

## 2020-11-12 NOTE — H&P ADULT - ATTENDING COMMENTS
Chart reviewed   Patient in OT session and telephone interpretation used as patient [place]i Creole speaking.   I have reviewed the resident's note and edited as needed  Begin full rehab program  See daily progress note  Hospitalist consult for management of medical comorbidities

## 2020-11-13 LAB
ALBUMIN SERPL ELPH-MCNC: 3.1 G/DL — LOW (ref 3.3–5)
ALP SERPL-CCNC: 87 U/L — SIGNIFICANT CHANGE UP (ref 40–120)
ALT FLD-CCNC: 15 U/L — SIGNIFICANT CHANGE UP (ref 10–45)
ANION GAP SERPL CALC-SCNC: 6 MMOL/L — SIGNIFICANT CHANGE UP (ref 5–17)
AST SERPL-CCNC: 10 U/L — SIGNIFICANT CHANGE UP (ref 10–40)
BILIRUB SERPL-MCNC: 0.3 MG/DL — SIGNIFICANT CHANGE UP (ref 0.2–1.2)
BUN SERPL-MCNC: 20 MG/DL — SIGNIFICANT CHANGE UP (ref 7–23)
CALCIUM SERPL-MCNC: 9 MG/DL — SIGNIFICANT CHANGE UP (ref 8.4–10.5)
CHLORIDE SERPL-SCNC: 103 MMOL/L — SIGNIFICANT CHANGE UP (ref 96–108)
CO2 SERPL-SCNC: 31 MMOL/L — SIGNIFICANT CHANGE UP (ref 22–31)
CREAT SERPL-MCNC: 1.06 MG/DL — SIGNIFICANT CHANGE UP (ref 0.5–1.3)
GLUCOSE BLDC GLUCOMTR-MCNC: 160 MG/DL — HIGH (ref 70–99)
GLUCOSE BLDC GLUCOMTR-MCNC: 185 MG/DL — HIGH (ref 70–99)
GLUCOSE BLDC GLUCOMTR-MCNC: 219 MG/DL — HIGH (ref 70–99)
GLUCOSE BLDC GLUCOMTR-MCNC: 284 MG/DL — HIGH (ref 70–99)
GLUCOSE SERPL-MCNC: 211 MG/DL — HIGH (ref 70–99)
HCT VFR BLD CALC: 35.6 % — SIGNIFICANT CHANGE UP (ref 34.5–45)
HGB BLD-MCNC: 10.9 G/DL — LOW (ref 11.5–15.5)
MCHC RBC-ENTMCNC: 26.8 PG — LOW (ref 27–34)
MCHC RBC-ENTMCNC: 30.6 GM/DL — LOW (ref 32–36)
MCV RBC AUTO: 87.5 FL — SIGNIFICANT CHANGE UP (ref 80–100)
NRBC # BLD: 0 /100 WBCS — SIGNIFICANT CHANGE UP (ref 0–0)
PLATELET # BLD AUTO: 269 K/UL — SIGNIFICANT CHANGE UP (ref 150–400)
POTASSIUM SERPL-MCNC: 4.4 MMOL/L — SIGNIFICANT CHANGE UP (ref 3.5–5.3)
POTASSIUM SERPL-SCNC: 4.4 MMOL/L — SIGNIFICANT CHANGE UP (ref 3.5–5.3)
PROT SERPL-MCNC: 8 G/DL — SIGNIFICANT CHANGE UP (ref 6–8.3)
RAPID RVP RESULT: DETECTED
RBC # BLD: 4.07 M/UL — SIGNIFICANT CHANGE UP (ref 3.8–5.2)
RBC # FLD: 13.5 % — SIGNIFICANT CHANGE UP (ref 10.3–14.5)
RV+EV RNA SPEC QL NAA+PROBE: DETECTED
SODIUM SERPL-SCNC: 140 MMOL/L — SIGNIFICANT CHANGE UP (ref 135–145)
WBC # BLD: 9.03 K/UL — SIGNIFICANT CHANGE UP (ref 3.8–10.5)
WBC # FLD AUTO: 9.03 K/UL — SIGNIFICANT CHANGE UP (ref 3.8–10.5)

## 2020-11-13 PROCEDURE — 99222 1ST HOSP IP/OBS MODERATE 55: CPT

## 2020-11-13 PROCEDURE — 99223 1ST HOSP IP/OBS HIGH 75: CPT | Mod: GC,AI

## 2020-11-13 RX ORDER — INSULIN GLARGINE 100 [IU]/ML
15 INJECTION, SOLUTION SUBCUTANEOUS AT BEDTIME
Refills: 0 | Status: DISCONTINUED | OUTPATIENT
Start: 2020-11-13 | End: 2020-11-15

## 2020-11-13 RX ADMIN — INSULIN GLARGINE 15 UNIT(S): 100 INJECTION, SOLUTION SUBCUTANEOUS at 21:29

## 2020-11-13 RX ADMIN — Medication 1: at 12:08

## 2020-11-13 RX ADMIN — POLYETHYLENE GLYCOL 3350 17 GRAM(S): 17 POWDER, FOR SOLUTION ORAL at 06:23

## 2020-11-13 RX ADMIN — SENNA PLUS 2 TABLET(S): 8.6 TABLET ORAL at 21:29

## 2020-11-13 RX ADMIN — Medication 20 MILLIGRAM(S): at 11:58

## 2020-11-13 RX ADMIN — PANTOPRAZOLE SODIUM 40 MILLIGRAM(S): 20 TABLET, DELAYED RELEASE ORAL at 06:22

## 2020-11-13 RX ADMIN — Medication 6 UNIT(S): at 08:28

## 2020-11-13 RX ADMIN — Medication 3: at 17:10

## 2020-11-13 RX ADMIN — Medication 2: at 08:30

## 2020-11-13 RX ADMIN — POLYETHYLENE GLYCOL 3350 17 GRAM(S): 17 POWDER, FOR SOLUTION ORAL at 17:06

## 2020-11-13 RX ADMIN — Medication 81 MILLIGRAM(S): at 11:58

## 2020-11-13 RX ADMIN — Medication 6 UNIT(S): at 12:08

## 2020-11-13 RX ADMIN — GABAPENTIN 100 MILLIGRAM(S): 400 CAPSULE ORAL at 21:29

## 2020-11-13 RX ADMIN — GABAPENTIN 100 MILLIGRAM(S): 400 CAPSULE ORAL at 06:22

## 2020-11-13 RX ADMIN — LISINOPRIL 5 MILLIGRAM(S): 2.5 TABLET ORAL at 06:23

## 2020-11-13 RX ADMIN — Medication 6 UNIT(S): at 17:10

## 2020-11-13 RX ADMIN — ATORVASTATIN CALCIUM 80 MILLIGRAM(S): 80 TABLET, FILM COATED ORAL at 21:29

## 2020-11-13 RX ADMIN — GABAPENTIN 100 MILLIGRAM(S): 400 CAPSULE ORAL at 13:12

## 2020-11-13 RX ADMIN — ENOXAPARIN SODIUM 40 MILLIGRAM(S): 100 INJECTION SUBCUTANEOUS at 11:58

## 2020-11-13 NOTE — PROGRESS NOTE ADULT - ASSESSMENT
66 y/o F with PMHx of T2DM, who presented to [facility] on 11/8/20 with acute onset of left sided weakness and slurred speech, found to have acute infarct of right lenticular nucleus/corona radiata region.     Right lenticular nucleus/corona radiata region infarct with left hemiplegia, left facial droop  - continue aspirin 81mg and - continue Lipitor  - Prozac for motor recovery  - PT/OT/SLP: total of 3 hrs/day 5 days/week       HTN:- continue Lisinopril 5mg daily, - Continue to monitor      T2DM: with hyperglycemia:  continue Lantus 15u at bedtime ( increased 11/13) and - continue Lispro 6u before meals  - ISS, - monitor fingersticks    Mood:- continue Prozac, - neuropsych as needed     Pain:-Continue Gabapentin 100mg tid  Start analgesics as needed     GI:- senna, miralax  - Protonix    :- bladder scan on admission and straight cath as necessary    Diet:- Dysphagia 3 Soft - Thin liquids  - Consistent carb, low sodium    DVT ppx:- Lovenox    Precautions: fall,   Positioning: lWC - lap tray or trough on left for support     Hospitalist consult noted

## 2020-11-13 NOTE — CONSULT NOTE ADULT - SUBJECTIVE AND OBJECTIVE BOX
HPI:  Pt is a 64 y/o F with PMHx of T2DM, who presented to [facility] on 11/8/20 with acute onset of left sided weakness and slurred speech. Pt was outside of window for tPA. On admission, her BP was 202/109. CT head showed chronic ischemic changes, no acute hemorrhage, or ischemic penumbra. Moderate stenosis of the ICA origin due to calcified and noncalcified atheromatous plaque. MRI showed acute infarct in the right lenticular nucleus/corona radiata region. She wsa evaluated by PM&R and PT and was recommended for acute rehab. She was deemed stable for discharge on 11/12/20 and was transferred to [facility] for acute inpatient rehabilitation.  (12 Nov 2020 15:51)      PAST MEDICAL & SURGICAL HISTORY:  GERD (gastroesophageal reflux disease)    DM (diabetes mellitus)    No significant past surgical history        FAMILY HISTORY:      Social History:  SOCIAL HISTORY  - Smoking: denied  - Alcohol: denied  - Drug Use: denied    FUNCTIONAL HISTORY  Pt lives with her daughter and her family in a 1 story house with 2 steps to enter without a rail. Prior to admission, pt was fully independent with all ADLs and ambulation. Daughter and family are around for assistance.    CURRENT FUNCTIONAL STATUS  11/9  Bed Mobility: Sit to Supine:   · Level of [place]	maximum assist (25% patients effort)  · Physical Assist/Nonphysical Assist	2 person assist  Bed Mobility: Supine to Sit:   · Level of [place]	maximum assist (25% patients effort)  · Physical Assist/Nonphysical Assist	2 person assist  Bed Mobility Analysis:   · Bed Mobility Limitations	decreased ability to use arms for pushing/pulling; decreased ability to use legs for bridging/pushing; impaired ability to control trunk for mobility  · Impairments Contributing to Impaired Bed Mobility	impaired balance; decreased strength; impaired motor control; abnormal muscle tone  Transfer Skill: Bed to Chair   · Level of [place]	unable to perform; safety concerns due to mobility status  Transfer: Chair to Bed:   · Level of [place]	unable to perform; safety concerns due to mobility status    Transfer: Sit to Stand:     · Level of [place]	maximum assist (25% patients effort)  · Physical Assist/Nonphysical Assist	2 person assist  · Assistive Device	manual assist x2 blocking left knee    Transfer: Stand to Sit:     · Level of [place]	maximum assist (25% patients effort)  · Physical Assist/Nonphysical Assist	2 person assist  · Assistive Device	manual assist x2 blocking left knee (12 Nov 2020 15:51)      MEDICATIONS  (STANDING):  aspirin  chewable 81 milliGRAM(s) Oral daily  atorvastatin 80 milliGRAM(s) Oral at bedtime  dextrose 40% Gel 15 Gram(s) Oral once  dextrose 5%. 1000 milliLiter(s) (50 mL/Hr) IV Continuous <Continuous>  dextrose 5%. 1000 milliLiter(s) (100 mL/Hr) IV Continuous <Continuous>  dextrose 50% Injectable 25 Gram(s) IV Push once  dextrose 50% Injectable 12.5 Gram(s) IV Push once  dextrose 50% Injectable 25 Gram(s) IV Push once  enoxaparin Injectable 40 milliGRAM(s) SubCutaneous daily  FLUoxetine 20 milliGRAM(s) Oral daily  gabapentin 100 milliGRAM(s) Oral three times a day  glucagon  Injectable 1 milliGRAM(s) IntraMuscular once  insulin glargine Injectable (LANTUS) 12 Unit(s) SubCutaneous at bedtime  insulin lispro (ADMELOG) corrective regimen sliding scale   SubCutaneous three times a day before meals  insulin lispro (ADMELOG) corrective regimen sliding scale   SubCutaneous at bedtime  insulin lispro Injectable (ADMELOG) 6 Unit(s) SubCutaneous three times a day before meals  lisinopril 5 milliGRAM(s) Oral daily  pantoprazole    Tablet 40 milliGRAM(s) Oral before breakfast  polyethylene glycol 3350 17 Gram(s) Oral two times a day  senna 2 Tablet(s) Oral at bedtime    MEDICATIONS  (PRN):    CONSTITUTIONAL: No fevers or chills  EYES/ENT: No visual changes;  No vertigo or throat pain   NECK: No pain or stiffness  RESPIRATORY: No cough, wheezing, or shortness of breath  CARDIOVASCULAR: No chest pain or palpitations  GASTROINTESTINAL: No abdominal or epigastric pain. No nausea or vomiting. No diarrhea or constipation.   GENITOURINARY: No dysuria, frequency or hematuria  NEUROLOGICAL: No numbness, +weakness  SKIN: No itching, rashes    Vital Signs Last 24 Hrs  T(C): 36.8 (12 Nov 2020 19:50), Max: 37.1 (12 Nov 2020 17:15)  T(F): 98.3 (12 Nov 2020 19:50), Max: 98.7 (12 Nov 2020 17:15)  HR: 80 (13 Nov 2020 06:26) (59 - 92)  BP: 157/77 (13 Nov 2020 06:26) (124/77 - 157/77)  BP(mean): --  RR: 16 (12 Nov 2020 19:50) (16 - 18)  SpO2: 95% (12 Nov 2020 19:50) (95% - 95%)    PHYSICAL EXAM:  GENERAL: NAD  NECK: Supple  NERVOUS SYSTEM:  awake and alert  HEART: S1s2 NL , RRR  CHEST/LUNG: Clear to percussion bilaterally  ABDOMEN: Soft, Nontender, Nondistended; Bowel sounds present  EXTREMITIES:  No edema    CBC Full  -  ( 13 Nov 2020 06:15 )  WBC Count : 9.03 K/uL  RBC Count : 4.07 M/uL  Hemoglobin : 10.9 g/dL  Hematocrit : 35.6 %  Platelet Count - Automated : 269 K/uL  Mean Cell Volume : 87.5 fl  Mean Cell Hemoglobin : 26.8 pg  Mean Cell Hemoglobin Concentration : 30.6 gm/dL  Auto Neutrophil # : x  Auto Lymphocyte # : x  Auto Monocyte # : x  Auto Eosinophil # : x  Auto Basophil # : x  Auto Neutrophil % : x  Auto Lymphocyte % : x  Auto Monocyte % : x  Auto Eosinophil % : x  Auto Basophil % : x    11-13    140  |  103  |  20  ----------------------------<  211<H>  4.4   |  31  |  1.06    Ca    9.0      13 Nov 2020 06:15    TPro  8.0  /  Alb  3.1<L>  /  TBili  0.3  /  DBili  x   /  AST  10  /  ALT  15  /  AlkPhos  87  11-13    CAPILLARY BLOOD GLUCOSE      POCT Blood Glucose.: 219 mg/dL (13 Nov 2020 08:18)  POCT Blood Glucose.: 223 mg/dL (12 Nov 2020 21:34)  POCT Blood Glucose.: 283 mg/dL (12 Nov 2020 17:21)  POCT Blood Glucose.: 142 mg/dL (12 Nov 2020 11:32)    LIVER FUNCTIONS - ( 13 Nov 2020 06:15 )  Alb: 3.1 g/dL / Pro: 8.0 g/dL / ALK PHOS: 87 U/L / ALT: 15 U/L / AST: 10 U/L / GGT: x                 HEALTH ISSUES - PROBLEM Dx:      #CVA  - right lenticular nucleus/corona radiata region infarct  - continue aspirin 81mg  - continue Lipitor  - BP control  - Prozac for motor recovery  - neurology follow up as outpatient  - PT/OT/SLP      #HTN  - continue Lisinopril 5mg daily  - Continue to monitor  - hospitalist follow up    #T2DM  - continue Lantus 12u at bedtime  - continue Lispro 6u before meals  - ISS  - monitor fingersticks    #Mood  - continue Prozac  - neuropsych    #Pain  - Tylenol PRN  - Gabapentin 100mg tid    #GI  - senna, miralax  - Protonix    #  - bladder scan on admission and straight cath as necessary    #Diet  - Dysphagia 3 Soft - Thin liquids  - Consistent carb, low sodium    #DVT ppx  - Lovenox         HPI:  Pt is a 64 y/o F with PMHx of T2DM, who presented to [facility] on 11/8/20 with acute onset of left sided weakness and slurred speech. Pt was outside of window for tPA. On admission, her BP was 202/109. CT head showed chronic ischemic changes, no acute hemorrhage, or ischemic penumbra. Moderate stenosis of the ICA origin due to calcified and noncalcified atheromatous plaque. MRI showed acute infarct in the right lenticular nucleus/corona radiata region. She wsa evaluated by PM&R and PT and was recommended for acute rehab. She was deemed stable for discharge on 11/12/20 and was transferred to [facility] for acute inpatient rehabilitation.  (12 Nov 2020 15:51)    CC:  No issue overnight.     PAST MEDICAL & SURGICAL HISTORY:  GERD (gastroesophageal reflux disease)    DM (diabetes mellitus)    No significant past surgical history        FAMILY HISTORY:  Non pertinent    SOCIAL HISTORY  - Smoking: denied  - Alcohol: denied  - Drug Use: denied    MEDICATIONS  (STANDING):  aspirin  chewable 81 milliGRAM(s) Oral daily  atorvastatin 80 milliGRAM(s) Oral at bedtime  dextrose 40% Gel 15 Gram(s) Oral once  dextrose 5%. 1000 milliLiter(s) (50 mL/Hr) IV Continuous <Continuous>  dextrose 5%. 1000 milliLiter(s) (100 mL/Hr) IV Continuous <Continuous>  dextrose 50% Injectable 25 Gram(s) IV Push once  dextrose 50% Injectable 12.5 Gram(s) IV Push once  dextrose 50% Injectable 25 Gram(s) IV Push once  enoxaparin Injectable 40 milliGRAM(s) SubCutaneous daily  FLUoxetine 20 milliGRAM(s) Oral daily  gabapentin 100 milliGRAM(s) Oral three times a day  glucagon  Injectable 1 milliGRAM(s) IntraMuscular once  insulin glargine Injectable (LANTUS) 12 Unit(s) SubCutaneous at bedtime  insulin lispro (ADMELOG) corrective regimen sliding scale   SubCutaneous three times a day before meals  insulin lispro (ADMELOG) corrective regimen sliding scale   SubCutaneous at bedtime  insulin lispro Injectable (ADMELOG) 6 Unit(s) SubCutaneous three times a day before meals  lisinopril 5 milliGRAM(s) Oral daily  pantoprazole    Tablet 40 milliGRAM(s) Oral before breakfast  polyethylene glycol 3350 17 Gram(s) Oral two times a day  senna 2 Tablet(s) Oral at bedtime    MEDICATIONS  (PRN):    CONSTITUTIONAL: No fevers or chills  EYES/ENT: No visual changes;  No vertigo or throat pain   NECK: No pain or stiffness  RESPIRATORY: No cough, wheezing, or shortness of breath  CARDIOVASCULAR: No chest pain or palpitations  GASTROINTESTINAL: No abdominal or epigastric pain. No nausea or vomiting. No diarrhea or constipation.   GENITOURINARY: No dysuria, frequency or hematuria  NEUROLOGICAL: No numbness, +weakness  SKIN: No itching, rashes    Vital Signs Last 24 Hrs  T(C): 36.8 (12 Nov 2020 19:50), Max: 37.1 (12 Nov 2020 17:15)  T(F): 98.3 (12 Nov 2020 19:50), Max: 98.7 (12 Nov 2020 17:15)  HR: 80 (13 Nov 2020 06:26) (59 - 92)  BP: 157/77 (13 Nov 2020 06:26) (124/77 - 157/77)  BP(mean): --  RR: 16 (12 Nov 2020 19:50) (16 - 18)  SpO2: 95% (12 Nov 2020 19:50) (95% - 95%)    PHYSICAL EXAM:  GENERAL: NAD  NECK: Supple  NERVOUS SYSTEM:  awake and alert  HEART: S1s2 NL , RRR  CHEST/LUNG: Clear to percussion bilaterally  ABDOMEN: Soft, Nontender, Nondistended; Bowel sounds present  EXTREMITIES:  No edema    CBC Full  -  ( 13 Nov 2020 06:15 )  WBC Count : 9.03 K/uL  RBC Count : 4.07 M/uL  Hemoglobin : 10.9 g/dL  Hematocrit : 35.6 %  Platelet Count - Automated : 269 K/uL  Mean Cell Volume : 87.5 fl  Mean Cell Hemoglobin : 26.8 pg  Mean Cell Hemoglobin Concentration : 30.6 gm/dL  Auto Neutrophil # : x  Auto Lymphocyte # : x  Auto Monocyte # : x  Auto Eosinophil # : x  Auto Basophil # : x  Auto Neutrophil % : x  Auto Lymphocyte % : x  Auto Monocyte % : x  Auto Eosinophil % : x  Auto Basophil % : x    11-13    140  |  103  |  20  ----------------------------<  211<H>  4.4   |  31  |  1.06    Ca    9.0      13 Nov 2020 06:15    TPro  8.0  /  Alb  3.1<L>  /  TBili  0.3  /  DBili  x   /  AST  10  /  ALT  15  /  AlkPhos  87  11-13    CAPILLARY BLOOD GLUCOSE      POCT Blood Glucose.: 219 mg/dL (13 Nov 2020 08:18)  POCT Blood Glucose.: 223 mg/dL (12 Nov 2020 21:34)  POCT Blood Glucose.: 283 mg/dL (12 Nov 2020 17:21)  POCT Blood Glucose.: 142 mg/dL (12 Nov 2020 11:32)    LIVER FUNCTIONS - ( 13 Nov 2020 06:15 )  Alb: 3.1 g/dL / Pro: 8.0 g/dL / ALK PHOS: 87 U/L / ALT: 15 U/L / AST: 10 U/L / GGT: x                 HEALTH ISSUES - PROBLEM Dx:    CVA  PT/OT per rehab  ASA/Statin    HTN  lisinopril    DM2, a1c 7.9%  Inc Lantus to 15  Lispro premeal/SS    DVT ppx  Lovenox

## 2020-11-13 NOTE — DIETITIAN INITIAL EVALUATION ADULT. - PERTINENT LABORATORY DATA
CAPILLARY BLOOD GLUCOSE  POCT Blood Glucose.: 160 mg/dL (13 Nov 2020 11:57)  POCT Blood Glucose.: 219 mg/dL (13 Nov 2020 08:18)  POCT Blood Glucose.: 223 mg/dL (12 Nov 2020 21:34)  POCT Blood Glucose.: 283 mg/dL (12 Nov 2020 17:21)

## 2020-11-13 NOTE — PROVIDER CONTACT NOTE (CRITICAL VALUE NOTIFICATION) - BACKGROUND
Pt admitted this evening, from [facility] s/p CVA. Order was placed for covid-19 PCR, swab collected and sent to lab at 1245.

## 2020-11-13 NOTE — DIETITIAN INITIAL EVALUATION ADULT. - OTHER INFO
64 y/o F with PMHx of T2DM, who presented to [facility] on 11/8/20 with acute onset of left sided weakness and slurred speech and MRI showed acute infarct in the right region. No edema noted, skin WNL. No concerns per interdisciplinary rounds. POCT from 11/12: 142-283mg/dL, continue Lantus 15u at bedtime (increased 11/13). No weight change per weight history; 175# (11/7). Tolerating mechanically altered diet r/t dysphagia. Will continue to monitor/follow as indicated.

## 2020-11-13 NOTE — PROGRESS NOTE ADULT - SUBJECTIVE AND OBJECTIVE BOX
HPI:  Pt is a 66 y/o F with PMHx of T2DM, who presented to [facility] on 11/8/20 with acute onset of left sided weakness and slurred speech. Pt was outside of window for tPA. On admission, her BP was 202/109. CT head showed chronic ischemic changes, no acute hemorrhage, or ischemic penumbra. Moderate stenosis of the ICA origin due to calcified and noncalcified atheromatous plaque. MRI showed acute infarct in the right lenticular nucleus/corona radiata region. She wsa evaluated by PM&R and PT and was recommended for acute rehab. She was deemed stable for discharge on 11/12/20 and was transferred to [facility] for acute inpatient rehabilitation.  (12 Nov 2020 15:51)      TODAY'S SUBJECTIVE & REVIEW OF SYMPTOMS  Chart reviewed. Patient seen at bedside  In OT session and telephone interpretor used  Patient appears comfortable and denies any acute issues   Denies HA/pain     [X] Constitutional WNL        [X] Cardio WNL              [X] Resp WNL  [X] GI WNL                            [X]  WNL                    [X] Heme WNL      PHYSICAL EXAM    Vital Signs Last 24 Hrs  T(C): 36.8 (12 Nov 2020 19:50), Max: 37.1 (12 Nov 2020 17:15)  T(F): 98.3 (12 Nov 2020 19:50), Max: 98.7 (12 Nov 2020 17:15)  HR: 80 (13 Nov 2020 06:26) (59 - 92)  BP: 157/77 (13 Nov 2020 06:26) (124/77 - 157/77)  BP(mean): --  RR: 16 (12 Nov 2020 19:50) (16 - 18)  SpO2: 95% (12 Nov 2020 19:50) (95% - 95%)    Constitutional - NAD, Comfortable  Chest - Clear  Cardiovascular - S1S2  Abdomen - BS+, Soft, NTND  Extremities - No C/C/E, No calf tenderness   Neurologic Exam - AAOX3 with some cues for date, Left facial droop, Left flaccid hemiplegia, Mild tone in LLE - at knee, 1/2 Finger breadth subluxation left shoulder . Sensory intact to LT and no extinction of DSS             Psychiatric - Mood stable, Affect WNL      MEDICATIONS  (STANDING):  aspirin  chewable 81 milliGRAM(s) Oral daily  atorvastatin 80 milliGRAM(s) Oral at bedtime  dextrose 40% Gel 15 Gram(s) Oral once  dextrose 5%. 1000 milliLiter(s) (50 mL/Hr) IV Continuous <Continuous>  dextrose 5%. 1000 milliLiter(s) (100 mL/Hr) IV Continuous <Continuous>  dextrose 50% Injectable 25 Gram(s) IV Push once  dextrose 50% Injectable 12.5 Gram(s) IV Push once  dextrose 50% Injectable 25 Gram(s) IV Push once  enoxaparin Injectable 40 milliGRAM(s) SubCutaneous daily  FLUoxetine 20 milliGRAM(s) Oral daily  gabapentin 100 milliGRAM(s) Oral three times a day  glucagon  Injectable 1 milliGRAM(s) IntraMuscular once  insulin glargine Injectable (LANTUS) 15 Unit(s) SubCutaneous at bedtime  insulin lispro (ADMELOG) corrective regimen sliding scale   SubCutaneous three times a day before meals  insulin lispro (ADMELOG) corrective regimen sliding scale   SubCutaneous at bedtime  insulin lispro Injectable (ADMELOG) 6 Unit(s) SubCutaneous three times a day before meals  lisinopril 5 milliGRAM(s) Oral daily  pantoprazole    Tablet 40 milliGRAM(s) Oral before breakfast  polyethylene glycol 3350 17 Gram(s) Oral two times a day  senna 2 Tablet(s) Oral at bedtime    MEDICATIONS  (PRN):            RECENT LABS/IMAGING                        10.9   9.03  )-----------( 269      ( 13 Nov 2020 06:15 )             35.6     11-13    140  |  103  |  20  ----------------------------<  211<H>  4.4   |  31  |  1.06    Ca    9.0      13 Nov 2020 06:15    TPro  8.0  /  Alb  3.1<L>  /  TBili  0.3  /  DBili  x   /  AST  10  /  ALT  15  /  AlkPhos  87  11-13      CAPILLARY BLOOD GLUCOSE      POCT Blood Glucose.: 160 mg/dL (13 Nov 2020 11:57)  POCT Blood Glucose.: 219 mg/dL (13 Nov 2020 08:18)  POCT Blood Glucose.: 223 mg/dL (12 Nov 2020 21:34)  POCT Blood Glucose.: 283 mg/dL (12 Nov 2020 17:21)           HPI:  Pt is a 66 y/o F with PMHx of T2DM, who presented to [facility] on 11/8/20 with acute onset of left sided weakness and slurred speech. Pt was outside of window for tPA. On admission, her BP was 202/109. CT head showed chronic ischemic changes, no acute hemorrhage, or ischemic penumbra. Moderate stenosis of the ICA origin due to calcified and noncalcified atheromatous plaque. MRI showed acute infarct in the right lenticular nucleus/corona radiata region. She wsa evaluated by PM&R and PT and was recommended for acute rehab. She was deemed stable for discharge on 11/12/20 and was transferred to [facility] for acute inpatient rehabilitation.  (12 Nov 2020 15:51)      TODAY'S SUBJECTIVE & REVIEW OF SYMPTOMS  Chart reviewed. Patient seen at bedside  In OT session and telephone interpretor used  Patient appears comfortable and denies any acute issues   Denies HA/pain     [X] Constitutional WNL        [X] Cardio WNL              [X] Resp WNL  [X] GI WNL                            [X]  WNL                    [X] Heme WNL      PHYSICAL EXAM    Vital Signs Last 24 Hrs  T(C): 36.8 (12 Nov 2020 19:50), Max: 37.1 (12 Nov 2020 17:15)  T(F): 98.3 (12 Nov 2020 19:50), Max: 98.7 (12 Nov 2020 17:15)  HR: 80 (13 Nov 2020 06:26) (59 - 92)  BP: 157/77 (13 Nov 2020 06:26) (124/77 - 157/77)  BP(mean): --  RR: 16 (12 Nov 2020 19:50) (16 - 18)  SpO2: 95% (12 Nov 2020 19:50) (95% - 95%)    Constitutional - NAD, Comfortable  Chest - Clear  Cardiovascular - S1S2  Abdomen - BS+, Soft, NTND  Extremities - No C/C/E, No calf tenderness   Neurologic Exam - AAOX3 with some cues for date, Left facial droop, Left flaccid hemiplegia, Mild tone in LLE - at knee, 1/2 Finger breadth subluxation left shoulder . Sensory intact to LT and no extinction of DSS             Psychiatric - Mood stable, Affect WNL      MEDICATIONS  (STANDING):  aspirin  chewable 81 milliGRAM(s) Oral daily  atorvastatin 80 milliGRAM(s) Oral at bedtime  dextrose 40% Gel 15 Gram(s) Oral once  dextrose 5%. 1000 milliLiter(s) (50 mL/Hr) IV Continuous <Continuous>  dextrose 5%. 1000 milliLiter(s) (100 mL/Hr) IV Continuous <Continuous>  dextrose 50% Injectable 25 Gram(s) IV Push once  dextrose 50% Injectable 12.5 Gram(s) IV Push once  dextrose 50% Injectable 25 Gram(s) IV Push once  enoxaparin Injectable 40 milliGRAM(s) SubCutaneous daily  FLUoxetine 20 milliGRAM(s) Oral daily  gabapentin 100 milliGRAM(s) Oral three times a day  glucagon  Injectable 1 milliGRAM(s) IntraMuscular once  insulin glargine Injectable (LANTUS) 15 Unit(s) SubCutaneous at bedtime  insulin lispro (ADMELOG) corrective regimen sliding scale   SubCutaneous three times a day before meals  insulin lispro (ADMELOG) corrective regimen sliding scale   SubCutaneous at bedtime  insulin lispro Injectable (ADMELOG) 6 Unit(s) SubCutaneous three times a day before meals  lisinopril 5 milliGRAM(s) Oral daily  pantoprazole    Tablet 40 milliGRAM(s) Oral before breakfast  polyethylene glycol 3350 17 Gram(s) Oral two times a day  senna 2 Tablet(s) Oral at bedtime    MEDICATIONS  (PRN):            RECENT LABS/IMAGING                        10.9   9.03  )-----------( 269      ( 13 Nov 2020 06:15 )             35.6     11-13    140  |  103  |  20  ----------------------------<  211<H>  4.4   |  31  |  1.06    Ca    9.0      13 Nov 2020 06:15    TPro  8.0  /  Alb  3.1<L>  /  TBili  0.3  /  DBili  x   /  AST  10  /  ALT  15  /  AlkPhos  87  11-13      CAPILLARY BLOOD GLUCOSE      POCT Blood Glucose.: 160 mg/dL (13 Nov 2020 11:57)  POCT Blood Glucose.: 219 mg/dL (13 Nov 2020 08:18)  POCT Blood Glucose.: 223 mg/dL (12 Nov 2020 21:34)  POCT Blood Glucose.: 283 mg/dL (12 Nov 2020 17:21)        COVID-19 PCR: NotDetec (12 Nov 2020 21:55)  COVID-19 PCR: NotDete (08 Nov 2020 04:07)    Respiratory Viral Panel with COVID-19 by BRANDON (11.12.20 @ 21:55)    Rapid RVP Result: Detected

## 2020-11-13 NOTE — DIETITIAN INITIAL EVALUATION ADULT. - ADD RECOMMEND
1. Continue diet as ordered. 2. Accommodate preferences as able. 3. Monitor weights, labs, intakes, skin.

## 2020-11-14 LAB
GLUCOSE BLDC GLUCOMTR-MCNC: 189 MG/DL — HIGH (ref 70–99)
GLUCOSE BLDC GLUCOMTR-MCNC: 190 MG/DL — HIGH (ref 70–99)
GLUCOSE BLDC GLUCOMTR-MCNC: 224 MG/DL — HIGH (ref 70–99)
GLUCOSE BLDC GLUCOMTR-MCNC: 238 MG/DL — HIGH (ref 70–99)

## 2020-11-14 PROCEDURE — 99231 SBSQ HOSP IP/OBS SF/LOW 25: CPT | Mod: GC

## 2020-11-14 PROCEDURE — 99233 SBSQ HOSP IP/OBS HIGH 50: CPT

## 2020-11-14 RX ADMIN — Medication 6 UNIT(S): at 12:31

## 2020-11-14 RX ADMIN — Medication 1: at 08:25

## 2020-11-14 RX ADMIN — Medication 1: at 12:31

## 2020-11-14 RX ADMIN — Medication 6 UNIT(S): at 08:24

## 2020-11-14 RX ADMIN — GABAPENTIN 100 MILLIGRAM(S): 400 CAPSULE ORAL at 05:41

## 2020-11-14 RX ADMIN — ATORVASTATIN CALCIUM 80 MILLIGRAM(S): 80 TABLET, FILM COATED ORAL at 21:24

## 2020-11-14 RX ADMIN — GABAPENTIN 100 MILLIGRAM(S): 400 CAPSULE ORAL at 15:07

## 2020-11-14 RX ADMIN — Medication 6 UNIT(S): at 17:14

## 2020-11-14 RX ADMIN — Medication 20 MILLIGRAM(S): at 11:56

## 2020-11-14 RX ADMIN — Medication 2: at 17:14

## 2020-11-14 RX ADMIN — INSULIN GLARGINE 15 UNIT(S): 100 INJECTION, SOLUTION SUBCUTANEOUS at 21:24

## 2020-11-14 RX ADMIN — ENOXAPARIN SODIUM 40 MILLIGRAM(S): 100 INJECTION SUBCUTANEOUS at 11:57

## 2020-11-14 RX ADMIN — GABAPENTIN 100 MILLIGRAM(S): 400 CAPSULE ORAL at 21:24

## 2020-11-14 RX ADMIN — Medication 81 MILLIGRAM(S): at 11:56

## 2020-11-14 RX ADMIN — LISINOPRIL 5 MILLIGRAM(S): 2.5 TABLET ORAL at 05:41

## 2020-11-14 RX ADMIN — POLYETHYLENE GLYCOL 3350 17 GRAM(S): 17 POWDER, FOR SOLUTION ORAL at 05:41

## 2020-11-14 RX ADMIN — PANTOPRAZOLE SODIUM 40 MILLIGRAM(S): 20 TABLET, DELAYED RELEASE ORAL at 06:00

## 2020-11-14 NOTE — CHART NOTE - NSCHARTNOTEFT_GEN_A_CORE
REHABILITATION DIAGNOSIS/IMPAIRMENT GROUP CODE:  Right MCA infarct      COMORBIDITIES/COMPLICATING CONDITIONS IMPACTING REHABILITATION:  HEALTH ISSUES - PROBLEM Dx:  left hemiparesis  Dysphagia due to stroke  Left facial droop      PAST MEDICAL & SURGICAL HISTORY:  GERD (gastroesophageal reflux disease)  DM (diabetes mellitus)          Based upon consideration of the patient's impairments, functional status, complicating conditions and any other contributing factors and after information garnered from the assessments of all therapy disciplines involved in treating the patient and other pertinent clinicians:    INTERDISCIPLINARY REHABILITATION INTERVENTIONS:    [ x  ] Transfer Training  [ x  ] Bed Mobility  [x   ] Therapeutic Exercise  [x   ] Balance/Coordination Exercises  [ x  ] Locomotion retraining  [  x ] Stairs  [ x  ] Functional Transfer Training  [ x  ] Bowel/Bladder program  [ x  ] Pain Management  [ x  ] Skin/Wound Care  [ x  ] Visual/Perceptual Training  [   ] Therapeutic Recreation Activities  [ x  ] Neuromuscular Re-education  [ x  ] Activities of Daily Living  [ x  ] Speech Exercise  [   ] Swallowing Exercises  [   ] Vital Stim  [   ] Dietary Supplements  [   ] Calorie Count  [   ] Cognitive Exercises  [   ] Congnitive/Linguistic Treatment  [   ] Behavior Program  [   ] Neuropsych Therapy  [ x  ] Patient/Family Counseling  [ x  ] Family Training  [   ] Community Re-entry  [   ] Orthotic Evaluation  [   ] Prosthetic Eval/Training    MEDICAL PROGNOSIS:  fair    REHAB POTENTIAL:  fair  EXPECTED DAILY THERAPY:         PT:1 hr/day        OT:1 hr/day        ST:1 hr/day        P&O: NA    EXPECTED INTENSITY OF PROGRAM:  3 Hrs/day    EXPECTED FREQUENCY OF PROGRAM:  5 days/week    ESTIMATED LOS:  18-20 days    ESTIMATED DISPOSITION:  home    INTERDISCIPLINARY FUNCTIONAL OUTCOMES/GOALS:         Gait/Mobility:Min assist       Transfers:Min assist       ADLs:Moderate assist       Functional Transfers:Min assist       Medication Management:Min assist       Communication:NA       Cognitive:Min assist       Dysphagia:Up grade to regular with thin       Bladder:Min assist       Bowel:Min assist

## 2020-11-14 NOTE — PROGRESS NOTE ADULT - SUBJECTIVE AND OBJECTIVE BOX
Patient is a 65y old  Female who presents with a chief complaint of 01.1: Left Body Involvement (2020 13:54)      Patient examined and interviewed for follow up with PAULA Reyes and use of  phone.  There were no major issues reported overnight and patient is without new complaints.    Participating in therapies  +BM  Denies fever, chills, myalgias, cough, shortness of breath  and all other systems were reviewed and were negative unless otherwise specified above    MEDICATIONS  (STANDING):  aspirin  chewable 81 milliGRAM(s) Oral daily  atorvastatin 80 milliGRAM(s) Oral at bedtime  dextrose 40% Gel 15 Gram(s) Oral once  dextrose 5%. 1000 milliLiter(s) (50 mL/Hr) IV Continuous <Continuous>  dextrose 5%. 1000 milliLiter(s) (100 mL/Hr) IV Continuous <Continuous>  dextrose 50% Injectable 25 Gram(s) IV Push once  dextrose 50% Injectable 12.5 Gram(s) IV Push once  dextrose 50% Injectable 25 Gram(s) IV Push once  enoxaparin Injectable 40 milliGRAM(s) SubCutaneous daily  FLUoxetine 20 milliGRAM(s) Oral daily  gabapentin 100 milliGRAM(s) Oral three times a day  glucagon  Injectable 1 milliGRAM(s) IntraMuscular once  insulin glargine Injectable (LANTUS) 15 Unit(s) SubCutaneous at bedtime  insulin lispro (ADMELOG) corrective regimen sliding scale   SubCutaneous three times a day before meals  insulin lispro (ADMELOG) corrective regimen sliding scale   SubCutaneous at bedtime  insulin lispro Injectable (ADMELOG) 6 Unit(s) SubCutaneous three times a day before meals  lisinopril 5 milliGRAM(s) Oral daily  pantoprazole    Tablet 40 milliGRAM(s) Oral before breakfast  polyethylene glycol 3350 17 Gram(s) Oral two times a day  senna 2 Tablet(s) Oral at bedtime    MEDICATIONS  (PRN):    Daily     Daily Weight in k.6 (2020 22:30)  Vital Signs Last 24 Hrs  T(C): 36.7 (2020 07:35), Max: 36.8 (2020 19:40)  T(F): 98.1 (2020 07:35), Max: 98.3 (2020 19:40)  HR: 68 (2020 07:35) (68 - 76)  BP: 128/62 (2020 07:35) (115/65 - 143/72)  BP(mean): --  RR: 14 (2020 07:35) (14 - 16)  SpO2: 95% (2020 07:35) (95% - 99%)  CAPILLARY BLOOD GLUCOSE    POCT Blood Glucose.: 189 mg/dL (2020 07:44)  POCT Blood Glucose.: 185 mg/dL (2020 21:28)  POCT Blood Glucose.: 284 mg/dL (2020 17:02)  POCT Blood Glucose.: 160 mg/dL (2020 11:57)    PHYSICAL EXAM:  GENERAL: well-developed, well nourished in no acute distress  HEAD:  Atraumatic, Normocephalic  EYES: EOMI, PERRLA, conjunctiva and sclera clear  ENMT: No tonsillar erythema, exudates, or enlargement; Moist mucous membranes, Good dentition, No lesions  NECK: Supple, No JVD, No CHONG  CHEST/LUNG: Clear to percussion bilaterally; No rales, rhonchi, wheezing, or rubs  HEART: Regular rate and rhythm; No murmurs, rubs, or gallops  ABDOMEN: Soft, Nontender, Nondistended; Bowel sounds present  EXTREMITIES:  2+ Peripheral Pulses, No clubbing, cyanosis, or edema  LYMPH: No lymphadenopathy noted  SKIN: No rashes or lesions  NERVOUS SYSTEM:  Alert & Oriented X3, no new neurologic deficits + Left HP     LABS:                      10.9   9.03  )-----------( 269      ( 2020 06:15 )             35.6     11-13    140  |  103  |  20  ----------------------------<  211<H>  4.4   |  31  |  1.06    Ca    9.0      2020 06:15    TPro  8.0  /  Alb  3.1<L>  /  TBili  0.3  /  DBili  x   /  AST  10  /  ALT  15  /  AlkPhos  87  11-13    COVID-19 PCR: NotDetec (2020 21:55)  COVID-19 PCR: NotDetec (2020 04:07)     Patient is a 65y old  Female who presents with a chief complaint of 01.1: Left Body Involvement (2020 13:54)      Patient examined and interviewed in her native language Creole for follow up with PAULA Reyes and use of  phone  ID# [id number]   There were no major issues reported overnight and patient is without new complaints.    Participating in therapies  +BM  Denies fever, chills, myalgias, cough, shortness of breath  and all other systems were reviewed and were negative unless otherwise specified above    MEDICATIONS  (STANDING):  aspirin  chewable 81 milliGRAM(s) Oral daily  atorvastatin 80 milliGRAM(s) Oral at bedtime  dextrose 40% Gel 15 Gram(s) Oral once  dextrose 5%. 1000 milliLiter(s) (50 mL/Hr) IV Continuous <Continuous>  dextrose 5%. 1000 milliLiter(s) (100 mL/Hr) IV Continuous <Continuous>  dextrose 50% Injectable 25 Gram(s) IV Push once  dextrose 50% Injectable 12.5 Gram(s) IV Push once  dextrose 50% Injectable 25 Gram(s) IV Push once  enoxaparin Injectable 40 milliGRAM(s) SubCutaneous daily  FLUoxetine 20 milliGRAM(s) Oral daily  gabapentin 100 milliGRAM(s) Oral three times a day  glucagon  Injectable 1 milliGRAM(s) IntraMuscular once  insulin glargine Injectable (LANTUS) 15 Unit(s) SubCutaneous at bedtime  insulin lispro (ADMELOG) corrective regimen sliding scale   SubCutaneous three times a day before meals  insulin lispro (ADMELOG) corrective regimen sliding scale   SubCutaneous at bedtime  insulin lispro Injectable (ADMELOG) 6 Unit(s) SubCutaneous three times a day before meals  lisinopril 5 milliGRAM(s) Oral daily  pantoprazole    Tablet 40 milliGRAM(s) Oral before breakfast  polyethylene glycol 3350 17 Gram(s) Oral two times a day  senna 2 Tablet(s) Oral at bedtime    MEDICATIONS  (PRN):    Daily     Daily Weight in k.6 (2020 22:30)  Vital Signs Last 24 Hrs  T(C): 36.7 (2020 07:35), Max: 36.8 (2020 19:40)  T(F): 98.1 (2020 07:35), Max: 98.3 (2020 19:40)  HR: 68 (2020 07:35) (68 - 76)  BP: 128/62 (2020 07:35) (115/65 - 143/72)  BP(mean): --  RR: 14 (2020 07:35) (14 - 16)  SpO2: 95% (2020 07:35) (95% - 99%)  CAPILLARY BLOOD GLUCOSE    POCT Blood Glucose.: 189 mg/dL (2020 07:44)  POCT Blood Glucose.: 185 mg/dL (2020 21:28)  POCT Blood Glucose.: 284 mg/dL (2020 17:02)  POCT Blood Glucose.: 160 mg/dL (2020 11:57)    PHYSICAL EXAM:  GENERAL: well-developed, well nourished in no acute distress  HEAD:  Atraumatic, Normocephalic  EYES: EOMI, PERRLA, conjunctiva and sclera clear  ENMT: No tonsillar erythema, exudates, or enlargement; Moist mucous membranes, Good dentition, No lesions  NECK: Supple, No JVD, No CHONG  CHEST/LUNG: Clear to percussion bilaterally; No rales, rhonchi, wheezing, or rubs  HEART: Regular rate and rhythm; No murmurs, rubs, or gallops  ABDOMEN: Soft, Nontender, Nondistended; Bowel sounds present  EXTREMITIES:  2+ Peripheral Pulses, No clubbing, cyanosis, or edema  LYMPH: No lymphadenopathy noted  SKIN: No rashes or lesions  NERVOUS SYSTEM:  Alert & Oriented X3, no new neurologic deficits + Left HP     LABS:                      10.9   9.03  )-----------( 269      ( 2020 06:15 )             35.6     -    140  |  103  |  20  ----------------------------<  211<H>  4.4   |  31  |  1.06    Ca    9.0      2020 06:15    TPro  8.0  /  Alb  3.1<L>  /  TBili  0.3  /  DBili  x   /  AST  10  /  ALT  15  /  AlkPhos  87  11-13    COVID-19 PCR: NotDetec (2020 21:55)  COVID-19 PCR: NotDetec (2020 04:07)

## 2020-11-14 NOTE — PROGRESS NOTE ADULT - ASSESSMENT
Pt is a 64 y/o F with PMHx of T2DM, who presented to [facility] on 11/8/20 with acute onset of left sided weakness and slurred speech, found to have acute infarct of right lenticular nucleus/corona radiata region. Pt with functional deficits with ADLs, ambulation, and expression.    CVA  - right lenticular nucleus/corona radiata region infarct  - continue aspirin 81mg  - continue Lipitor  - BP control  - Prozac for motor recovery  - neurology follow up as outpatient  - PT/OT/SLP    HTN  - continue Lisinopril 5mg daily    T2DM IR  - continue Lantus 12u at bedtime  - continue Lispro 6u before meals  - ISS  - monitor fingersticks  - consistent carb diet    Mood  - continue Prozac  - neuropsych    Pain  - Tylenol PRN  - Gabapentin 100mg tid    GI  - senna, miralax  - Protonix    VTE ppx  - Lovenox

## 2020-11-14 NOTE — PROGRESS NOTE ADULT - SUBJECTIVE AND OBJECTIVE BOX
Cc: Gait dysfunction    HPI: Patient with no new medical issues today.  +BM +void  Pain controlled, no chest pain, no N/V, no Fevers/Chills. No other new ROS  Has been tolerating rehabilitation program.    aspirin  chewable 81 milliGRAM(s) Oral daily  atorvastatin 80 milliGRAM(s) Oral at bedtime  dextrose 40% Gel 15 Gram(s) Oral once  dextrose 5%. 1000 milliLiter(s) IV Continuous <Continuous>  dextrose 5%. 1000 milliLiter(s) IV Continuous <Continuous>  dextrose 50% Injectable 25 Gram(s) IV Push once  dextrose 50% Injectable 12.5 Gram(s) IV Push once  dextrose 50% Injectable 25 Gram(s) IV Push once  enoxaparin Injectable 40 milliGRAM(s) SubCutaneous daily  FLUoxetine 20 milliGRAM(s) Oral daily  gabapentin 100 milliGRAM(s) Oral three times a day  glucagon  Injectable 1 milliGRAM(s) IntraMuscular once  insulin glargine Injectable (LANTUS) 15 Unit(s) SubCutaneous at bedtime  insulin lispro (ADMELOG) corrective regimen sliding scale   SubCutaneous three times a day before meals  insulin lispro (ADMELOG) corrective regimen sliding scale   SubCutaneous at bedtime  insulin lispro Injectable (ADMELOG) 6 Unit(s) SubCutaneous three times a day before meals  lisinopril 5 milliGRAM(s) Oral daily  pantoprazole    Tablet 40 milliGRAM(s) Oral before breakfast  polyethylene glycol 3350 17 Gram(s) Oral two times a day  senna 2 Tablet(s) Oral at bedtime      T(C): 36.7 (11-14-20 @ 07:35), Max: 36.8 (11-13-20 @ 19:40)  HR: 68 (11-14-20 @ 07:35) (68 - 72)  BP: 128/62 (11-14-20 @ 07:35) (125/69 - 143/72)  RR: 14 (11-14-20 @ 07:35) (14 - 16)  SpO2: 95% (11-14-20 @ 07:35) (95% - 96%)    In NAD  HEENT- EOMI  Heart- RRR, S1S2  Lungs- CTA bl.  Abd- + BS, NT  Ext- No calf pain  Neuro- Exam unchanged    RECENT LABS/IMAGING                        10.9   9.03  )-----------( 269      ( 13 Nov 2020 06:15 )             35.6     11-13    140  |  103  |  20  ----------------------------<  211<H>  4.4   |  31  |  1.06    Ca    9.0      13 Nov 2020 06:15    TPro  8.0  /  Alb  3.1<L>  /  TBili  0.3  /  DBili  x   /  AST  10  /  ALT  15  /  AlkPhos  87  11-13      CAPILLARY BLOOD GLUCOSE      POCT Blood Glucose.: 160 mg/dL (13 Nov 2020 11:57)  POCT Blood Glucose.: 219 mg/dL (13 Nov 2020 08:18)  POCT Blood Glucose.: 223 mg/dL (12 Nov 2020 21:34)  POCT Blood Glucose.: 283 mg/dL (12 Nov 2020 17:21)        COVID-19 PCR: NotDetec (12 Nov 2020 21:55)  COVID-19 PCR: NotDetec (08 Nov 2020 04:07)    Respiratory Viral Panel with COVID-19 by BRANDON (11.12.20 @ 21:55)    Rapid RVP Result: Detected        Imp: Patient with diagnosis of  CVA Left charity  admitted for comprehensive acute rehabilitation.    Plan:  - Continue therapies  - DVT prophylaxis  - Skin- Turn q2h, check skin daily  - Continue current medications; patient medically stable.   - Patient is stable to continue current rehabilitation program.

## 2020-11-15 LAB
GLUCOSE BLDC GLUCOMTR-MCNC: 181 MG/DL — HIGH (ref 70–99)
GLUCOSE BLDC GLUCOMTR-MCNC: 198 MG/DL — HIGH (ref 70–99)
GLUCOSE BLDC GLUCOMTR-MCNC: 206 MG/DL — HIGH (ref 70–99)
GLUCOSE BLDC GLUCOMTR-MCNC: 269 MG/DL — HIGH (ref 70–99)

## 2020-11-15 PROCEDURE — 99233 SBSQ HOSP IP/OBS HIGH 50: CPT

## 2020-11-15 PROCEDURE — 99231 SBSQ HOSP IP/OBS SF/LOW 25: CPT | Mod: GC

## 2020-11-15 RX ORDER — INSULIN GLARGINE 100 [IU]/ML
18 INJECTION, SOLUTION SUBCUTANEOUS AT BEDTIME
Refills: 0 | Status: DISCONTINUED | OUTPATIENT
Start: 2020-11-15 | End: 2020-11-17

## 2020-11-15 RX ADMIN — SENNA PLUS 2 TABLET(S): 8.6 TABLET ORAL at 21:22

## 2020-11-15 RX ADMIN — Medication 6 UNIT(S): at 08:35

## 2020-11-15 RX ADMIN — ENOXAPARIN SODIUM 40 MILLIGRAM(S): 100 INJECTION SUBCUTANEOUS at 11:49

## 2020-11-15 RX ADMIN — ATORVASTATIN CALCIUM 80 MILLIGRAM(S): 80 TABLET, FILM COATED ORAL at 21:21

## 2020-11-15 RX ADMIN — Medication 1: at 08:35

## 2020-11-15 RX ADMIN — INSULIN GLARGINE 18 UNIT(S): 100 INJECTION, SOLUTION SUBCUTANEOUS at 21:22

## 2020-11-15 RX ADMIN — GABAPENTIN 100 MILLIGRAM(S): 400 CAPSULE ORAL at 06:21

## 2020-11-15 RX ADMIN — Medication 2: at 12:45

## 2020-11-15 RX ADMIN — Medication 81 MILLIGRAM(S): at 11:49

## 2020-11-15 RX ADMIN — Medication 6 UNIT(S): at 12:45

## 2020-11-15 RX ADMIN — PANTOPRAZOLE SODIUM 40 MILLIGRAM(S): 20 TABLET, DELAYED RELEASE ORAL at 06:21

## 2020-11-15 RX ADMIN — Medication 6 UNIT(S): at 16:50

## 2020-11-15 RX ADMIN — LISINOPRIL 5 MILLIGRAM(S): 2.5 TABLET ORAL at 06:21

## 2020-11-15 RX ADMIN — GABAPENTIN 100 MILLIGRAM(S): 400 CAPSULE ORAL at 21:22

## 2020-11-15 RX ADMIN — Medication 20 MILLIGRAM(S): at 11:49

## 2020-11-15 RX ADMIN — Medication 3: at 16:51

## 2020-11-15 RX ADMIN — GABAPENTIN 100 MILLIGRAM(S): 400 CAPSULE ORAL at 14:32

## 2020-11-15 NOTE — PROGRESS NOTE ADULT - SUBJECTIVE AND OBJECTIVE BOX
Patient is a 65y old  Female who presents with a chief complaint of 01.1: Left Body Involvement (2020 15:10)      Patient examined and interviewed for follow up.    There were no major issues reported overnight and patient is without new complaints.    Nursing reports elevated accuchecks which are reviewed below.  REVIEW OF SYSTEMS:  CONSTITUTIONAL: No fever, weight loss, or fatigue  EYES: No eye pain, visual disturbances, or discharge  ENMT:  No difficulty hearing, tinnitus, vertigo; No sinus or throat pain  NECK: No pain or stiffness  BREASTS: No pain, masses, or nipple discharge  RESPIRATORY: No cough, wheezing, chills or hemoptysis; No shortness of breath  CARDIOVASCULAR: No chest pain, palpitations, dizziness, or leg swelling  GASTROINTESTINAL: No abdominal or epigastric pain. No nausea, vomiting, or hematemesis; No diarrhea or constipation. No melena or hematochezia.  GENITOURINARY: No dysuria, frequency, hematuria, or incontinence  NEUROLOGICAL: No headaches, memory loss, loss of strength, numbness, or tremors  SKIN: No itching, burning, rashes, or lesions   LYMPH NODES: No enlarged glands  ENDOCRINE: No heat or cold intolerance; No hair loss  MUSCULOSKELETAL: No joint pain or swelling; No muscle, back, or extremity pain  PSYCHIATRIC: No depression, anxiety, mood swings, or difficulty sleeping  HEME/LYMPH: No easy bruising, or bleeding gums  ALLERY AND IMMUNOLOGIC: No hives or eczema    ALL ROS REVIEWED AND NORMAL EXCEPT AS STATED ABOVE  MEDICATIONS  (STANDING):  aspirin  chewable 81 milliGRAM(s) Oral daily  atorvastatin 80 milliGRAM(s) Oral at bedtime  dextrose 40% Gel 15 Gram(s) Oral once  dextrose 5%. 1000 milliLiter(s) (50 mL/Hr) IV Continuous <Continuous>  dextrose 5%. 1000 milliLiter(s) (100 mL/Hr) IV Continuous <Continuous>  dextrose 50% Injectable 25 Gram(s) IV Push once  dextrose 50% Injectable 12.5 Gram(s) IV Push once  dextrose 50% Injectable 25 Gram(s) IV Push once  enoxaparin Injectable 40 milliGRAM(s) SubCutaneous daily  FLUoxetine 20 milliGRAM(s) Oral daily  gabapentin 100 milliGRAM(s) Oral three times a day  glucagon  Injectable 1 milliGRAM(s) IntraMuscular once  insulin glargine Injectable (LANTUS) 15 Unit(s) SubCutaneous at bedtime  insulin lispro (ADMELOG) corrective regimen sliding scale   SubCutaneous three times a day before meals  insulin lispro (ADMELOG) corrective regimen sliding scale   SubCutaneous at bedtime  insulin lispro Injectable (ADMELOG) 6 Unit(s) SubCutaneous three times a day before meals  lisinopril 5 milliGRAM(s) Oral daily  pantoprazole    Tablet 40 milliGRAM(s) Oral before breakfast  polyethylene glycol 3350 17 Gram(s) Oral two times a day  senna 2 Tablet(s) Oral at bedtime    MEDICATIONS  (PRN):    Daily     Daily Weight in k.6 (2020 22:30)  Vital Signs Last 24 Hrs  T(C): 36.7 (15 Nov 2020 07:40), Max: 36.8 (2020 20:02)  T(F): 98.1 (15 Nov 2020 07:40), Max: 98.2 (2020 20:02)  HR: 77 (15 Nov 2020 07:40) (69 - 77)  BP: 122/65 (15 Nov 2020 07:40) (122/65 - 151/76)  BP(mean): --  RR: 14 (15 Nov 2020 07:40) (14 - 14)  SpO2: 95% (15 Nov 2020 07:40) (95% - 99%)    CAPILLARY BLOOD GLUCOSE      POCT Blood Glucose.: 181 mg/dL (15 Nov 2020 07:50)  POCT Blood Glucose.: 238 mg/dL (2020 21:23)  POCT Blood Glucose.: 224 mg/dL (2020 17:12)  POCT Blood Glucose.: 190 mg/dL (2020 12:00)    PHYSICAL EXAM:  GENERAL: well-developed, well nourished in no acute distress  HEAD:  Atraumatic, Normocephalic  EYES: EOMI, PERRLA, conjunctiva and sclera clear  ENMT: No tonsillar erythema, exudates, or enlargement; Moist mucous membranes, Good dentition, No lesions  NECK: Supple, No JVD, No CHONG  CHEST/LUNG: Clear to percussion bilaterally; No rales, rhonchi, wheezing, or rubs  HEART: Regular rate and rhythm; No murmurs, rubs, or gallops  ABDOMEN: Soft, Nontender, Nondistended; Bowel sounds present  EXTREMITIES:  2+ Peripheral Pulses, No clubbing, cyanosis, or edema  LYMPH: No lymphadenopathy noted  SKIN: No rashes or lesions  NERVOUS SYSTEM:  Alert & Oriented X3, no new neurologic deficits ;     LABS:                COVID-19 PCR: NotDetec (2020 21:55)  COVID-19 PCR: NotDetec (2020 04:07)

## 2020-11-15 NOTE — PROGRESS NOTE ADULT - ASSESSMENT
Pt is a 66 y/o F with PMHx of T2DM, who presented to [facility] on 11/8/20 with acute onset of left sided weakness and slurred speech, found to have acute infarct of right lenticular nucleus/corona radiata region. Pt with functional deficits with ADLs, ambulation, and expression.    s/p acute right lenticular nucleus and corona radiata CVA  with left sided weakness : pt with ongoing but improving functional , ADL, gait and expressive deficits  - continue comprehensive acute rehabilitation program PT/OT/SLP  - continue aspirin 81mg  - continue Lipitor  - Prozac for motor recovery    Essential HTN: assessed as controlled  - continue Lisinopril 5mg daily    T2DM IR: assessed as not controlled at present  - will increase lantus to 18 units QHS  - continue Lispro 6u before meals  - ISS  - monitor fingersticks  - consistent carb diet  - A1C with Estimated Average Glucose Result: 7.9 % (11.09.20 @ 08:07)     Mood: assessed as stable  - continue Prozac  - neuropsych follow up PRN    Pain: assessed as controlled  - Tylenol PRN  - Gabapentin 100mg tid    GI ppx  - senna, miralax  - Protonix    VTE ppx  - continue Lovenox

## 2020-11-15 NOTE — PROGRESS NOTE ADULT - SUBJECTIVE AND OBJECTIVE BOX
Cc: Gait dysfunction    HPI: Patient with no new medical issues today.  +BM +void  Pain controlled, no chest pain, no N/V, no Fevers/Chills. No other new ROS  Has been tolerating rehabilitation program.    MEDICATIONS  (STANDING):  aspirin  chewable 81 milliGRAM(s) Oral daily  atorvastatin 80 milliGRAM(s) Oral at bedtime  dextrose 40% Gel 15 Gram(s) Oral once  dextrose 5%. 1000 milliLiter(s) (50 mL/Hr) IV Continuous <Continuous>  dextrose 5%. 1000 milliLiter(s) (100 mL/Hr) IV Continuous <Continuous>  dextrose 50% Injectable 25 Gram(s) IV Push once  dextrose 50% Injectable 12.5 Gram(s) IV Push once  dextrose 50% Injectable 25 Gram(s) IV Push once  enoxaparin Injectable 40 milliGRAM(s) SubCutaneous daily  FLUoxetine 20 milliGRAM(s) Oral daily  gabapentin 100 milliGRAM(s) Oral three times a day  glucagon  Injectable 1 milliGRAM(s) IntraMuscular once  insulin glargine Injectable (LANTUS) 18 Unit(s) SubCutaneous at bedtime  insulin lispro (ADMELOG) corrective regimen sliding scale   SubCutaneous three times a day before meals  insulin lispro (ADMELOG) corrective regimen sliding scale   SubCutaneous at bedtime  insulin lispro Injectable (ADMELOG) 6 Unit(s) SubCutaneous three times a day before meals  lisinopril 5 milliGRAM(s) Oral daily  pantoprazole    Tablet 40 milliGRAM(s) Oral before breakfast  polyethylene glycol 3350 17 Gram(s) Oral two times a day  senna 2 Tablet(s) Oral at bedtime    MEDICATIONS  (PRN):                  CAPILLARY BLOOD GLUCOSE      POCT Blood Glucose.: 206 mg/dL (15 Nov 2020 11:53)  POCT Blood Glucose.: 181 mg/dL (15 Nov 2020 07:50)  POCT Blood Glucose.: 238 mg/dL (14 Nov 2020 21:23)  POCT Blood Glucose.: 224 mg/dL (14 Nov 2020 17:12)      Vital Signs Last 24 Hrs  T(C): 36.7 (15 Nov 2020 07:40), Max: 36.8 (14 Nov 2020 20:02)  T(F): 98.1 (15 Nov 2020 07:40), Max: 98.2 (14 Nov 2020 20:02)  HR: 77 (15 Nov 2020 07:40) (69 - 77)  BP: 122/65 (15 Nov 2020 07:40) (122/65 - 151/76)  BP(mean): --  RR: 14 (15 Nov 2020 07:40) (14 - 14)  SpO2: 95% (15 Nov 2020 07:40) (95% - 99%)        In NAD  HEENT- EOMI  Heart- RRR, S1S2  Lungs- CTA bl.  Abd- + BS, NT  Ext- No calf pain  Neuro- Exam unchanged    RECENT LABS/IMAGING                        10.9   9.03  )-----------( 269      ( 13 Nov 2020 06:15 )             35.6     11-13    140  |  103  |  20  ----------------------------<  211<H>  4.4   |  31  |  1.06    Ca    9.0      13 Nov 2020 06:15    TPro  8.0  /  Alb  3.1<L>  /  TBili  0.3  /  DBili  x   /  AST  10  /  ALT  15  /  AlkPhos  87  11-13              Imp: Patient with diagnosis of  CVA Left charity  admitted for comprehensive acute rehabilitation.    Plan:  - Continue therapies  - DVT prophylaxis  - Skin- Turn q2h, check skin daily  - Continue current medications; patient medically stable.   - Patient is stable to continue current rehabilitation program.   -would increase insulin dose- already done by hospitalist

## 2020-11-16 LAB
ANION GAP SERPL CALC-SCNC: 8 MMOL/L — SIGNIFICANT CHANGE UP (ref 5–17)
BUN SERPL-MCNC: 20 MG/DL — SIGNIFICANT CHANGE UP (ref 7–23)
CALCIUM SERPL-MCNC: 9.7 MG/DL — SIGNIFICANT CHANGE UP (ref 8.4–10.5)
CHLORIDE SERPL-SCNC: 100 MMOL/L — SIGNIFICANT CHANGE UP (ref 96–108)
CO2 SERPL-SCNC: 29 MMOL/L — SIGNIFICANT CHANGE UP (ref 22–31)
CREAT SERPL-MCNC: 1.06 MG/DL — SIGNIFICANT CHANGE UP (ref 0.5–1.3)
GLUCOSE BLDC GLUCOMTR-MCNC: 162 MG/DL — HIGH (ref 70–99)
GLUCOSE BLDC GLUCOMTR-MCNC: 179 MG/DL — HIGH (ref 70–99)
GLUCOSE BLDC GLUCOMTR-MCNC: 211 MG/DL — HIGH (ref 70–99)
GLUCOSE BLDC GLUCOMTR-MCNC: 211 MG/DL — HIGH (ref 70–99)
GLUCOSE SERPL-MCNC: 197 MG/DL — HIGH (ref 70–99)
HCT VFR BLD CALC: 36.7 % — SIGNIFICANT CHANGE UP (ref 34.5–45)
HGB BLD-MCNC: 11 G/DL — LOW (ref 11.5–15.5)
MCHC RBC-ENTMCNC: 26 PG — LOW (ref 27–34)
MCHC RBC-ENTMCNC: 30 GM/DL — LOW (ref 32–36)
MCV RBC AUTO: 86.8 FL — SIGNIFICANT CHANGE UP (ref 80–100)
NRBC # BLD: 0 /100 WBCS — SIGNIFICANT CHANGE UP (ref 0–0)
PLATELET # BLD AUTO: 268 K/UL — SIGNIFICANT CHANGE UP (ref 150–400)
POTASSIUM SERPL-MCNC: 5.2 MMOL/L — SIGNIFICANT CHANGE UP (ref 3.5–5.3)
POTASSIUM SERPL-SCNC: 5.2 MMOL/L — SIGNIFICANT CHANGE UP (ref 3.5–5.3)
RBC # BLD: 4.23 M/UL — SIGNIFICANT CHANGE UP (ref 3.8–5.2)
RBC # FLD: 13.5 % — SIGNIFICANT CHANGE UP (ref 10.3–14.5)
SODIUM SERPL-SCNC: 137 MMOL/L — SIGNIFICANT CHANGE UP (ref 135–145)
WBC # BLD: 8.84 K/UL — SIGNIFICANT CHANGE UP (ref 3.8–10.5)
WBC # FLD AUTO: 8.84 K/UL — SIGNIFICANT CHANGE UP (ref 3.8–10.5)

## 2020-11-16 PROCEDURE — 99232 SBSQ HOSP IP/OBS MODERATE 35: CPT

## 2020-11-16 RX ORDER — LANOLIN ALCOHOL/MO/W.PET/CERES
6 CREAM (GRAM) TOPICAL AT BEDTIME
Refills: 0 | Status: DISCONTINUED | OUTPATIENT
Start: 2020-11-16 | End: 2020-11-25

## 2020-11-16 RX ADMIN — POLYETHYLENE GLYCOL 3350 17 GRAM(S): 17 POWDER, FOR SOLUTION ORAL at 16:53

## 2020-11-16 RX ADMIN — Medication 1: at 07:41

## 2020-11-16 RX ADMIN — Medication 20 MILLIGRAM(S): at 12:05

## 2020-11-16 RX ADMIN — ATORVASTATIN CALCIUM 80 MILLIGRAM(S): 80 TABLET, FILM COATED ORAL at 21:08

## 2020-11-16 RX ADMIN — Medication 6 UNIT(S): at 16:49

## 2020-11-16 RX ADMIN — LISINOPRIL 5 MILLIGRAM(S): 2.5 TABLET ORAL at 05:43

## 2020-11-16 RX ADMIN — GABAPENTIN 100 MILLIGRAM(S): 400 CAPSULE ORAL at 21:08

## 2020-11-16 RX ADMIN — Medication 6 MILLIGRAM(S): at 21:08

## 2020-11-16 RX ADMIN — POLYETHYLENE GLYCOL 3350 17 GRAM(S): 17 POWDER, FOR SOLUTION ORAL at 07:42

## 2020-11-16 RX ADMIN — GABAPENTIN 100 MILLIGRAM(S): 400 CAPSULE ORAL at 14:18

## 2020-11-16 RX ADMIN — Medication 81 MILLIGRAM(S): at 12:05

## 2020-11-16 RX ADMIN — Medication 6 UNIT(S): at 12:05

## 2020-11-16 RX ADMIN — GABAPENTIN 100 MILLIGRAM(S): 400 CAPSULE ORAL at 05:43

## 2020-11-16 RX ADMIN — ENOXAPARIN SODIUM 40 MILLIGRAM(S): 100 INJECTION SUBCUTANEOUS at 12:05

## 2020-11-16 RX ADMIN — Medication 2: at 16:49

## 2020-11-16 RX ADMIN — Medication 1: at 12:05

## 2020-11-16 RX ADMIN — Medication 6 UNIT(S): at 07:41

## 2020-11-16 RX ADMIN — INSULIN GLARGINE 18 UNIT(S): 100 INJECTION, SOLUTION SUBCUTANEOUS at 21:07

## 2020-11-16 RX ADMIN — PANTOPRAZOLE SODIUM 40 MILLIGRAM(S): 20 TABLET, DELAYED RELEASE ORAL at 05:43

## 2020-11-16 NOTE — PROGRESS NOTE ADULT - ASSESSMENT
66 y/o F with PMHx of T2DM, who presented to [facility] on 11/8/20 with acute onset of left sided weakness and slurred speech, found to have acute infarct of right lenticular nucleus/corona radiata region.     Right lenticular nucleus/corona radiata region infarct with left hemiplegia, left facial droop  - continue aspirin 81mg and - continue Lipitor  - Prozac for motor recovery  - PT/OT/SLP: total of 3 hrs/day 5 days/week       HTN:- continue Lisinopril 5mg daily, - Continue to monitor  BP controlled      T2DM: with hyperglycemia:  continue Lantus 15u at bedtime ( increased 11/13) and - continue Lispro 6u before meals  - ISS, - monitor fingersticks    Mood:- continue Prozac, - neuropsych as needed     Pain:-Continue Gabapentin 100mg tid  Start analgesics as needed     GI:- senna, miralax  - Protonix    :- bladder scan on admission and straight cath as necessary    Diet:- Dysphagia 3 Soft - Thin liquids  - Consistent carb, low sodium    DVT ppx:- Lovenox    Precautions: fall,   Positioning: lWC - lap tray or trough on left for support     Hospitalist following

## 2020-11-16 NOTE — PROGRESS NOTE ADULT - SUBJECTIVE AND OBJECTIVE BOX
HPI:  Pt is a 64 y/o F with PMHx of T2DM, who presented to [facility] on 11/8/20 with acute onset of left sided weakness and slurred speech. Pt was outside of window for tPA. On admission, her BP was 202/109. CT head showed chronic ischemic changes, no acute hemorrhage, or ischemic penumbra. Moderate stenosis of the ICA origin due to calcified and noncalcified atheromatous plaque. MRI showed acute infarct in the right lenticular nucleus/corona radiata region. She wsa evaluated by PM&R and PT and was recommended for acute rehab. She was deemed stable for discharge on 11/12/20 and was transferred to [facility] for acute inpatient rehabilitation.  (12 Nov 2020 15:51)      PAST MEDICAL & SURGICAL HISTORY:  GERD (gastroesophageal reflux disease)    DM (diabetes mellitus)    No significant past surgical history        Subjective:  No new complaints or overnight issues.  Pt. denies pain.  sleeping as per sleep log      VITALS  Vital Signs Last 24 Hrs  T(C): 36.8 (16 Nov 2020 07:38), Max: 36.8 (16 Nov 2020 07:38)  T(F): 98.2 (16 Nov 2020 07:38), Max: 98.2 (16 Nov 2020 07:38)  HR: 76 (16 Nov 2020 07:38) (76 - 76)  BP: 152/77 (16 Nov 2020 07:38) (130/70 - 152/77)  BP(mean): --  RR: 16 (16 Nov 2020 07:38) (16 - 18)  SpO2: 96% (16 Nov 2020 07:38) (96% - 96%)    REVIEW OF SYMPTOMS  Neurological deficits-- left HP    Physical exam  Constitutional - NAD, Comfortable  Chest - Clear  Cardiovascular - S1S2  Abdomen - BS+, Soft, NTND  Extremities - No C/C/E, No calf tenderness   Neurologic Exam - AAOX3 with some cues for date, Left facial droop, Left flaccid hemiplegia, Mild tone in LLE - at knee, 1/2 Finger breadth subluxation left shoulder . Sensory intact to LT and no extinction of DSS             Psychiatric - Mood stable, Affect WNL    RECENT LABS                        11.0   8.84  )-----------( 268      ( 16 Nov 2020 06:45 )             36.7     11-16    137  |  100  |  20  ----------------------------<  197<H>  5.2   |  29  |  1.06    Ca    9.7      16 Nov 2020 06:45              RADIOLOGY/OTHER RESULTS      MEDICATIONS  (STANDING):  aspirin  chewable 81 milliGRAM(s) Oral daily  atorvastatin 80 milliGRAM(s) Oral at bedtime  dextrose 40% Gel 15 Gram(s) Oral once  dextrose 5%. 1000 milliLiter(s) (50 mL/Hr) IV Continuous <Continuous>  dextrose 5%. 1000 milliLiter(s) (100 mL/Hr) IV Continuous <Continuous>  dextrose 50% Injectable 25 Gram(s) IV Push once  dextrose 50% Injectable 12.5 Gram(s) IV Push once  dextrose 50% Injectable 25 Gram(s) IV Push once  enoxaparin Injectable 40 milliGRAM(s) SubCutaneous daily  FLUoxetine 20 milliGRAM(s) Oral daily  gabapentin 100 milliGRAM(s) Oral three times a day  glucagon  Injectable 1 milliGRAM(s) IntraMuscular once  insulin glargine Injectable (LANTUS) 18 Unit(s) SubCutaneous at bedtime  insulin lispro (ADMELOG) corrective regimen sliding scale   SubCutaneous three times a day before meals  insulin lispro (ADMELOG) corrective regimen sliding scale   SubCutaneous at bedtime  insulin lispro Injectable (ADMELOG) 6 Unit(s) SubCutaneous three times a day before meals  lisinopril 5 milliGRAM(s) Oral daily  pantoprazole    Tablet 40 milliGRAM(s) Oral before breakfast  polyethylene glycol 3350 17 Gram(s) Oral two times a day  senna 2 Tablet(s) Oral at bedtime    MEDICATIONS  (PRN):

## 2020-11-17 LAB
GLUCOSE BLDC GLUCOMTR-MCNC: 206 MG/DL — HIGH (ref 70–99)
GLUCOSE BLDC GLUCOMTR-MCNC: 213 MG/DL — HIGH (ref 70–99)
GLUCOSE BLDC GLUCOMTR-MCNC: 216 MG/DL — HIGH (ref 70–99)
GLUCOSE BLDC GLUCOMTR-MCNC: 263 MG/DL — HIGH (ref 70–99)

## 2020-11-17 PROCEDURE — 99233 SBSQ HOSP IP/OBS HIGH 50: CPT

## 2020-11-17 PROCEDURE — 99232 SBSQ HOSP IP/OBS MODERATE 35: CPT

## 2020-11-17 RX ORDER — INSULIN GLARGINE 100 [IU]/ML
20 INJECTION, SOLUTION SUBCUTANEOUS AT BEDTIME
Refills: 0 | Status: DISCONTINUED | OUTPATIENT
Start: 2020-11-17 | End: 2020-11-19

## 2020-11-17 RX ORDER — ACETAMINOPHEN 500 MG
650 TABLET ORAL EVERY 6 HOURS
Refills: 0 | Status: DISCONTINUED | OUTPATIENT
Start: 2020-11-17 | End: 2020-11-25

## 2020-11-17 RX ADMIN — PANTOPRAZOLE SODIUM 40 MILLIGRAM(S): 20 TABLET, DELAYED RELEASE ORAL at 06:14

## 2020-11-17 RX ADMIN — INSULIN GLARGINE 20 UNIT(S): 100 INJECTION, SOLUTION SUBCUTANEOUS at 21:34

## 2020-11-17 RX ADMIN — Medication 6 MILLIGRAM(S): at 21:34

## 2020-11-17 RX ADMIN — Medication 6 UNIT(S): at 11:25

## 2020-11-17 RX ADMIN — Medication 20 MILLIGRAM(S): at 11:25

## 2020-11-17 RX ADMIN — GABAPENTIN 100 MILLIGRAM(S): 400 CAPSULE ORAL at 05:22

## 2020-11-17 RX ADMIN — GABAPENTIN 100 MILLIGRAM(S): 400 CAPSULE ORAL at 21:34

## 2020-11-17 RX ADMIN — LISINOPRIL 5 MILLIGRAM(S): 2.5 TABLET ORAL at 05:22

## 2020-11-17 RX ADMIN — ENOXAPARIN SODIUM 40 MILLIGRAM(S): 100 INJECTION SUBCUTANEOUS at 11:25

## 2020-11-17 RX ADMIN — Medication 6 UNIT(S): at 08:18

## 2020-11-17 RX ADMIN — GABAPENTIN 100 MILLIGRAM(S): 400 CAPSULE ORAL at 13:40

## 2020-11-17 RX ADMIN — Medication 2: at 08:18

## 2020-11-17 RX ADMIN — Medication 2: at 11:25

## 2020-11-17 RX ADMIN — Medication 81 MILLIGRAM(S): at 11:25

## 2020-11-17 RX ADMIN — Medication 6 UNIT(S): at 16:50

## 2020-11-17 RX ADMIN — ATORVASTATIN CALCIUM 80 MILLIGRAM(S): 80 TABLET, FILM COATED ORAL at 21:34

## 2020-11-17 RX ADMIN — Medication 3: at 16:49

## 2020-11-17 NOTE — PROGRESS NOTE ADULT - SUBJECTIVE AND OBJECTIVE BOX
HPI:  Pt is a 66 y/o F with PMHx of T2DM, who presented to [facility] on 11/8/20 with acute onset of left sided weakness and slurred speech. Pt was outside of window for tPA. On admission, her BP was 202/109. CT head showed chronic ischemic changes, no acute hemorrhage, or ischemic penumbra. Moderate stenosis of the ICA origin due to calcified and noncalcified atheromatous plaque. MRI showed acute infarct in the right lenticular nucleus/corona radiata region. She wsa evaluated by PM&R and PT and was recommended for acute rehab. She was deemed stable for discharge on 11/12/20 and was transferred to [facility] for acute inpatient rehabilitation.  (12 Nov 2020 15:51)      PAST MEDICAL & SURGICAL HISTORY:  GERD (gastroesophageal reflux disease)    DM (diabetes mellitus)    No significant past surgical history        Subjective:  Spoke with pt. via .  Mood better today.  Slept better last night.  Participating in therapy.       VITALS  Vital Signs Last 24 Hrs  T(C): 36.7 (17 Nov 2020 07:37), Max: 36.7 (16 Nov 2020 19:55)  T(F): 98 (17 Nov 2020 07:37), Max: 98.1 (16 Nov 2020 19:55)  HR: 72 (17 Nov 2020 07:37) (72 - 75)  BP: 124/67 (17 Nov 2020 07:37) (124/67 - 167/77)  BP(mean): --  RR: 15 (17 Nov 2020 07:37) (15 - 16)  SpO2: 97% (17 Nov 2020 07:37) (97% - 98%)    REVIEW OF SYMPTOMS  [X] Constitutional WNL     [X] Cardio WNL            [X] Resp WNL           [X] GI WNL                      [X]  WNL                 [X] Heme WNL              [X] Endo WNL                  [X] Skin WNL               [X] MSK WNL            [X] Neuro WNL                  [X] Cognitive WNL        [X] Psych WNL      PHYSICAL EXAM  Constitutional - NAD, Comfortable  HEENT - NCAT, EOMI  Chest - CTA bilaterally, No wheeze, No rhonchi, No crackles  Cardiovascular - RRR, S1S2, No murmurs  Abdomen - BS+, Soft, NTND  Extremities - No edema, No calf tenderness   Neurologic Exam -                    Cognitive - Awake, Alert, AAO to self, place, date, year, situation     Communication - Fluent, No dysarthria     Cranial Nerves - CN 2-12 intact     Motor - No focal deficits                    LEFT    UE - ShAB 5/5, EF 5/5, EE 5/5, WE 5/5,  5/5                    RIGHT UE - ShAB 5/5, EF 5/5, EE 5/5, WE 5/5,  5/5                    LEFT    LE - HF 5/5, KE 5/5, DF 5/5, PF 5/5                    RIGHT LE - HF 5/5, KE 5/5, DF 5/5, PF 5/5        Sensory - Intact to LT     Reflexes - DTR Intact, No primitive reflexive     Coordination - FTN intact  Psychiatric - Mood stable, Affect WNL  Skin -   Wounds -    FUNCTIONAL PROGRESS  Gait - EVAL  ADLs - EVAL   Transfers - EVAL  Functional transfer - EVAL    RECENT LABS                        11.0   8.84  )-----------( 268      ( 16 Nov 2020 06:45 )             36.7     11-16    137  |  100  |  20  ----------------------------<  197<H>  5.2   |  29  |  1.06    Ca    9.7      16 Nov 2020 06:45              RADIOLOGY/OTHER RESULTS      MEDICATIONS  (STANDING):  aspirin  chewable 81 milliGRAM(s) Oral daily  atorvastatin 80 milliGRAM(s) Oral at bedtime  dextrose 40% Gel 15 Gram(s) Oral once  dextrose 5%. 1000 milliLiter(s) (50 mL/Hr) IV Continuous <Continuous>  dextrose 5%. 1000 milliLiter(s) (100 mL/Hr) IV Continuous <Continuous>  dextrose 50% Injectable 25 Gram(s) IV Push once  dextrose 50% Injectable 12.5 Gram(s) IV Push once  dextrose 50% Injectable 25 Gram(s) IV Push once  enoxaparin Injectable 40 milliGRAM(s) SubCutaneous daily  FLUoxetine 20 milliGRAM(s) Oral daily  gabapentin 100 milliGRAM(s) Oral three times a day  glucagon  Injectable 1 milliGRAM(s) IntraMuscular once  insulin glargine Injectable (LANTUS) 20 Unit(s) SubCutaneous at bedtime  insulin lispro (ADMELOG) corrective regimen sliding scale   SubCutaneous three times a day before meals  insulin lispro (ADMELOG) corrective regimen sliding scale   SubCutaneous at bedtime  insulin lispro Injectable (ADMELOG) 6 Unit(s) SubCutaneous three times a day before meals  lisinopril 5 milliGRAM(s) Oral daily  melatonin 6 milliGRAM(s) Oral at bedtime  pantoprazole    Tablet 40 milliGRAM(s) Oral before breakfast  polyethylene glycol 3350 17 Gram(s) Oral two times a day  senna 2 Tablet(s) Oral at bedtime    MEDICATIONS  (PRN):         HPI:  Pt is a 64 y/o F with PMHx of T2DM, who presented to [facility] on 11/8/20 with acute onset of left sided weakness and slurred speech. Pt was outside of window for tPA. On admission, her BP was 202/109. CT head showed chronic ischemic changes, no acute hemorrhage, or ischemic penumbra. Moderate stenosis of the ICA origin due to calcified and noncalcified atheromatous plaque. MRI showed acute infarct in the right lenticular nucleus/corona radiata region. She wsa evaluated by PM&R and PT and was recommended for acute rehab. She was deemed stable for discharge on 11/12/20 and was transferred to [facility] for acute inpatient rehabilitation.  (12 Nov 2020 15:51)      PAST MEDICAL & SURGICAL HISTORY:  GERD (gastroesophageal reflux disease)    DM (diabetes mellitus)    No significant past surgical history        Subjective:  Spoke with pt. via .  Mood better today.  Slept better last night.  Participating in therapy.       VITALS  Vital Signs Last 24 Hrs  T(C): 36.7 (17 Nov 2020 07:37), Max: 36.7 (16 Nov 2020 19:55)  T(F): 98 (17 Nov 2020 07:37), Max: 98.1 (16 Nov 2020 19:55)  HR: 72 (17 Nov 2020 07:37) (72 - 75)  BP: 124/67 (17 Nov 2020 07:37) (124/67 - 167/77)  BP(mean): --  RR: 15 (17 Nov 2020 07:37) (15 - 16)  SpO2: 97% (17 Nov 2020 07:37) (97% - 98%)    REVIEW OF SYMPTOMS  Neurological deficits-- left HP    Physical exam  Constitutional - NAD, Comfortable  Chest - Clear  Cardiovascular - S1S2  Abdomen - BS+, Soft, NTND  Extremities - No C/C/E, No calf tenderness   Neurologic Exam - AAOX3 with some cues for date, Left facial droop, Left flaccid hemiplegia, Mild tone in LLE - at knee, 1/2 Finger breadth subluxation left shoulder . Sensory intact to LT and no extinction of DSS             Psychiatric - Mood stable, Affect WNL    RECENT LABS                        11.0   8.84  )-----------( 268      ( 16 Nov 2020 06:45 )             36.7     11-16    137  |  100  |  20  ----------------------------<  197<H>  5.2   |  29  |  1.06    Ca    9.7      16 Nov 2020 06:45              RADIOLOGY/OTHER RESULTS      MEDICATIONS  (STANDING):  aspirin  chewable 81 milliGRAM(s) Oral daily  atorvastatin 80 milliGRAM(s) Oral at bedtime  dextrose 40% Gel 15 Gram(s) Oral once  dextrose 5%. 1000 milliLiter(s) (50 mL/Hr) IV Continuous <Continuous>  dextrose 5%. 1000 milliLiter(s) (100 mL/Hr) IV Continuous <Continuous>  dextrose 50% Injectable 25 Gram(s) IV Push once  dextrose 50% Injectable 12.5 Gram(s) IV Push once  dextrose 50% Injectable 25 Gram(s) IV Push once  enoxaparin Injectable 40 milliGRAM(s) SubCutaneous daily  FLUoxetine 20 milliGRAM(s) Oral daily  gabapentin 100 milliGRAM(s) Oral three times a day  glucagon  Injectable 1 milliGRAM(s) IntraMuscular once  insulin glargine Injectable (LANTUS) 20 Unit(s) SubCutaneous at bedtime  insulin lispro (ADMELOG) corrective regimen sliding scale   SubCutaneous three times a day before meals  insulin lispro (ADMELOG) corrective regimen sliding scale   SubCutaneous at bedtime  insulin lispro Injectable (ADMELOG) 6 Unit(s) SubCutaneous three times a day before meals  lisinopril 5 milliGRAM(s) Oral daily  melatonin 6 milliGRAM(s) Oral at bedtime  pantoprazole    Tablet 40 milliGRAM(s) Oral before breakfast  polyethylene glycol 3350 17 Gram(s) Oral two times a day  senna 2 Tablet(s) Oral at bedtime    MEDICATIONS  (PRN):

## 2020-11-17 NOTE — PROGRESS NOTE ADULT - SUBJECTIVE AND OBJECTIVE BOX
HPI:  Pt is a 64 y/o F with PMHx of T2DM, who presented to [facility] on 11/8/20 with acute onset of left sided weakness and slurred speech. Pt was outside of window for tPA. On admission, her BP was 202/109. CT head showed chronic ischemic changes, no acute hemorrhage, or ischemic penumbra. Moderate stenosis of the ICA origin due to calcified and noncalcified atheromatous plaque. MRI showed acute infarct in the right lenticular nucleus/corona radiata region. She wsa evaluated by PM&R and PT and was recommended for acute rehab. She was deemed stable for discharge on 11/12/20 and was transferred to [facility] for acute inpatient rehabilitation.  (12 Nov 2020 15:51)      Subjective    No new complaints.       PAST MEDICAL & SURGICAL HISTORY:  GERD (gastroesophageal reflux disease)    DM (diabetes mellitus)    No significant past surgical history        MedsMEDICATIONS  (STANDING):  aspirin  chewable 81 milliGRAM(s) Oral daily  atorvastatin 80 milliGRAM(s) Oral at bedtime  dextrose 40% Gel 15 Gram(s) Oral once  dextrose 5%. 1000 milliLiter(s) (50 mL/Hr) IV Continuous <Continuous>  dextrose 5%. 1000 milliLiter(s) (100 mL/Hr) IV Continuous <Continuous>  dextrose 50% Injectable 25 Gram(s) IV Push once  dextrose 50% Injectable 12.5 Gram(s) IV Push once  dextrose 50% Injectable 25 Gram(s) IV Push once  enoxaparin Injectable 40 milliGRAM(s) SubCutaneous daily  FLUoxetine 20 milliGRAM(s) Oral daily  gabapentin 100 milliGRAM(s) Oral three times a day  glucagon  Injectable 1 milliGRAM(s) IntraMuscular once  insulin glargine Injectable (LANTUS) 18 Unit(s) SubCutaneous at bedtime  insulin lispro (ADMELOG) corrective regimen sliding scale   SubCutaneous at bedtime  insulin lispro (ADMELOG) corrective regimen sliding scale   SubCutaneous three times a day before meals  insulin lispro Injectable (ADMELOG) 6 Unit(s) SubCutaneous three times a day before meals  lisinopril 5 milliGRAM(s) Oral daily  melatonin 6 milliGRAM(s) Oral at bedtime  pantoprazole    Tablet 40 milliGRAM(s) Oral before breakfast  polyethylene glycol 3350 17 Gram(s) Oral two times a day  senna 2 Tablet(s) Oral at bedtime    MEDICATIONS  (PRN):      Vital Signs Last 24 Hrs  T(C): 36.7 (17 Nov 2020 07:37), Max: 36.7 (16 Nov 2020 19:55)  T(F): 98 (17 Nov 2020 07:37), Max: 98.1 (16 Nov 2020 19:55)  HR: 72 (17 Nov 2020 07:37) (72 - 75)  BP: 124/67 (17 Nov 2020 07:37) (124/67 - 167/77)  BP(mean): --  RR: 15 (17 Nov 2020 07:37) (15 - 16)  SpO2: 97% (17 Nov 2020 07:37) (97% - 98%)  I&O's Summary      PHYSICAL EXAM:  GENERAL: NAD  NECK: Supple  NERVOUS SYSTEM:  awake and alert  HEART: S1s2 NL , RRR  CHEST/LUNG: Clear to percussion bilaterally  ABDOMEN: Soft, Nontender, Nondistended; Bowel sounds present  EXTREMITIES:  No edema      LABS:(11-16 @ 06:45)                      11.0  8.84 )-----------( 268                 36.7    Neutrophils = -- (--%)  Lymphocytes = -- (--%)  Eosinophils = -- (--%)  Basophils = -- (--%)  Monocytes = -- (--%)  Bands = --%    11-16    137  |  100  |  20  ----------------------------<  197<H>  5.2   |  29  |  1.06    Ca    9.7      16 Nov 2020 06:45        CAPILLARY BLOOD GLUCOSE      POCT Blood Glucose.: 216 mg/dL (17 Nov 2020 11:23)  POCT Blood Glucose.: 206 mg/dL (17 Nov 2020 08:17)  POCT Blood Glucose.: 211 mg/dL (16 Nov 2020 21:06)  POCT Blood Glucose.: 211 mg/dL (16 Nov 2020 16:47)      Imaging Personally Reviewed:  [ ] YES  [ ] NO        Care Discussed with Consultants/Other Providers [ x] YES  [ ] NO    CVA  PT/OT per rehab  ASA/Statin    HTN  lisinopril    DM2, a1c 7.9%  Inc Lantus to 15  Lispro premeal/SS    DVT ppx  Lovenox     HPI:  Pt is a 64 y/o F with PMHx of T2DM, who presented to [facility] on 11/8/20 with acute onset of left sided weakness and slurred speech. Pt was outside of window for tPA. On admission, her BP was 202/109. CT head showed chronic ischemic changes, no acute hemorrhage, or ischemic penumbra. Moderate stenosis of the ICA origin due to calcified and noncalcified atheromatous plaque. MRI showed acute infarct in the right lenticular nucleus/corona radiata region. She wsa evaluated by PM&R and PT and was recommended for acute rehab. She was deemed stable for discharge on 11/12/20 and was transferred to [facility] for acute inpatient rehabilitation.  (12 Nov 2020 15:51)      Subjective    No new complaints.       PAST MEDICAL & SURGICAL HISTORY:  GERD (gastroesophageal reflux disease)    DM (diabetes mellitus)    No significant past surgical history        MedsMEDICATIONS  (STANDING):  aspirin  chewable 81 milliGRAM(s) Oral daily  atorvastatin 80 milliGRAM(s) Oral at bedtime  dextrose 40% Gel 15 Gram(s) Oral once  dextrose 5%. 1000 milliLiter(s) (50 mL/Hr) IV Continuous <Continuous>  dextrose 5%. 1000 milliLiter(s) (100 mL/Hr) IV Continuous <Continuous>  dextrose 50% Injectable 25 Gram(s) IV Push once  dextrose 50% Injectable 12.5 Gram(s) IV Push once  dextrose 50% Injectable 25 Gram(s) IV Push once  enoxaparin Injectable 40 milliGRAM(s) SubCutaneous daily  FLUoxetine 20 milliGRAM(s) Oral daily  gabapentin 100 milliGRAM(s) Oral three times a day  glucagon  Injectable 1 milliGRAM(s) IntraMuscular once  insulin glargine Injectable (LANTUS) 18 Unit(s) SubCutaneous at bedtime  insulin lispro (ADMELOG) corrective regimen sliding scale   SubCutaneous at bedtime  insulin lispro (ADMELOG) corrective regimen sliding scale   SubCutaneous three times a day before meals  insulin lispro Injectable (ADMELOG) 6 Unit(s) SubCutaneous three times a day before meals  lisinopril 5 milliGRAM(s) Oral daily  melatonin 6 milliGRAM(s) Oral at bedtime  pantoprazole    Tablet 40 milliGRAM(s) Oral before breakfast  polyethylene glycol 3350 17 Gram(s) Oral two times a day  senna 2 Tablet(s) Oral at bedtime    MEDICATIONS  (PRN):      Vital Signs Last 24 Hrs  T(C): 36.7 (17 Nov 2020 07:37), Max: 36.7 (16 Nov 2020 19:55)  T(F): 98 (17 Nov 2020 07:37), Max: 98.1 (16 Nov 2020 19:55)  HR: 72 (17 Nov 2020 07:37) (72 - 75)  BP: 124/67 (17 Nov 2020 07:37) (124/67 - 167/77)  BP(mean): --  RR: 15 (17 Nov 2020 07:37) (15 - 16)  SpO2: 97% (17 Nov 2020 07:37) (97% - 98%)  I&O's Summary      PHYSICAL EXAM:  GENERAL: NAD  NECK: Supple  NERVOUS SYSTEM:  awake and alert  HEART: S1s2 NL , RRR  CHEST/LUNG: Clear to percussion bilaterally  ABDOMEN: Soft, Nontender, Nondistended; Bowel sounds present  EXTREMITIES:  No edema      LABS:(11-16 @ 06:45)                      11.0  8.84 )-----------( 268                 36.7    Neutrophils = -- (--%)  Lymphocytes = -- (--%)  Eosinophils = -- (--%)  Basophils = -- (--%)  Monocytes = -- (--%)  Bands = --%    11-16    137  |  100  |  20  ----------------------------<  197<H>  5.2   |  29  |  1.06    Ca    9.7      16 Nov 2020 06:45        CAPILLARY BLOOD GLUCOSE      POCT Blood Glucose.: 216 mg/dL (17 Nov 2020 11:23)  POCT Blood Glucose.: 206 mg/dL (17 Nov 2020 08:17)  POCT Blood Glucose.: 211 mg/dL (16 Nov 2020 21:06)  POCT Blood Glucose.: 211 mg/dL (16 Nov 2020 16:47)      Imaging Personally Reviewed:  [ ] YES  [ ] NO        Care Discussed with Consultants/Other Providers [ x] YES  [ ] NO    CVA  PT/OT per rehab  ASA/Statin    HTN  lisinopril    DM2, a1c 7.9%  Inc Lantus to 20  Lispro premeal/SS    DVT ppx  Lovenox

## 2020-11-17 NOTE — PROGRESS NOTE ADULT - ASSESSMENT
66 y/o F with PMHx of T2DM, who presented to [facility] on 11/8/20 with acute onset of left sided weakness and slurred speech, found to have acute infarct of right lenticular nucleus/corona radiata region.     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     Pain:-Continue Gabapentin 100mg tid  Tylenol PRN    GI:- senna, miralax  - Protonix    :-voiding, PVR =0    Diet:- Dysphagia 3 Soft - Thin liquids  - Consistent carb, low sodium    DVT ppx:- Lovenox    Precautions: fall,   Positioning: lWC - lap tray or trough on left for support     Hospitalist following    Team meeting 11/17--  [facility] B/B  SW: lives in  with spouse, daughter, and son in law.  independent PTA  OT--min A eat/groom; Mod A UBD, toileting; Tot. A shower transfer.    PT-- Mod A transf, Amb. 25 Ft HR,  Speech-- Soft diet/thins, Mild high level cognitive deficits, Left inattention improved.  Vital stim.  d/c plan for 12/5    **Spoke with pt's daughter at bedside regarding pt's diagnosis, deficits, progress in therapy, rehab plan of care and ELOS of 3 weeks.  Family prefers pt. discharge earlier as pt. has been emotional regarding wanted to return to [facility].  She has 10 children to assist her.  discussed barriers to safe transfer.  Offered pt's daughter and Son-in-law to come in for family training to observe pt's needs -- she would like to schedule this week.  Will coordinate with SW.  All questions answered.

## 2020-11-18 LAB
APPEARANCE UR: CLEAR — SIGNIFICANT CHANGE UP
BACTERIA # UR AUTO: ABNORMAL /HPF
BILIRUB UR-MCNC: NEGATIVE — SIGNIFICANT CHANGE UP
COLOR SPEC: YELLOW — SIGNIFICANT CHANGE UP
DIFF PNL FLD: NEGATIVE — SIGNIFICANT CHANGE UP
EPI CELLS # UR: SIGNIFICANT CHANGE UP
GLUCOSE BLDC GLUCOMTR-MCNC: 131 MG/DL — HIGH (ref 70–99)
GLUCOSE BLDC GLUCOMTR-MCNC: 197 MG/DL — HIGH (ref 70–99)
GLUCOSE BLDC GLUCOMTR-MCNC: 221 MG/DL — HIGH (ref 70–99)
GLUCOSE BLDC GLUCOMTR-MCNC: 255 MG/DL — HIGH (ref 70–99)
GLUCOSE UR QL: 100 MG/DL
KETONES UR-MCNC: NEGATIVE — SIGNIFICANT CHANGE UP
LEUKOCYTE ESTERASE UR-ACNC: ABNORMAL
NITRITE UR-MCNC: NEGATIVE — SIGNIFICANT CHANGE UP
PH UR: 6.5 — SIGNIFICANT CHANGE UP (ref 5–8)
PROT UR-MCNC: 100
RBC CASTS # UR COMP ASSIST: SIGNIFICANT CHANGE UP /HPF (ref 0–4)
SP GR SPEC: 1.01 — SIGNIFICANT CHANGE UP (ref 1.01–1.02)
UROBILINOGEN FLD QL: NEGATIVE — SIGNIFICANT CHANGE UP
WBC UR QL: SIGNIFICANT CHANGE UP /HPF (ref 0–5)

## 2020-11-18 PROCEDURE — 99232 SBSQ HOSP IP/OBS MODERATE 35: CPT

## 2020-11-18 PROCEDURE — 96116 NUBHVL XM PHYS/QHP 1ST HR: CPT

## 2020-11-18 RX ADMIN — Medication 6 MILLIGRAM(S): at 21:21

## 2020-11-18 RX ADMIN — SENNA PLUS 2 TABLET(S): 8.6 TABLET ORAL at 21:21

## 2020-11-18 RX ADMIN — GABAPENTIN 100 MILLIGRAM(S): 400 CAPSULE ORAL at 06:15

## 2020-11-18 RX ADMIN — Medication 6 UNIT(S): at 12:21

## 2020-11-18 RX ADMIN — Medication 1: at 12:20

## 2020-11-18 RX ADMIN — PANTOPRAZOLE SODIUM 40 MILLIGRAM(S): 20 TABLET, DELAYED RELEASE ORAL at 06:15

## 2020-11-18 RX ADMIN — ENOXAPARIN SODIUM 40 MILLIGRAM(S): 100 INJECTION SUBCUTANEOUS at 12:20

## 2020-11-18 RX ADMIN — Medication 3: at 08:08

## 2020-11-18 RX ADMIN — GABAPENTIN 100 MILLIGRAM(S): 400 CAPSULE ORAL at 21:21

## 2020-11-18 RX ADMIN — Medication 20 MILLIGRAM(S): at 12:20

## 2020-11-18 RX ADMIN — INSULIN GLARGINE 20 UNIT(S): 100 INJECTION, SOLUTION SUBCUTANEOUS at 21:20

## 2020-11-18 RX ADMIN — Medication 81 MILLIGRAM(S): at 12:20

## 2020-11-18 RX ADMIN — Medication 6 UNIT(S): at 08:09

## 2020-11-18 RX ADMIN — LISINOPRIL 5 MILLIGRAM(S): 2.5 TABLET ORAL at 06:15

## 2020-11-18 RX ADMIN — Medication 2: at 17:09

## 2020-11-18 RX ADMIN — ATORVASTATIN CALCIUM 80 MILLIGRAM(S): 80 TABLET, FILM COATED ORAL at 21:21

## 2020-11-18 RX ADMIN — Medication 6 UNIT(S): at 17:08

## 2020-11-18 RX ADMIN — GABAPENTIN 100 MILLIGRAM(S): 400 CAPSULE ORAL at 14:59

## 2020-11-18 NOTE — PROGRESS NOTE ADULT - ASSESSMENT
64 y/o F with PMHx of T2DM, who presented to [facility] on 11/8/20 with acute onset of left sided weakness and slurred speech, found to have acute infarct of right lenticular nucleus/corona radiata region.     Right lenticular nucleus/corona radiata region infarct with left hemiplegia, left facial droop  - continue aspirin 81mg and - continue Lipitor  - Prozac for motor recovery  - PT/OT/SLP: total of 3 hrs/day 5 days/week     HTN:- continue Lisinopril 5mg daily, - Continue to monitor  BP controlled      T2DM: with hyperglycemia:    -- Lantus 20 unit at bedtime ( increased 11/17)    - continue Lispro 6u before meals  - ISS, - monitor fingersticks  --Spoke with nutritionist regarding education of patient and family on diabetic diet    Mood:- continue Prozac, -   --neuropsych consult for mood, coping.     Pain:-Continue Gabapentin 100mg tid  Tylenol PRN    GI:- senna, miralax  - Protonix    :-voiding, PVR =0  --u/a ordered    Diet:- Dysphagia 3 Soft - Thin liquids  - Consistent carb, low sodium    DVT ppx:- Lovenox    Precautions: fall,   Positioning: lWC - lap tray or trough on left for support     Hospitalist following    Team meeting 11/17--  [place] B/B  SW: lives in  with spouse, daughter, and son in law.  independent PTA  OT--min A eat/groom; Mod A UBD, toileting; Tot. A shower transfer.    PT-- Mod A transf, Amb. 25 Ft HR,  Speech-- Soft diet/thins, Mild high level cognitive deficits, Left inattention improved.  Vital stim.  d/c plan for 12/5

## 2020-11-18 NOTE — PROGRESS NOTE ADULT - SUBJECTIVE AND OBJECTIVE BOX
HPI:  Pt is a 64 y/o F with PMHx of T2DM, who presented to [facility] on 11/8/20 with acute onset of left sided weakness and slurred speech. Pt was outside of window for tPA. On admission, her BP was 202/109. CT head showed chronic ischemic changes, no acute hemorrhage, or ischemic penumbra. Moderate stenosis of the ICA origin due to calcified and noncalcified atheromatous plaque. MRI showed acute infarct in the right lenticular nucleus/corona radiata region. She wsa evaluated by PM&R and PT and was recommended for acute rehab. She was deemed stable for discharge on 11/12/20 and was transferred to [facility] for acute inpatient rehabilitation.  (12 Nov 2020 15:51)      PAST MEDICAL & SURGICAL HISTORY:  GERD (gastroesophageal reflux disease)    DM (diabetes mellitus)    No significant past surgical history        Subjective:  Nursing reports pt. with foul smelling urine.  Pt. has been hydrating.  Participating in therapy.  Nursing also notes that pt.'s FS in 200s and daughter brings her a lot of fruit which pt. snacks on throughout the day      VITALS  Vital Signs Last 24 Hrs  T(C): 36.7 (18 Nov 2020 07:59), Max: 36.7 (18 Nov 2020 07:59)  T(F): 98.1 (18 Nov 2020 07:59), Max: 98.1 (18 Nov 2020 07:59)  HR: 78 (18 Nov 2020 07:59) (70 - 78)  BP: 127/71 (18 Nov 2020 07:59) (127/71 - 145/80)  BP(mean): --  RR: 14 (18 Nov 2020 07:59) (14 - 15)  SpO2: 96% (18 Nov 2020 07:59) (96% - 100%)    REVIEW OF SYMPTOMS  Neurological deficits-- left HP    Physical exam  Constitutional - NAD, Comfortable  Chest - CTA  Cardiovascular - S1S2  Abdomen - BS+, Soft, NTND  Extremities - No C/C/E, No calf tenderness   Neurologic Exam - AAOX3 with some cues for date, Left facial droop, Left hemiplegia, left LE 1/5 in hip, knee and PF.  0/5 in DF and left UE  Sensory intact to LT   MSK--1/2 Finger breadth subluxation left shoulder .    Psychiatric - Mood stable, Affect WNL    RECENT LABS                  RADIOLOGY/OTHER RESULTS      MEDICATIONS  (STANDING):  aspirin  chewable 81 milliGRAM(s) Oral daily  atorvastatin 80 milliGRAM(s) Oral at bedtime  dextrose 40% Gel 15 Gram(s) Oral once  dextrose 5%. 1000 milliLiter(s) (50 mL/Hr) IV Continuous <Continuous>  dextrose 5%. 1000 milliLiter(s) (100 mL/Hr) IV Continuous <Continuous>  dextrose 50% Injectable 25 Gram(s) IV Push once  dextrose 50% Injectable 12.5 Gram(s) IV Push once  dextrose 50% Injectable 25 Gram(s) IV Push once  enoxaparin Injectable 40 milliGRAM(s) SubCutaneous daily  FLUoxetine 20 milliGRAM(s) Oral daily  gabapentin 100 milliGRAM(s) Oral three times a day  glucagon  Injectable 1 milliGRAM(s) IntraMuscular once  insulin glargine Injectable (LANTUS) 20 Unit(s) SubCutaneous at bedtime  insulin lispro (ADMELOG) corrective regimen sliding scale   SubCutaneous three times a day before meals  insulin lispro (ADMELOG) corrective regimen sliding scale   SubCutaneous at bedtime  insulin lispro Injectable (ADMELOG) 6 Unit(s) SubCutaneous three times a day before meals  lisinopril 5 milliGRAM(s) Oral daily  melatonin 6 milliGRAM(s) Oral at bedtime  pantoprazole    Tablet 40 milliGRAM(s) Oral before breakfast  polyethylene glycol 3350 17 Gram(s) Oral two times a day  senna 2 Tablet(s) Oral at bedtime    MEDICATIONS  (PRN):  acetaminophen   Tablet .. 650 milliGRAM(s) Oral every 6 hours PRN Mild Pain (1 - 3), Moderate Pain (4 - 6), Severe Pain (7 - 10)

## 2020-11-18 NOTE — CONSULT NOTE ADULT - ASSESSMENT
Assessment: Pt presents with mild to moderate cognitive deficits (mild vs. major neurocognitive disorder due to CVA). Pt exhibited difficulties in concentration, working memory for calculations, short-term recall of verbal information (improving modestly with cueing), visuospatial skills (poor visuomotor integration and left sided visual inattention), as well as aspects of executive functions (organizational skills, problem solving). Her affect is depressed and tearful, and she reported several depressive symptoms in response to her current adjustment ot her disabilities including sad mood, crying spells, anxiety, worry, decreased range of activities and interest, boredom, isolation, helplessness/hopelessness,  poor sleep, decreased appetite, and low energy. FIM scores: Social Interaction 4; Problem Solving 4; Memory 4.  Plan: Individual supportive psychotherapy to monitor cognition, affect/mood, and behavior. Continue with current antidepressant medication (fluoxetine 20 mg QD). Cognitive remediation during speech therapy sessions is strongly recommended. Participation in recreation/art therapy in order to have pleasure and mastery experiences and regain/reestablish a sense of routine.

## 2020-11-18 NOTE — CONSULT NOTE ADULT - SUBJECTIVE AND OBJECTIVE BOX
Pt is a 66 y/o right-handed female with PMHx of T2DM, who presented to [facility] on 11/8/20 with acute onset of left sided weakness and slurred speech. Pt was outside of window for tPA. On admission, her BP was 202/109. CT head showed chronic ischemic changes, no acute hemorrhage, or ischemic penumbra. Moderate stenosis of the ICA origin due to calcified and noncalcified atheromatous plaque. MRI showed acute infarct in the right lenticular nucleus/corona radiata region. She was evaluated by PM&R and PT and was recommended for acute rehab. She was deemed stable for discharge on 11/12/20 and was transferred to [facility] for acute inpatient rehabilitation. PMHx: As noted above. Current meds: Fluoxetine 20 mg QD. Please see list in Pt’s chart. Social Hx: Pt is  and has 10 children, 7 of them living in [facility]. She completed 7th grade in [place] [facility]; she is a housewife. Pt denies hx of mental illness and substance abuse. She is [employer]. Pt enjoys praying.   Findings: Pt was seen for an initial assessment of her cognitive and emotional functioning. MMSE was administered; Pt’s results (20/30) were in the Mildly Impaired range. Her scores in a geriatric mood measure suggested mild levels of depression (GDS = 5/15). Pt was alert, partly Ox3 (disoriented to season, floor, city and county), and cooperative. Attn/Conc: Simple auditory attention - intact.  Concentration - impaired. Working memory for calculations – mildly impaired (3/5 serial 7s).  Language: Pt’s speech was of  normal volume and pace, and mildly slurred. Naming - intact. Sentence repetition - intact. Auditory Comprehension - intact. Reading - intact. Writing - intact. Memory: Encoding of 3 words was intact (3/3); short-delayed verbal recall – significantly impaired (0/3, improving to 3/3 with cueing); long-delayed verbal recall – moderately impaired (1/3, improving to 3/3 with cueing). LTM was moderately impaired for [place] presidents (2/4, improving to 4/4 with cueing). Visual memory – impaired. Visuospatial: Visuomotor integration – impaired for copy of a 2D figure, significant distortion and simplification were noted. Executive Functions: Motor Planning - intact. Organizational skills - moderately impaired. Abstract reasoning - not assessed. Verbal problem solving – moderately impaired. Emotional functioning: Affect - depressed, tearful. Mood - euthymic ("not bad"); Pt reported experiencing sadness, crying spells, anxiety, worry, poor sleep, decreased appetite, and low energy. On a mood measures she additionally reported decreased range of activities and interests, boredom, helplessness, memory loss and hopelessness.  Thought processes were goal-directed.  No abnormal thought contents were observed.  Pt denied any AH/VH. Pt also denied SI/HI/I/P. Insight - fair. Judgment - poor to fair.

## 2020-11-18 NOTE — CHART NOTE - NSCHARTNOTEFT_GEN_A_CORE
NUTRITION FOLLOW UP    SOURCE: Patient [X)   Family [x]    Medical Record (X)    Diet, Dysphagia 3 Soft-Thin Liquids:   Consistent Carbohydrate {Evening Snack}  Low Sodium (11-12-20 @ 16:23) [Active]      Per interdisciplinary rounds, patient's daughter brings in high carbohydrate foods/snacks for patient and has been influencing blood sugars. Writer met with patient and spoke with daughter on the phone educating in more appropriate things to bring in, if desired, in order to better control blood sugars. Noted on the bedside was a fruit tray and cranberry juice cocktail. Explained what foods had carbohydrates and the benefit of low-carb snacks between meals. Verbalized understanding. informational materials/handouts provided and left at bedside.    PO INTAKE: ~75% of meals consumed.     CURRENT WEIGHT: 182#   PERTINENT MEDS:   Pertinent Medications: MEDICATIONS  (STANDING):  aspirin  chewable 81 milliGRAM(s) Oral daily  atorvastatin 80 milliGRAM(s) Oral at bedtime  dextrose 40% Gel 15 Gram(s) Oral once  dextrose 5%. 1000 milliLiter(s) (50 mL/Hr) IV Continuous <Continuous>  dextrose 5%. 1000 milliLiter(s) (100 mL/Hr) IV Continuous <Continuous>  dextrose 50% Injectable 25 Gram(s) IV Push once  dextrose 50% Injectable 12.5 Gram(s) IV Push once  dextrose 50% Injectable 25 Gram(s) IV Push once  enoxaparin Injectable 40 milliGRAM(s) SubCutaneous daily  FLUoxetine 20 milliGRAM(s) Oral daily  gabapentin 100 milliGRAM(s) Oral three times a day  glucagon  Injectable 1 milliGRAM(s) IntraMuscular once  insulin glargine Injectable (LANTUS) 20 Unit(s) SubCutaneous at bedtime  insulin lispro (ADMELOG) corrective regimen sliding scale   SubCutaneous three times a day before meals  insulin lispro (ADMELOG) corrective regimen sliding scale   SubCutaneous at bedtime  insulin lispro Injectable (ADMELOG) 6 Unit(s) SubCutaneous three times a day before meals  lisinopril 5 milliGRAM(s) Oral daily  melatonin 6 milliGRAM(s) Oral at bedtime  pantoprazole    Tablet 40 milliGRAM(s) Oral before breakfast  polyethylene glycol 3350 17 Gram(s) Oral two times a day  senna 2 Tablet(s) Oral at bedtime    MEDICATIONS  (PRN):  acetaminophen   Tablet .. 650 milliGRAM(s) Oral every 6 hours PRN Mild Pain (1 - 3), Moderate Pain (4 - 6), Severe Pain (7 - 10)      PERTINENT LABS:  11-16 Na137 mmol/L Glu 197 mg/dL<H> K+ 5.2 mmol/L Cr  1.06 mg/dL BUN 20 mg/dL 11-13 Alb 3.1 g/dL<L> 11-08 Chol 202 mg/dL<H> LDL --    HDL 39 mg/dL<L> Trig 300 mg/dL<H>  11-09-20 @ 08:07 A1C 7.9  11-08-20 @ 06:25 A1C 7.9      SKIN:  intact  EDEMA: none noted  LAST BM: 11/17    ESTIMATED NEEDS:   [X] no change since previous assessment  [ ] recalculated:     PREVIOUS NUTRITION DIAGNOSIS:  no nutrition diagnosis    NEW NUTRITION DIAGNOSIS: Limited adherence to nutrition-related recommendations.     NUTRITION RECOMMENDATIONS:   1. Continue diet as ordered.   2. Diet education provided to patient and daughter.   3. Monitor weights, skin, labs, appetite.     MONITORING AND EVALUATION:   1. Tolerance to diet prescription   2. PO intake  3. Weights  4. Labs  5. Follow Up per protocol     RD to remain available  Danna Samaniego RDN #[id number]

## 2020-11-19 LAB
ANION GAP SERPL CALC-SCNC: 6 MMOL/L — SIGNIFICANT CHANGE UP (ref 5–17)
BUN SERPL-MCNC: 18 MG/DL — SIGNIFICANT CHANGE UP (ref 7–23)
CALCIUM SERPL-MCNC: 9.4 MG/DL — SIGNIFICANT CHANGE UP (ref 8.4–10.5)
CHLORIDE SERPL-SCNC: 99 MMOL/L — SIGNIFICANT CHANGE UP (ref 96–108)
CO2 SERPL-SCNC: 30 MMOL/L — SIGNIFICANT CHANGE UP (ref 22–31)
CREAT SERPL-MCNC: 1.14 MG/DL — SIGNIFICANT CHANGE UP (ref 0.5–1.3)
GLUCOSE BLDC GLUCOMTR-MCNC: 176 MG/DL — HIGH (ref 70–99)
GLUCOSE BLDC GLUCOMTR-MCNC: 195 MG/DL — HIGH (ref 70–99)
GLUCOSE BLDC GLUCOMTR-MCNC: 196 MG/DL — HIGH (ref 70–99)
GLUCOSE BLDC GLUCOMTR-MCNC: 281 MG/DL — HIGH (ref 70–99)
GLUCOSE SERPL-MCNC: 197 MG/DL — HIGH (ref 70–99)
HCT VFR BLD CALC: 35.5 % — SIGNIFICANT CHANGE UP (ref 34.5–45)
HGB BLD-MCNC: 10.7 G/DL — LOW (ref 11.5–15.5)
MCHC RBC-ENTMCNC: 25.8 PG — LOW (ref 27–34)
MCHC RBC-ENTMCNC: 30.1 GM/DL — LOW (ref 32–36)
MCV RBC AUTO: 85.7 FL — SIGNIFICANT CHANGE UP (ref 80–100)
NRBC # BLD: 0 /100 WBCS — SIGNIFICANT CHANGE UP (ref 0–0)
PLATELET # BLD AUTO: 283 K/UL — SIGNIFICANT CHANGE UP (ref 150–400)
POTASSIUM SERPL-MCNC: 4.8 MMOL/L — SIGNIFICANT CHANGE UP (ref 3.5–5.3)
POTASSIUM SERPL-SCNC: 4.8 MMOL/L — SIGNIFICANT CHANGE UP (ref 3.5–5.3)
RBC # BLD: 4.14 M/UL — SIGNIFICANT CHANGE UP (ref 3.8–5.2)
RBC # FLD: 13.3 % — SIGNIFICANT CHANGE UP (ref 10.3–14.5)
SODIUM SERPL-SCNC: 135 MMOL/L — SIGNIFICANT CHANGE UP (ref 135–145)
WBC # BLD: 8.68 K/UL — SIGNIFICANT CHANGE UP (ref 3.8–10.5)
WBC # FLD AUTO: 8.68 K/UL — SIGNIFICANT CHANGE UP (ref 3.8–10.5)

## 2020-11-19 PROCEDURE — 99232 SBSQ HOSP IP/OBS MODERATE 35: CPT

## 2020-11-19 RX ORDER — INSULIN GLARGINE 100 [IU]/ML
23 INJECTION, SOLUTION SUBCUTANEOUS AT BEDTIME
Refills: 0 | Status: DISCONTINUED | OUTPATIENT
Start: 2020-11-19 | End: 2020-11-21

## 2020-11-19 RX ADMIN — GABAPENTIN 100 MILLIGRAM(S): 400 CAPSULE ORAL at 21:56

## 2020-11-19 RX ADMIN — GABAPENTIN 100 MILLIGRAM(S): 400 CAPSULE ORAL at 05:27

## 2020-11-19 RX ADMIN — Medication 20 MILLIGRAM(S): at 12:03

## 2020-11-19 RX ADMIN — INSULIN GLARGINE 23 UNIT(S): 100 INJECTION, SOLUTION SUBCUTANEOUS at 21:56

## 2020-11-19 RX ADMIN — Medication 3: at 17:08

## 2020-11-19 RX ADMIN — SENNA PLUS 2 TABLET(S): 8.6 TABLET ORAL at 21:56

## 2020-11-19 RX ADMIN — PANTOPRAZOLE SODIUM 40 MILLIGRAM(S): 20 TABLET, DELAYED RELEASE ORAL at 05:27

## 2020-11-19 RX ADMIN — Medication 6 MILLIGRAM(S): at 21:57

## 2020-11-19 RX ADMIN — Medication 6 UNIT(S): at 12:06

## 2020-11-19 RX ADMIN — POLYETHYLENE GLYCOL 3350 17 GRAM(S): 17 POWDER, FOR SOLUTION ORAL at 17:09

## 2020-11-19 RX ADMIN — Medication 1: at 12:06

## 2020-11-19 RX ADMIN — Medication 81 MILLIGRAM(S): at 12:03

## 2020-11-19 RX ADMIN — LISINOPRIL 5 MILLIGRAM(S): 2.5 TABLET ORAL at 05:27

## 2020-11-19 RX ADMIN — Medication 1: at 08:31

## 2020-11-19 RX ADMIN — ENOXAPARIN SODIUM 40 MILLIGRAM(S): 100 INJECTION SUBCUTANEOUS at 12:03

## 2020-11-19 RX ADMIN — GABAPENTIN 100 MILLIGRAM(S): 400 CAPSULE ORAL at 15:11

## 2020-11-19 RX ADMIN — ATORVASTATIN CALCIUM 80 MILLIGRAM(S): 80 TABLET, FILM COATED ORAL at 21:56

## 2020-11-19 RX ADMIN — Medication 6 UNIT(S): at 08:30

## 2020-11-19 RX ADMIN — Medication 6 UNIT(S): at 17:08

## 2020-11-19 NOTE — PROGRESS NOTE ADULT - SUBJECTIVE AND OBJECTIVE BOX
HPI:  Pt is a 66 y/o F with PMHx of T2DM, who presented to [facility] on 20 with acute onset of left sided weakness and slurred speech. Pt was outside of window for tPA. On admission, her BP was 202/109. CT head showed chronic ischemic changes, no acute hemorrhage, or ischemic penumbra. Moderate stenosis of the ICA origin due to calcified and noncalcified atheromatous plaque. MRI showed acute infarct in the right lenticular nucleus/corona radiata region. She wsa evaluated by PM&R and PT and was recommended for acute rehab. She was deemed stable for discharge on 20 and was transferred to [facility] for acute inpatient rehabilitation.  (2020 15:51)      Subjective    No new complaints      PAST MEDICAL & SURGICAL HISTORY:  GERD (gastroesophageal reflux disease)    DM (diabetes mellitus)    No significant past surgical history        MedsMEDICATIONS  (STANDING):  aspirin  chewable 81 milliGRAM(s) Oral daily  atorvastatin 80 milliGRAM(s) Oral at bedtime  dextrose 40% Gel 15 Gram(s) Oral once  dextrose 5%. 1000 milliLiter(s) (50 mL/Hr) IV Continuous <Continuous>  dextrose 5%. 1000 milliLiter(s) (100 mL/Hr) IV Continuous <Continuous>  dextrose 50% Injectable 25 Gram(s) IV Push once  dextrose 50% Injectable 12.5 Gram(s) IV Push once  dextrose 50% Injectable 25 Gram(s) IV Push once  enoxaparin Injectable 40 milliGRAM(s) SubCutaneous daily  FLUoxetine 20 milliGRAM(s) Oral daily  gabapentin 100 milliGRAM(s) Oral three times a day  glucagon  Injectable 1 milliGRAM(s) IntraMuscular once  insulin glargine Injectable (LANTUS) 23 Unit(s) SubCutaneous at bedtime  insulin lispro (ADMELOG) corrective regimen sliding scale   SubCutaneous three times a day before meals  insulin lispro (ADMELOG) corrective regimen sliding scale   SubCutaneous at bedtime  insulin lispro Injectable (ADMELOG) 6 Unit(s) SubCutaneous three times a day before meals  lisinopril 5 milliGRAM(s) Oral daily  melatonin 6 milliGRAM(s) Oral at bedtime  pantoprazole    Tablet 40 milliGRAM(s) Oral before breakfast  polyethylene glycol 3350 17 Gram(s) Oral two times a day  senna 2 Tablet(s) Oral at bedtime    MEDICATIONS  (PRN):  acetaminophen   Tablet .. 650 milliGRAM(s) Oral every 6 hours PRN Mild Pain (1 - 3), Moderate Pain (4 - 6), Severe Pain (7 - 10)      Vital Signs Last 24 Hrs  T(C): 36.8 (2020 10:03), Max: 36.8 (2020 10:03)  T(F): 98.3 (2020 10:03), Max: 98.3 (2020 10:03)  HR: 75 (2020 10:03) (70 - 82)  BP: 125/70 (2020 10:03) (125/70 - 155/84)  BP(mean): --  RR: 15 (2020 10:03) (14 - 15)  SpO2: 99% (2020 10:03) (98% - 99%)  I&O's Summary      PHYSICAL EXAM:  GENERAL: NAD  NECK: Supple  NERVOUS SYSTEM:  awake and alert  HEART: S1s2 NL , RRR  CHEST/LUNG: Clear to percussion bilaterally  ABDOMEN: Soft, Nontender, Nondistended; Bowel sounds present  EXTREMITIES:  No edema      LABS:( @ 05:30)                      10.7  8.68 )-----------( 283                 35.5    Neutrophils = -- (--%)  Lymphocytes = -- (--%)  Eosinophils = -- (--%)  Basophils = -- (--%)  Monocytes = -- (--%)  Bands = --%        135  |  99  |  18  ----------------------------<  197<H>  4.8   |  30  |  1.14    Ca    9.4      2020 05:30        Urinalysis Basic - ( 2020 18:00 )    Color: Yellow / Appearance: Clear / S.010 / pH: x  Gluc: x / Ketone: Negative  / Bili: Negative / Urobili: Negative   Blood: x / Protein: 100 / Nitrite: Negative   Leuk Esterase: Trace / RBC: 0-4 /HPF / WBC 0-2 /HPF   Sq Epi: x / Non Sq Epi: Neg.-Few / Bacteria: Few /HPF      CAPILLARY BLOOD GLUCOSE      POCT Blood Glucose.: 195 mg/dL (2020 12:00)  POCT Blood Glucose.: 196 mg/dL (2020 08:20)  POCT Blood Glucose.: 131 mg/dL (2020 21:17)  POCT Blood Glucose.: 221 mg/dL (2020 17:07)  POCT Blood Glucose.: 197 mg/dL (2020 12:19)      Imaging Personally Reviewed:  [ ] YES  [ ] NO        Care Discussed with Consultants/Other Providers [ x] YES  [ ] NO      CVA  PT/OT per rehab  ASA/Statin    HTN  lisinopril    DM2, a1c 7.9%  Inc Lantus to 23  Lispro premeal/SS    DVT ppx  Lovenox

## 2020-11-19 NOTE — PROGRESS NOTE ADULT - SUBJECTIVE AND OBJECTIVE BOX
HPI:  Pt is a 64 y/o F with PMHx of T2DM, who presented to [facility] on 20 with acute onset of left sided weakness and slurred speech. Pt was outside of window for tPA. On admission, her BP was 202/109. CT head showed chronic ischemic changes, no acute hemorrhage, or ischemic penumbra. Moderate stenosis of the ICA origin due to calcified and noncalcified atheromatous plaque. MRI showed acute infarct in the right lenticular nucleus/corona radiata region. She wsa evaluated by PM&R and PT and was recommended for acute rehab. She was deemed stable for discharge on 20 and was transferred to [facility] for acute inpatient rehabilitation.  (2020 15:51)    SUBJECTIVE / INTERVAL HPI: Patient seen and examined at bedside.     REVIEW OF SYSTEMS:    CONSTITUTIONAL: No weakness, fevers or chills  EYES/ENT: No visual changes;  No vertigo or throat pain   NECK: No pain or stiffness  RESPIRATORY: No cough, wheezing, or shortness of breath  CARDIOVASCULAR: No chest pain or palpitations  GASTROINTESTINAL: No abdominal or epigastric pain. No nausea or vomiting. No diarrhea or constipation.   GENITOURINARY: No dysuria, frequency or hematuria  NEUROLOGICAL: No numbness or weakness  SKIN: No itching, rashes      VITAL SIGNS:  Vital Signs Last 24 Hrs  T(C): 36.7 (2020 21:26), Max: 36.7 (2020 21:26)  T(F): 98.1 (2020 21:26), Max: 98.1 (2020 21:26)  HR: 82 (2020 05:25) (70 - 82)  BP: 149/77 (2020 05:25) (149/77 - 155/84)  BP(mean): --  RR: 14 (2020 05:25) (14 - 15)  SpO2: 98% (2020 05:25) (98% - 99%)    PHYSICAL EXAM:        MEDICATIONS:  MEDICATIONS  (STANDING):  aspirin  chewable 81 milliGRAM(s) Oral daily  atorvastatin 80 milliGRAM(s) Oral at bedtime  dextrose 40% Gel 15 Gram(s) Oral once  dextrose 5%. 1000 milliLiter(s) (50 mL/Hr) IV Continuous <Continuous>  dextrose 5%. 1000 milliLiter(s) (100 mL/Hr) IV Continuous <Continuous>  dextrose 50% Injectable 25 Gram(s) IV Push once  dextrose 50% Injectable 12.5 Gram(s) IV Push once  dextrose 50% Injectable 25 Gram(s) IV Push once  enoxaparin Injectable 40 milliGRAM(s) SubCutaneous daily  FLUoxetine 20 milliGRAM(s) Oral daily  gabapentin 100 milliGRAM(s) Oral three times a day  glucagon  Injectable 1 milliGRAM(s) IntraMuscular once  insulin glargine Injectable (LANTUS) 20 Unit(s) SubCutaneous at bedtime  insulin lispro (ADMELOG) corrective regimen sliding scale   SubCutaneous three times a day before meals  insulin lispro (ADMELOG) corrective regimen sliding scale   SubCutaneous at bedtime  insulin lispro Injectable (ADMELOG) 6 Unit(s) SubCutaneous three times a day before meals  lisinopril 5 milliGRAM(s) Oral daily  melatonin 6 milliGRAM(s) Oral at bedtime  pantoprazole    Tablet 40 milliGRAM(s) Oral before breakfast  polyethylene glycol 3350 17 Gram(s) Oral two times a day  senna 2 Tablet(s) Oral at bedtime    MEDICATIONS  (PRN):  acetaminophen   Tablet .. 650 milliGRAM(s) Oral every 6 hours PRN Mild Pain (1 - 3), Moderate Pain (4 - 6), Severe Pain (7 - 10)      ALLERGIES:  Allergies    No Known Allergies    Intolerances        LABS:                        10.7   8.68  )-----------( 283      ( 2020 05:30 )             35.5     11-19    135  |  99  |  18  ----------------------------<  197<H>  4.8   |  30  |  1.14    Ca    9.4      2020 05:30        Urinalysis Basic - ( 2020 18:00 )    Color: Yellow / Appearance: Clear / S.010 / pH: x  Gluc: x / Ketone: Negative  / Bili: Negative / Urobili: Negative   Blood: x / Protein: 100 / Nitrite: Negative   Leuk Esterase: Trace / RBC: 0-4 /HPF / WBC 0-2 /HPF   Sq Epi: x / Non Sq Epi: Neg.-Few / Bacteria: Few /HPF      CAPILLARY BLOOD GLUCOSE      POCT Blood Glucose.: 131 mg/dL (2020 21:17)      RADIOLOGY & ADDITIONAL TESTS: Reviewed. HPI:  Pt is a 64 y/o F with PMHx of T2DM, who presented to [facility] on 20 with acute onset of left sided weakness and slurred speech. Pt was outside of window for tPA. On admission, her BP was 202/109. CT head showed chronic ischemic changes, no acute hemorrhage, or ischemic penumbra. Moderate stenosis of the ICA origin due to calcified and noncalcified atheromatous plaque. MRI showed acute infarct in the right lenticular nucleus/corona radiata region. She wsa evaluated by PM&R and PT and was recommended for acute rehab. She was deemed stable for discharge on 20 and was transferred to [facility] for acute inpatient rehabilitation.  (2020 15:51)    SUBJECTIVE / INTERVAL HPI: Patient seen and examined at bedside. Creole  [id number] used. She is feeling well, slept well overnight, and is tolerating therapy. She denies any pain and has no complaints. She had the dietitian speak with her and her family yesterday.     REVIEW OF SYSTEMS:    CONSTITUTIONAL: No weakness, fevers or chills  EYES/ENT: No visual changes;  No vertigo or throat pain   NECK: No pain or stiffness  RESPIRATORY: No cough, wheezing, or shortness of breath  CARDIOVASCULAR: No chest pain or palpitations  GASTROINTESTINAL: No abdominal or epigastric pain. No nausea or vomiting. No diarrhea or constipation.   GENITOURINARY: No dysuria, frequency or hematuria  NEUROLOGICAL: No numbness, + weakness  SKIN: No itching, rashes      VITAL SIGNS:  Vital Signs Last 24 Hrs  T(C): 36.7 (2020 21:26), Max: 36.7 (2020 21:26)  T(F): 98.1 (2020 21:26), Max: 98.1 (2020 21:26)  HR: 82 (2020 05:25) (70 - 82)  BP: 149/77 (2020 05:25) (149/77 - 155/84)  BP(mean): --  RR: 14 (2020 05:25) (14 - 15)  SpO2: 98% (2020 05:25) (98% - 99%)    PHYSICAL EXAM:  General: NAD, sitting in wheelchair comfortably  HEENT: NC/AT; PERRL, anicteric sclera; MMM  Neck: supple  Cardiovascular: +S1/S2, RRR  Respiratory: CTA B/L; no W/R/R, no crackles  Gastrointestinal: soft, NT/ND; normoactive bowel sounds  Extremities: warm, well perfused; no edema, clubbing or cyanosis  Neurological: AAOx3; stable left hemiplegia, Left facial droop, LUE 0/5, LLE 1/5      MEDICATIONS:  MEDICATIONS  (STANDING):  aspirin  chewable 81 milliGRAM(s) Oral daily  atorvastatin 80 milliGRAM(s) Oral at bedtime  dextrose 40% Gel 15 Gram(s) Oral once  dextrose 5%. 1000 milliLiter(s) (50 mL/Hr) IV Continuous <Continuous>  dextrose 5%. 1000 milliLiter(s) (100 mL/Hr) IV Continuous <Continuous>  dextrose 50% Injectable 25 Gram(s) IV Push once  dextrose 50% Injectable 12.5 Gram(s) IV Push once  dextrose 50% Injectable 25 Gram(s) IV Push once  enoxaparin Injectable 40 milliGRAM(s) SubCutaneous daily  FLUoxetine 20 milliGRAM(s) Oral daily  gabapentin 100 milliGRAM(s) Oral three times a day  glucagon  Injectable 1 milliGRAM(s) IntraMuscular once  insulin glargine Injectable (LANTUS) 20 Unit(s) SubCutaneous at bedtime  insulin lispro (ADMELOG) corrective regimen sliding scale   SubCutaneous three times a day before meals  insulin lispro (ADMELOG) corrective regimen sliding scale   SubCutaneous at bedtime  insulin lispro Injectable (ADMELOG) 6 Unit(s) SubCutaneous three times a day before meals  lisinopril 5 milliGRAM(s) Oral daily  melatonin 6 milliGRAM(s) Oral at bedtime  pantoprazole    Tablet 40 milliGRAM(s) Oral before breakfast  polyethylene glycol 3350 17 Gram(s) Oral two times a day  senna 2 Tablet(s) Oral at bedtime    MEDICATIONS  (PRN):  acetaminophen   Tablet .. 650 milliGRAM(s) Oral every 6 hours PRN Mild Pain (1 - 3), Moderate Pain (4 - 6), Severe Pain (7 - 10)      ALLERGIES:  Allergies    No Known Allergies    Intolerances        LABS:                        10.7   8.68  )-----------( 283      ( 2020 05:30 )             35.5     -    135  |  99  |  18  ----------------------------<  197<H>  4.8   |  30  |  1.14    Ca    9.4      2020 05:30        Urinalysis Basic - ( 2020 18:00 )    Color: Yellow / Appearance: Clear / S.010 / pH: x  Gluc: x / Ketone: Negative  / Bili: Negative / Urobili: Negative   Blood: x / Protein: 100 / Nitrite: Negative   Leuk Esterase: Trace / RBC: 0-4 /HPF / WBC 0-2 /HPF   Sq Epi: x / Non Sq Epi: Neg.-Few / Bacteria: Few /HPF      CAPILLARY BLOOD GLUCOSE      POCT Blood Glucose.: 131 mg/dL (2020 21:17)      RADIOLOGY & ADDITIONAL TESTS: Reviewed.

## 2020-11-19 NOTE — PROGRESS NOTE ADULT - ASSESSMENT
66 y/o F with PMHx of T2DM, who presented to [facility] on 11/8/20 with acute onset of left sided weakness and slurred speech, found to have acute infarct of right lenticular nucleus/corona radiata region.     Right lenticular nucleus/corona radiata region infarct with left hemiplegia, left facial droop  - continue aspirin 81mg and - continue Lipitor  - Prozac for motor recovery  - PT/OT/SLP: total of 3 hrs/day 5 days/week     HTN:- continue Lisinopril 5mg daily, - Continue to monitor  BP controlled      T2DM: with hyperglycemia:    - Lantus 23 units at bedtime ( increased 11/19)   - continue Lispro 6u before meals  - ISS, - monitor fingersticks  - Spoke with nutritionist regarding education of patient and family on diabetic diet    #Mood  - continue Prozac  - neuropsych consult for mood, coping.     #Pain  - Continue Gabapentin 100mg tid  - Tylenol PRN    #GI  - senna, miralax  - Protonix    #:  - voiding, PVR =0    #Diet  - Dysphagia 3 Soft - Thin liquids  - Consistent carb, low sodium    #DVT ppx  - Lovenox    Precautions: fall,   Positioning: lWC - lap tray or trough on left for support     Hospitalist following    Team meeting 11/19  Nursing: Continent B/B  SW: lives in  with spouse, daughter, and son in law, prior to admission independent PTA  OT: set up/supervision for eating and grooming, min A UBD, mod A LBD, stand pivot transfers to commode, max A bathing  PT: Mod A transfers, Amb. 25 Ft HR,  ST: Soft diet/thins, Mild high level cognitive deficits, Left inattention improved.  Vital stim. Perceptive deficits, memory and problem solving difficulties  MARLO: 12/3

## 2020-11-20 LAB
GLUCOSE BLDC GLUCOMTR-MCNC: 146 MG/DL — HIGH (ref 70–99)
GLUCOSE BLDC GLUCOMTR-MCNC: 185 MG/DL — HIGH (ref 70–99)
GLUCOSE BLDC GLUCOMTR-MCNC: 191 MG/DL — HIGH (ref 70–99)
GLUCOSE BLDC GLUCOMTR-MCNC: 222 MG/DL — HIGH (ref 70–99)
SARS-COV-2 RNA SPEC QL NAA+PROBE: SIGNIFICANT CHANGE UP

## 2020-11-20 PROCEDURE — 99232 SBSQ HOSP IP/OBS MODERATE 35: CPT

## 2020-11-20 RX ADMIN — Medication 20 MILLIGRAM(S): at 12:10

## 2020-11-20 RX ADMIN — Medication 6 UNIT(S): at 17:25

## 2020-11-20 RX ADMIN — Medication 2: at 17:25

## 2020-11-20 RX ADMIN — INSULIN GLARGINE 23 UNIT(S): 100 INJECTION, SOLUTION SUBCUTANEOUS at 21:40

## 2020-11-20 RX ADMIN — Medication 81 MILLIGRAM(S): at 12:10

## 2020-11-20 RX ADMIN — GABAPENTIN 100 MILLIGRAM(S): 400 CAPSULE ORAL at 05:15

## 2020-11-20 RX ADMIN — Medication 6 MILLIGRAM(S): at 21:41

## 2020-11-20 RX ADMIN — GABAPENTIN 100 MILLIGRAM(S): 400 CAPSULE ORAL at 13:03

## 2020-11-20 RX ADMIN — GABAPENTIN 100 MILLIGRAM(S): 400 CAPSULE ORAL at 21:39

## 2020-11-20 RX ADMIN — Medication 6 UNIT(S): at 08:32

## 2020-11-20 RX ADMIN — PANTOPRAZOLE SODIUM 40 MILLIGRAM(S): 20 TABLET, DELAYED RELEASE ORAL at 05:15

## 2020-11-20 RX ADMIN — Medication 6 UNIT(S): at 12:11

## 2020-11-20 RX ADMIN — Medication 1: at 12:11

## 2020-11-20 RX ADMIN — ENOXAPARIN SODIUM 40 MILLIGRAM(S): 100 INJECTION SUBCUTANEOUS at 12:11

## 2020-11-20 RX ADMIN — Medication 1: at 08:32

## 2020-11-20 RX ADMIN — LISINOPRIL 5 MILLIGRAM(S): 2.5 TABLET ORAL at 05:15

## 2020-11-20 RX ADMIN — ATORVASTATIN CALCIUM 80 MILLIGRAM(S): 80 TABLET, FILM COATED ORAL at 21:39

## 2020-11-20 NOTE — PROGRESS NOTE ADULT - ASSESSMENT
66 y/o F with PMHx of T2DM, who presented to [facility] on 11/8/20 with acute onset of left sided weakness and slurred speech, found to have acute infarct of right lenticular nucleus/corona radiata region.     #Right lenticular nucleus/corona radiata region infarct with left hemiplegia, left facial droop  - continue aspirin 81mg  - continue Lipitor  - Prozac for motor recovery  - PT/OT/SLP: total of 3 hrs/day 5 days/week     #HTN  - continue Lisinopril 5mg daily  - Continue to monitor  #BP controlled      #T2DM with hyperglycemia  - Lantus 23 units at bedtime ( increased 11/19)   - continue Lispro 6u before meals  - ISS, - monitor fingersticks  - Spoke with nutritionist regarding education of patient and family on diabetic diet    #Mood  - continue Prozac  - neuropsych consult for mood, coping.     #Pain  - Continue Gabapentin 100mg tid  - Tylenol PRN    #GI  - senna, miralax  - Protonix    #:  - voiding, PVR =0    #Diet  - Dysphagia 3 Soft - Thin liquids  - Consistent carb, low sodium    #DVT ppx  - Lovenox    Precautions: fall,   Positioning: lWC - lap tray or trough on left for support     Hospitalist following    Team meeting 11/19  Nursing: Continent B/B  SW: lives in  with spouse, daughter, and son in law, prior to admission independent PTA  OT: set up/supervision for eating and grooming, min A UBD, mod A LBD, stand pivot transfers to commode, max A bathing  PT: Mod A transfers, Amb. 25 Ft HR,  ST: Soft diet/thins, Mild high level cognitive deficits, Left inattention improved.  Vital stim. Perceptive deficits, memory and problem solving difficulties  MARLO: 12/3         66 y/o F with PMHx of T2DM, who presented to [facility] on 11/8/20 with acute onset of left sided weakness and slurred speech, found to have acute infarct of right lenticular nucleus/corona radiata region.     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 Discussed with patient  - PT/OT/SLP: total of 3 hrs/day 5 days/week     #HTN  - continue Lisinopril 5mg daily  - Continue to monitor  - controlled    #T2DM with hyperglycemia  - Lantus 23 units at bedtime ( increased 11/19)   - continue Lispro 6u before meals  - ISS, - monitor fingersticks  - Spoke with nutritionist regarding education of patient and family on diabetic diet    #Mood  - continue Prozac  - neuropsych consult for mood, coping.     #Pain  - Continue Gabapentin 100mg tid  - Tylenol PRN    #GI  - senna, miralax  - Protonix    #:  - voiding, PVR =0    #Diet  - Dysphagia 3 Soft - Thin liquids  - Consistent carb, low sodium    #DVT ppx  - Lovenox    #Team meeting 11/19  Nursing: Continent B/B  SW: lives in  with spouse, daughter, and son in law, prior to admission independent PTA  OT: set up/supervision for eating and grooming, min A UBD, mod A LBD, stand pivot transfers to commode, max A bathing  PT: Mod A transfers, Amb. 25 Ft HR,  ST: Soft diet/thins, Mild high level cognitive deficits, Left inattention improved.  Vital stim. Perceptive deficits, memory and problem solving difficulties  MARLO: 12/3

## 2020-11-20 NOTE — PROGRESS NOTE ADULT - SUBJECTIVE AND OBJECTIVE BOX
HPI:  Pt is a 64 y/o F with PMHx of T2DM, who presented to [facility] on 20 with acute onset of left sided weakness and slurred speech. Pt was outside of window for tPA. On admission, her BP was 202/109. CT head showed chronic ischemic changes, no acute hemorrhage, or ischemic penumbra. Moderate stenosis of the ICA origin due to calcified and noncalcified atheromatous plaque. MRI showed acute infarct in the right lenticular nucleus/corona radiata region. She wsa evaluated by PM&R and PT and was recommended for acute rehab. She was deemed stable for discharge on 20 and was transferred to [facility] for acute inpatient rehabilitation.  (2020 15:51)    SUBJECTIVE / INTERVAL HPI: Patient seen and examined at bedside. Creole  [id number] used. She has no complaints this morning. She slept well overnight and is eating and drinking well.      REVIEW OF SYSTEMS:    CONSTITUTIONAL: No fevers or chills  EYES/ENT: No visual changes;  No vertigo or throat pain   NECK: No pain or stiffness  RESPIRATORY: No cough, wheezing, or shortness of breath  CARDIOVASCULAR: No chest pain or palpitations  GASTROINTESTINAL: No abdominal or epigastric pain. No nausea or vomiting. No diarrhea or constipation.   GENITOURINARY: No dysuria, frequency or hematuria  NEUROLOGICAL: No numbness, + Left sided weakness  SKIN: No itching, rashes      VITAL SIGNS:  Vital Signs Last 24 Hrs  T(C): 37.2 (2020 08:30), Max: 37.2 (2020 08:30)  T(F): 99 (2020 08:30), Max: 99 (2020 08:30)  HR: 72 (2020 08:30) (65 - 72)  BP: 132/75 (2020 08:30) (131/70 - 134/74)  BP(mean): --  RR: 14 (2020 08:30) (14 - 15)  SpO2: 98% (2020 08:30) (98% - 100%)    PHYSICAL EXAM:    General: NAD, comfortably sitting in chair  HEENT: NC/AT; PERRL, anicteric sclera; MMM  Neck: supple  Cardiovascular: +S1/S2, regular rate and rhythm  Respiratory: CTA B/L; no wheezes, no crackles  Gastrointestinal: soft, NT/ND; bowel sounds intact x4  Extremities: warm, well perfused; no edema, clubbing or cyanosis  Neurological: AAOx3; Motor exam with Left sided weakness UE worse than LE    MEDICATIONS:  MEDICATIONS  (STANDING):  aspirin  chewable 81 milliGRAM(s) Oral daily  atorvastatin 80 milliGRAM(s) Oral at bedtime  dextrose 40% Gel 15 Gram(s) Oral once  dextrose 5%. 1000 milliLiter(s) (50 mL/Hr) IV Continuous <Continuous>  dextrose 5%. 1000 milliLiter(s) (100 mL/Hr) IV Continuous <Continuous>  dextrose 50% Injectable 25 Gram(s) IV Push once  dextrose 50% Injectable 12.5 Gram(s) IV Push once  dextrose 50% Injectable 25 Gram(s) IV Push once  enoxaparin Injectable 40 milliGRAM(s) SubCutaneous daily  FLUoxetine 20 milliGRAM(s) Oral daily  gabapentin 100 milliGRAM(s) Oral three times a day  glucagon  Injectable 1 milliGRAM(s) IntraMuscular once  insulin glargine Injectable (LANTUS) 23 Unit(s) SubCutaneous at bedtime  insulin lispro (ADMELOG) corrective regimen sliding scale   SubCutaneous three times a day before meals  insulin lispro (ADMELOG) corrective regimen sliding scale   SubCutaneous at bedtime  insulin lispro Injectable (ADMELOG) 6 Unit(s) SubCutaneous three times a day before meals  lisinopril 5 milliGRAM(s) Oral daily  melatonin 6 milliGRAM(s) Oral at bedtime  pantoprazole    Tablet 40 milliGRAM(s) Oral before breakfast  polyethylene glycol 3350 17 Gram(s) Oral two times a day  senna 2 Tablet(s) Oral at bedtime    MEDICATIONS  (PRN):  acetaminophen   Tablet .. 650 milliGRAM(s) Oral every 6 hours PRN Mild Pain (1 - 3), Moderate Pain (4 - 6), Severe Pain (7 - 10)      ALLERGIES:  Allergies    No Known Allergies    Intolerances        LABS:                        10.7   8.68  )-----------( 283      ( 2020 05:30 )             35.5         135  |  99  |  18  ----------------------------<  197<H>  4.8   |  30  |  1.14    Ca    9.4      2020 05:30        Urinalysis Basic - ( 2020 18:00 )    Color: Yellow / Appearance: Clear / S.010 / pH: x  Gluc: x / Ketone: Negative  / Bili: Negative / Urobili: Negative   Blood: x / Protein: 100 / Nitrite: Negative   Leuk Esterase: Trace / RBC: 0-4 /HPF / WBC 0-2 /HPF   Sq Epi: x / Non Sq Epi: Neg.-Few / Bacteria: Few /HPF      CAPILLARY BLOOD GLUCOSE      POCT Blood Glucose.: 191 mg/dL (2020 08:28)      RADIOLOGY & ADDITIONAL TESTS: Reviewed. HPI:  Pt is a 64 y/o F with PMHx of T2DM, who presented to [facility] on 20 with acute onset of left sided weakness and slurred speech. Pt was outside of window for tPA. On admission, her BP was 202/109. CT head showed chronic ischemic changes, no acute hemorrhage, or ischemic penumbra. Moderate stenosis of the ICA origin due to calcified and noncalcified atheromatous plaque. MRI showed acute infarct in the right lenticular nucleus/corona radiata region. She wsa evaluated by PM&R and PT and was recommended for acute rehab. She was deemed stable for discharge on 20 and was transferred to [facility] for acute inpatient rehabilitation.  (2020 15:51)    SUBJECTIVE / INTERVAL HPI: Patient seen and examined at bedside. Creole  [id number] used. She has no complaints this morning. She slept well overnight and is eating and drinking well.     Patient also seen during PT, ambulating with left UE in sling , left knee immobilizer in place for stability. Reports feeling more power in leg. Denies pain     REVIEW OF SYSTEMS:    CONSTITUTIONAL: No fevers or chills    RESPIRATORY: No cough, wheezing, or shortness of breath  CARDIOVASCULAR: No chest pain or palpitations  GASTROINTESTINAL: No abdominal or epigastric pain. No nausea or vomiting. No diarrhea or constipation.   GENITOURINARY: No dysuria, frequency or hematuria  NEUROLOGICAL: No numbness, + Left sided weakness  SKIN: No itching, rashes      VITAL SIGNS:  Vital Signs Last 24 Hrs  T(C): 37.2 (2020 08:30), Max: 37.2 (2020 08:30)  T(F): 99 (2020 08:30), Max: 99 (2020 08:30)  HR: 72 (2020 08:30) (65 - 72)  BP: 132/75 (2020 08:30) (131/70 - 134/74)  BP(mean): --  RR: 14 (2020 08:30) (14 - 15)  SpO2: 98% (2020 08:30) (98% - 100%)    PHYSICAL EXAM:    General: NAD, yes/no reliable for simple personal information. follows simple commands  HEENT: NC/AT; PERRL, anicteric sclera  Cardiovascular: +S1/S2, regular rate and rhythm  Respiratory: CTA B/L; no wheezes, no crackles  Gastrointestinal: soft, NT/ND; bowel sounds intact x4  Extremities: warm, well perfused; no edema, clubbing or cyanosis  +left shoulder subluxation 1 finger breadth  no TTP joint space  trace left hand swelling, mild flexor tone fingers, discomfort with PROm left wrist. 0/5 finger, wrist movement  Neurological: AAOx3; Motor exam with Left sided weakness UE worse than LE    MEDICATIONS:  MEDICATIONS  (STANDING):  aspirin  chewable 81 milliGRAM(s) Oral daily  atorvastatin 80 milliGRAM(s) Oral at bedtime  dextrose 40% Gel 15 Gram(s) Oral once  dextrose 5%. 1000 milliLiter(s) (50 mL/Hr) IV Continuous <Continuous>  dextrose 5%. 1000 milliLiter(s) (100 mL/Hr) IV Continuous <Continuous>  dextrose 50% Injectable 25 Gram(s) IV Push once  dextrose 50% Injectable 12.5 Gram(s) IV Push once  dextrose 50% Injectable 25 Gram(s) IV Push once  enoxaparin Injectable 40 milliGRAM(s) SubCutaneous daily  FLUoxetine 20 milliGRAM(s) Oral daily  gabapentin 100 milliGRAM(s) Oral three times a day  glucagon  Injectable 1 milliGRAM(s) IntraMuscular once  insulin glargine Injectable (LANTUS) 23 Unit(s) SubCutaneous at bedtime  insulin lispro (ADMELOG) corrective regimen sliding scale   SubCutaneous three times a day before meals  insulin lispro (ADMELOG) corrective regimen sliding scale   SubCutaneous at bedtime  insulin lispro Injectable (ADMELOG) 6 Unit(s) SubCutaneous three times a day before meals  lisinopril 5 milliGRAM(s) Oral daily  melatonin 6 milliGRAM(s) Oral at bedtime  pantoprazole    Tablet 40 milliGRAM(s) Oral before breakfast  polyethylene glycol 3350 17 Gram(s) Oral two times a day  senna 2 Tablet(s) Oral at bedtime    MEDICATIONS  (PRN):  acetaminophen   Tablet .. 650 milliGRAM(s) Oral every 6 hours PRN Mild Pain (1 - 3), Moderate Pain (4 - 6), Severe Pain (7 - 10)      ALLERGIES:  Allergies    No Known Allergies    Intolerances        LABS:                        10.7   8.68  )-----------( 283      ( 2020 05:30 )             35.5     11-19    135  |  99  |  18  ----------------------------<  197<H>  4.8   |  30  |  1.14    Ca    9.4      2020 05:30        Urinalysis Basic - ( 2020 18:00 )    Color: Yellow / Appearance: Clear / S.010 / pH: x  Gluc: x / Ketone: Negative  / Bili: Negative / Urobili: Negative   Blood: x / Protein: 100 / Nitrite: Negative   Leuk Esterase: Trace / RBC: 0-4 /HPF / WBC 0-2 /HPF   Sq Epi: x / Non Sq Epi: Neg.-Few / Bacteria: Few /HPF      CAPILLARY BLOOD GLUCOSE      POCT Blood Glucose.: 191 mg/dL (2020 08:28)      RADIOLOGY & ADDITIONAL TESTS: Reviewed.

## 2020-11-21 LAB
GLUCOSE BLDC GLUCOMTR-MCNC: 196 MG/DL — HIGH (ref 70–99)
GLUCOSE BLDC GLUCOMTR-MCNC: 197 MG/DL — HIGH (ref 70–99)
GLUCOSE BLDC GLUCOMTR-MCNC: 214 MG/DL — HIGH (ref 70–99)
GLUCOSE BLDC GLUCOMTR-MCNC: 248 MG/DL — HIGH (ref 70–99)

## 2020-11-21 PROCEDURE — 99232 SBSQ HOSP IP/OBS MODERATE 35: CPT

## 2020-11-21 RX ORDER — INSULIN GLARGINE 100 [IU]/ML
25 INJECTION, SOLUTION SUBCUTANEOUS AT BEDTIME
Refills: 0 | Status: DISCONTINUED | OUTPATIENT
Start: 2020-11-21 | End: 2020-11-22

## 2020-11-21 RX ADMIN — Medication 6 MILLIGRAM(S): at 22:09

## 2020-11-21 RX ADMIN — GABAPENTIN 100 MILLIGRAM(S): 400 CAPSULE ORAL at 22:09

## 2020-11-21 RX ADMIN — Medication 6 UNIT(S): at 08:02

## 2020-11-21 RX ADMIN — Medication 81 MILLIGRAM(S): at 11:44

## 2020-11-21 RX ADMIN — SENNA PLUS 2 TABLET(S): 8.6 TABLET ORAL at 22:09

## 2020-11-21 RX ADMIN — Medication 2: at 17:00

## 2020-11-21 RX ADMIN — GABAPENTIN 100 MILLIGRAM(S): 400 CAPSULE ORAL at 13:22

## 2020-11-21 RX ADMIN — Medication 1: at 11:45

## 2020-11-21 RX ADMIN — ENOXAPARIN SODIUM 40 MILLIGRAM(S): 100 INJECTION SUBCUTANEOUS at 11:44

## 2020-11-21 RX ADMIN — ATORVASTATIN CALCIUM 80 MILLIGRAM(S): 80 TABLET, FILM COATED ORAL at 22:09

## 2020-11-21 RX ADMIN — PANTOPRAZOLE SODIUM 40 MILLIGRAM(S): 20 TABLET, DELAYED RELEASE ORAL at 06:44

## 2020-11-21 RX ADMIN — Medication 6 UNIT(S): at 11:45

## 2020-11-21 RX ADMIN — Medication 20 MILLIGRAM(S): at 11:44

## 2020-11-21 RX ADMIN — INSULIN GLARGINE 25 UNIT(S): 100 INJECTION, SOLUTION SUBCUTANEOUS at 22:08

## 2020-11-21 RX ADMIN — GABAPENTIN 100 MILLIGRAM(S): 400 CAPSULE ORAL at 06:44

## 2020-11-21 RX ADMIN — Medication 1: at 08:02

## 2020-11-21 RX ADMIN — Medication 6 UNIT(S): at 16:59

## 2020-11-21 RX ADMIN — LISINOPRIL 5 MILLIGRAM(S): 2.5 TABLET ORAL at 06:44

## 2020-11-21 NOTE — PROGRESS NOTE ADULT - SUBJECTIVE AND OBJECTIVE BOX
Chief complaint: no new complaints     Patient is a 65y old  Female who presents with a chief complaint of 01.1: Left Body Involvement (20 Nov 2020 10:06)    PAST MEDICAL & SURGICAL HISTORY:  GERD (gastroesophageal reflux disease)    DM (diabetes mellitus)    No significant past surgical history    VITALS  Vital Signs Last 24 Hrs  T(C): 36.6 (21 Nov 2020 08:00), Max: 36.6 (20 Nov 2020 21:34)  T(F): 97.9 (21 Nov 2020 08:00), Max: 97.9 (20 Nov 2020 21:34)  HR: 76 (21 Nov 2020 08:00) (75 - 80)  BP: 121/70 (21 Nov 2020 08:00) (121/70 - 134/75)  BP(mean): --  RR: 14 (21 Nov 2020 08:00) (14 - 15)  SpO2: 97% (21 Nov 2020 08:00) (95% - 97%)      PHYSICAL EXAM  Constitutional - NAD, Comfortable  HEENT - NCAT, EOMI  Neck - Supple, No limited ROM  Chest - CTA bilaterally  Cardiovascular - RRR, S1S2  Abdomen - BS+, Soft, NTND  Extremities -No calf tenderness   Neurologic Exam -                    Cognitive - Awake, Alert     No new focal deficits                    CURRENT MEDICATIONS    MEDICATIONS  (STANDING):  aspirin  chewable 81 milliGRAM(s) Oral daily  atorvastatin 80 milliGRAM(s) Oral at bedtime  dextrose 40% Gel 15 Gram(s) Oral once  dextrose 5%. 1000 milliLiter(s) (50 mL/Hr) IV Continuous <Continuous>  dextrose 5%. 1000 milliLiter(s) (100 mL/Hr) IV Continuous <Continuous>  dextrose 50% Injectable 25 Gram(s) IV Push once  dextrose 50% Injectable 12.5 Gram(s) IV Push once  dextrose 50% Injectable 25 Gram(s) IV Push once  enoxaparin Injectable 40 milliGRAM(s) SubCutaneous daily  FLUoxetine 20 milliGRAM(s) Oral daily  gabapentin 100 milliGRAM(s) Oral three times a day  glucagon  Injectable 1 milliGRAM(s) IntraMuscular once  insulin glargine Injectable (LANTUS) 23 Unit(s) SubCutaneous at bedtime  insulin lispro (ADMELOG) corrective regimen sliding scale   SubCutaneous three times a day before meals  insulin lispro (ADMELOG) corrective regimen sliding scale   SubCutaneous at bedtime  insulin lispro Injectable (ADMELOG) 6 Unit(s) SubCutaneous three times a day before meals  lisinopril 5 milliGRAM(s) Oral daily  melatonin 6 milliGRAM(s) Oral at bedtime  pantoprazole    Tablet 40 milliGRAM(s) Oral before breakfast  polyethylene glycol 3350 17 Gram(s) Oral two times a day  senna 2 Tablet(s) Oral at bedtime    MEDICATIONS  (PRN):  acetaminophen   Tablet .. 650 milliGRAM(s) Oral every 6 hours PRN Mild Pain (1 - 3), Moderate Pain (4 - 6), Severe Pain (7 - 10)    ASSESSMENT & PLAN          GI/Bowel Management - Dulcolax PRN, Fleet PRN   Management - Toilet Q2  Skin - Turn Q2  Pain - Tylenol PRN  DVT PPX - Lovenox      Continue comprehensive acute rehab program consisting of 3hrs/day of OT/PT and SLP.

## 2020-11-21 NOTE — PROGRESS NOTE ADULT - SUBJECTIVE AND OBJECTIVE BOX
HPI:  Pt is a 66 y/o F with PMHx of T2DM, who presented to [facility] on 11/8/20 with acute onset of left sided weakness and slurred speech. Pt was outside of window for tPA. On admission, her BP was 202/109. CT head showed chronic ischemic changes, no acute hemorrhage, or ischemic penumbra. Moderate stenosis of the ICA origin due to calcified and noncalcified atheromatous plaque. MRI showed acute infarct in the right lenticular nucleus/corona radiata region. She wsa evaluated by PM&R and PT and was recommended for acute rehab. She was deemed stable for discharge on 11/12/20 and was transferred to [facility] for acute inpatient rehabilitation.  (12 Nov 2020 15:51)      Subjective    Doing well. no complaints.       PAST MEDICAL & SURGICAL HISTORY:  GERD (gastroesophageal reflux disease)    DM (diabetes mellitus)    No significant past surgical history        MedsMEDICATIONS  (STANDING):  aspirin  chewable 81 milliGRAM(s) Oral daily  atorvastatin 80 milliGRAM(s) Oral at bedtime  dextrose 40% Gel 15 Gram(s) Oral once  dextrose 5%. 1000 milliLiter(s) (50 mL/Hr) IV Continuous <Continuous>  dextrose 5%. 1000 milliLiter(s) (100 mL/Hr) IV Continuous <Continuous>  dextrose 50% Injectable 25 Gram(s) IV Push once  dextrose 50% Injectable 12.5 Gram(s) IV Push once  dextrose 50% Injectable 25 Gram(s) IV Push once  enoxaparin Injectable 40 milliGRAM(s) SubCutaneous daily  FLUoxetine 20 milliGRAM(s) Oral daily  gabapentin 100 milliGRAM(s) Oral three times a day  glucagon  Injectable 1 milliGRAM(s) IntraMuscular once  insulin glargine Injectable (LANTUS) 23 Unit(s) SubCutaneous at bedtime  insulin lispro (ADMELOG) corrective regimen sliding scale   SubCutaneous three times a day before meals  insulin lispro (ADMELOG) corrective regimen sliding scale   SubCutaneous at bedtime  insulin lispro Injectable (ADMELOG) 6 Unit(s) SubCutaneous three times a day before meals  lisinopril 5 milliGRAM(s) Oral daily  melatonin 6 milliGRAM(s) Oral at bedtime  pantoprazole    Tablet 40 milliGRAM(s) Oral before breakfast  polyethylene glycol 3350 17 Gram(s) Oral two times a day  senna 2 Tablet(s) Oral at bedtime    MEDICATIONS  (PRN):  acetaminophen   Tablet .. 650 milliGRAM(s) Oral every 6 hours PRN Mild Pain (1 - 3), Moderate Pain (4 - 6), Severe Pain (7 - 10)      Vital Signs Last 24 Hrs  T(C): 36.6 (21 Nov 2020 08:00), Max: 36.6 (20 Nov 2020 21:34)  T(F): 97.9 (21 Nov 2020 08:00), Max: 97.9 (20 Nov 2020 21:34)  HR: 76 (21 Nov 2020 08:00) (75 - 80)  BP: 121/70 (21 Nov 2020 08:00) (121/70 - 134/75)  BP(mean): --  RR: 14 (21 Nov 2020 08:00) (14 - 15)  SpO2: 97% (21 Nov 2020 08:00) (95% - 97%)  I&O's Summary      PHYSICAL EXAM:  GENERAL: NAD  NECK: Supple  NERVOUS SYSTEM:  awake and alert  HEART: S1s2 NL , RRR  CHEST/LUNG: Clear to percussion bilaterally  ABDOMEN: Soft, Nontender, Nondistended; Bowel sounds present  EXTREMITIES:  No edema        CAPILLARY BLOOD GLUCOSE      POCT Blood Glucose.: 197 mg/dL (21 Nov 2020 11:20)  POCT Blood Glucose.: 196 mg/dL (21 Nov 2020 07:57)  POCT Blood Glucose.: 146 mg/dL (20 Nov 2020 21:38)  POCT Blood Glucose.: 222 mg/dL (20 Nov 2020 17:23)  POCT Blood Glucose.: 185 mg/dL (20 Nov 2020 12:08)      Imaging Personally Reviewed:  [ ] YES  [ ] NO        Care Discussed with Consultants/Other Providers [ x] YES  [ ] NO      CVA  PT/OT per rehab  ASA/Statin    HTN  lisinopril    DM2, a1c 7.9%  Inc Lantus to 25  Lispro premeal/SS    DVT ppx  Lovenox

## 2020-11-22 LAB
GLUCOSE BLDC GLUCOMTR-MCNC: 187 MG/DL — HIGH (ref 70–99)
GLUCOSE BLDC GLUCOMTR-MCNC: 193 MG/DL — HIGH (ref 70–99)
GLUCOSE BLDC GLUCOMTR-MCNC: 211 MG/DL — HIGH (ref 70–99)
GLUCOSE BLDC GLUCOMTR-MCNC: 213 MG/DL — HIGH (ref 70–99)

## 2020-11-22 PROCEDURE — 99232 SBSQ HOSP IP/OBS MODERATE 35: CPT

## 2020-11-22 RX ORDER — INSULIN GLARGINE 100 [IU]/ML
28 INJECTION, SOLUTION SUBCUTANEOUS AT BEDTIME
Refills: 0 | Status: DISCONTINUED | OUTPATIENT
Start: 2020-11-22 | End: 2020-11-25

## 2020-11-22 RX ORDER — INSULIN LISPRO 100/ML
7 VIAL (ML) SUBCUTANEOUS
Refills: 0 | Status: DISCONTINUED | OUTPATIENT
Start: 2020-11-22 | End: 2020-11-25

## 2020-11-22 RX ADMIN — Medication 7 UNIT(S): at 17:22

## 2020-11-22 RX ADMIN — GABAPENTIN 100 MILLIGRAM(S): 400 CAPSULE ORAL at 05:22

## 2020-11-22 RX ADMIN — PANTOPRAZOLE SODIUM 40 MILLIGRAM(S): 20 TABLET, DELAYED RELEASE ORAL at 05:22

## 2020-11-22 RX ADMIN — Medication 6 MILLIGRAM(S): at 22:04

## 2020-11-22 RX ADMIN — ATORVASTATIN CALCIUM 80 MILLIGRAM(S): 80 TABLET, FILM COATED ORAL at 22:04

## 2020-11-22 RX ADMIN — Medication 20 MILLIGRAM(S): at 11:48

## 2020-11-22 RX ADMIN — GABAPENTIN 100 MILLIGRAM(S): 400 CAPSULE ORAL at 14:43

## 2020-11-22 RX ADMIN — POLYETHYLENE GLYCOL 3350 17 GRAM(S): 17 POWDER, FOR SOLUTION ORAL at 05:22

## 2020-11-22 RX ADMIN — LISINOPRIL 5 MILLIGRAM(S): 2.5 TABLET ORAL at 05:22

## 2020-11-22 RX ADMIN — ENOXAPARIN SODIUM 40 MILLIGRAM(S): 100 INJECTION SUBCUTANEOUS at 11:47

## 2020-11-22 RX ADMIN — Medication 2: at 17:22

## 2020-11-22 RX ADMIN — GABAPENTIN 100 MILLIGRAM(S): 400 CAPSULE ORAL at 22:04

## 2020-11-22 RX ADMIN — Medication 1: at 11:46

## 2020-11-22 RX ADMIN — Medication 2: at 08:18

## 2020-11-22 RX ADMIN — Medication 6 UNIT(S): at 08:19

## 2020-11-22 RX ADMIN — Medication 81 MILLIGRAM(S): at 11:47

## 2020-11-22 RX ADMIN — SENNA PLUS 2 TABLET(S): 8.6 TABLET ORAL at 22:04

## 2020-11-22 RX ADMIN — INSULIN GLARGINE 28 UNIT(S): 100 INJECTION, SOLUTION SUBCUTANEOUS at 22:04

## 2020-11-22 NOTE — PROGRESS NOTE ADULT - SUBJECTIVE AND OBJECTIVE BOX
Chief complaint: no new complaints      Patient is a 65y old  Female who presents with a chief complaint of 01.1: Left Body Involvement (21 Nov 2020 11:34)      PAST MEDICAL & SURGICAL HISTORY:  GERD (gastroesophageal reflux disease)    DM (diabetes mellitus)    No significant past surgical history        VITALS  Vital Signs Last 24 Hrs  T(C): 36.7 (22 Nov 2020 08:58), Max: 36.7 (21 Nov 2020 20:41)  T(F): 98.1 (22 Nov 2020 08:58), Max: 98.1 (22 Nov 2020 08:58)  HR: 72 (22 Nov 2020 08:58) (68 - 77)  BP: 120/72 (22 Nov 2020 08:58) (120/72 - 150/73)  BP(mean): --  RR: 15 (22 Nov 2020 08:58) (14 - 15)  SpO2: 98% (22 Nov 2020 08:58) (98% - 99%)      PHYSICAL EXAM  Constitutional - NAD, Comfortable  HEENT - NCAT, EOMI  Neck - Supple, No limited ROM  Chest - CTA bilaterally  Cardiovascular - RRR, S1S2  Abdomen - BS+, Soft, NTND  Extremities - No calf tenderness   Neurologic Exam -                    Cognitive - Awake, Alert     No new focal deficits                   CURRENT MEDICATIONS    MEDICATIONS  (STANDING):  aspirin  chewable 81 milliGRAM(s) Oral daily  atorvastatin 80 milliGRAM(s) Oral at bedtime  dextrose 40% Gel 15 Gram(s) Oral once  dextrose 5%. 1000 milliLiter(s) (50 mL/Hr) IV Continuous <Continuous>  dextrose 5%. 1000 milliLiter(s) (100 mL/Hr) IV Continuous <Continuous>  dextrose 50% Injectable 25 Gram(s) IV Push once  dextrose 50% Injectable 12.5 Gram(s) IV Push once  dextrose 50% Injectable 25 Gram(s) IV Push once  enoxaparin Injectable 40 milliGRAM(s) SubCutaneous daily  FLUoxetine 20 milliGRAM(s) Oral daily  gabapentin 100 milliGRAM(s) Oral three times a day  glucagon  Injectable 1 milliGRAM(s) IntraMuscular once  insulin glargine Injectable (LANTUS) 25 Unit(s) SubCutaneous at bedtime  insulin lispro (ADMELOG) corrective regimen sliding scale   SubCutaneous three times a day before meals  insulin lispro (ADMELOG) corrective regimen sliding scale   SubCutaneous at bedtime  insulin lispro Injectable (ADMELOG) 6 Unit(s) SubCutaneous three times a day before meals  lisinopril 5 milliGRAM(s) Oral daily  melatonin 6 milliGRAM(s) Oral at bedtime  pantoprazole    Tablet 40 milliGRAM(s) Oral before breakfast  polyethylene glycol 3350 17 Gram(s) Oral two times a day  senna 2 Tablet(s) Oral at bedtime    MEDICATIONS  (PRN):  acetaminophen   Tablet .. 650 milliGRAM(s) Oral every 6 hours PRN Mild Pain (1 - 3), Moderate Pain (4 - 6), Severe Pain (7 - 10)    ASSESSMENT & PLAN          GI/Bowel Management - Dulcolax PRN, Fleet PRN   Management - Toilet Q2  Skin - Turn Q2  Pain - Tylenol PRN  DVT PPX - Lovenox      Continue comprehensive acute rehab program consisting of 3hrs/day of OT/PT and SLP.

## 2020-11-22 NOTE — PROGRESS NOTE ADULT - SUBJECTIVE AND OBJECTIVE BOX
HPI:  Pt is a 66 y/o F with PMHx of T2DM, who presented to [facility] on 11/8/20 with acute onset of left sided weakness and slurred speech. Pt was outside of window for tPA. On admission, her BP was 202/109. CT head showed chronic ischemic changes, no acute hemorrhage, or ischemic penumbra. Moderate stenosis of the ICA origin due to calcified and noncalcified atheromatous plaque. MRI showed acute infarct in the right lenticular nucleus/corona radiata region. She wsa evaluated by PM&R and PT and was recommended for acute rehab. She was deemed stable for discharge on 11/12/20 and was transferred to [facility] for acute inpatient rehabilitation.  (12 Nov 2020 15:51)      Subjective  No issues overnight.         PAST MEDICAL & SURGICAL HISTORY:  GERD (gastroesophageal reflux disease)    DM (diabetes mellitus)    No significant past surgical history        MedsMEDICATIONS  (STANDING):  aspirin  chewable 81 milliGRAM(s) Oral daily  atorvastatin 80 milliGRAM(s) Oral at bedtime  dextrose 40% Gel 15 Gram(s) Oral once  dextrose 5%. 1000 milliLiter(s) (50 mL/Hr) IV Continuous <Continuous>  dextrose 5%. 1000 milliLiter(s) (100 mL/Hr) IV Continuous <Continuous>  dextrose 50% Injectable 25 Gram(s) IV Push once  dextrose 50% Injectable 12.5 Gram(s) IV Push once  dextrose 50% Injectable 25 Gram(s) IV Push once  enoxaparin Injectable 40 milliGRAM(s) SubCutaneous daily  FLUoxetine 20 milliGRAM(s) Oral daily  gabapentin 100 milliGRAM(s) Oral three times a day  glucagon  Injectable 1 milliGRAM(s) IntraMuscular once  insulin glargine Injectable (LANTUS) 28 Unit(s) SubCutaneous at bedtime  insulin lispro (ADMELOG) corrective regimen sliding scale   SubCutaneous three times a day before meals  insulin lispro (ADMELOG) corrective regimen sliding scale   SubCutaneous at bedtime  insulin lispro Injectable (ADMELOG) 7 Unit(s) SubCutaneous three times a day before meals  lisinopril 5 milliGRAM(s) Oral daily  melatonin 6 milliGRAM(s) Oral at bedtime  pantoprazole    Tablet 40 milliGRAM(s) Oral before breakfast  polyethylene glycol 3350 17 Gram(s) Oral two times a day  senna 2 Tablet(s) Oral at bedtime    MEDICATIONS  (PRN):  acetaminophen   Tablet .. 650 milliGRAM(s) Oral every 6 hours PRN Mild Pain (1 - 3), Moderate Pain (4 - 6), Severe Pain (7 - 10)      Vital Signs Last 24 Hrs  T(C): 36.7 (22 Nov 2020 08:58), Max: 36.7 (21 Nov 2020 20:41)  T(F): 98.1 (22 Nov 2020 08:58), Max: 98.1 (22 Nov 2020 08:58)  HR: 72 (22 Nov 2020 08:58) (68 - 77)  BP: 120/72 (22 Nov 2020 08:58) (120/72 - 150/73)  BP(mean): --  RR: 15 (22 Nov 2020 08:58) (14 - 15)  SpO2: 98% (22 Nov 2020 08:58) (98% - 99%)  I&O's Summary      PHYSICAL EXAM:  GENERAL: NAD  NECK: Supple  NERVOUS SYSTEM:  awake and alert  HEART: S1s2 NL , RRR  CHEST/LUNG: Clear to percussion bilaterally  ABDOMEN: Soft, Nontender, Nondistended; Bowel sounds present  EXTREMITIES:  No edema          CAPILLARY BLOOD GLUCOSE      POCT Blood Glucose.: 187 mg/dL (22 Nov 2020 11:42)  POCT Blood Glucose.: 211 mg/dL (22 Nov 2020 08:14)  POCT Blood Glucose.: 214 mg/dL (21 Nov 2020 22:07)  POCT Blood Glucose.: 248 mg/dL (21 Nov 2020 16:58)      Imaging Personally Reviewed:  [ ] YES  [ ] NO        Care Discussed with Consultants/Other Providers [ x] YES  [ ] NO    CVA  PT/OT per rehab  ASA/Statin    HTN  lisinopril    DM2, a1c 7.9%  Inc Lantus to 28  Inc Lispro premeal to 7/SS    DVT ppx  Lovenox

## 2020-11-23 LAB
ALBUMIN SERPL ELPH-MCNC: 3.3 G/DL — SIGNIFICANT CHANGE UP (ref 3.3–5)
ALP SERPL-CCNC: 88 U/L — SIGNIFICANT CHANGE UP (ref 40–120)
ALT FLD-CCNC: 27 U/L — SIGNIFICANT CHANGE UP (ref 10–45)
ANION GAP SERPL CALC-SCNC: 8 MMOL/L — SIGNIFICANT CHANGE UP (ref 5–17)
AST SERPL-CCNC: 18 U/L — SIGNIFICANT CHANGE UP (ref 10–40)
BILIRUB DIRECT SERPL-MCNC: 0.1 MG/DL — SIGNIFICANT CHANGE UP (ref 0–0.2)
BILIRUB INDIRECT FLD-MCNC: 0.2 MG/DL — SIGNIFICANT CHANGE UP (ref 0.2–1)
BILIRUB SERPL-MCNC: 0.3 MG/DL — SIGNIFICANT CHANGE UP (ref 0.2–1.2)
BUN SERPL-MCNC: 17 MG/DL — SIGNIFICANT CHANGE UP (ref 7–23)
CALCIUM SERPL-MCNC: 9.3 MG/DL — SIGNIFICANT CHANGE UP (ref 8.4–10.5)
CHLORIDE SERPL-SCNC: 104 MMOL/L — SIGNIFICANT CHANGE UP (ref 96–108)
CO2 SERPL-SCNC: 27 MMOL/L — SIGNIFICANT CHANGE UP (ref 22–31)
CREAT SERPL-MCNC: 1.12 MG/DL — SIGNIFICANT CHANGE UP (ref 0.5–1.3)
GLUCOSE BLDC GLUCOMTR-MCNC: 136 MG/DL — HIGH (ref 70–99)
GLUCOSE BLDC GLUCOMTR-MCNC: 152 MG/DL — HIGH (ref 70–99)
GLUCOSE BLDC GLUCOMTR-MCNC: 157 MG/DL — HIGH (ref 70–99)
GLUCOSE BLDC GLUCOMTR-MCNC: 194 MG/DL — HIGH (ref 70–99)
GLUCOSE SERPL-MCNC: 172 MG/DL — HIGH (ref 70–99)
HCT VFR BLD CALC: 36.1 % — SIGNIFICANT CHANGE UP (ref 34.5–45)
HGB BLD-MCNC: 10.9 G/DL — LOW (ref 11.5–15.5)
MCHC RBC-ENTMCNC: 26.2 PG — LOW (ref 27–34)
MCHC RBC-ENTMCNC: 30.2 GM/DL — LOW (ref 32–36)
MCV RBC AUTO: 86.8 FL — SIGNIFICANT CHANGE UP (ref 80–100)
NRBC # BLD: 0 /100 WBCS — SIGNIFICANT CHANGE UP (ref 0–0)
PLATELET # BLD AUTO: 303 K/UL — SIGNIFICANT CHANGE UP (ref 150–400)
POTASSIUM SERPL-MCNC: 4.4 MMOL/L — SIGNIFICANT CHANGE UP (ref 3.5–5.3)
POTASSIUM SERPL-SCNC: 4.4 MMOL/L — SIGNIFICANT CHANGE UP (ref 3.5–5.3)
PROT SERPL-MCNC: 8.2 G/DL — SIGNIFICANT CHANGE UP (ref 6–8.3)
RBC # BLD: 4.16 M/UL — SIGNIFICANT CHANGE UP (ref 3.8–5.2)
RBC # FLD: 13.5 % — SIGNIFICANT CHANGE UP (ref 10.3–14.5)
SODIUM SERPL-SCNC: 139 MMOL/L — SIGNIFICANT CHANGE UP (ref 135–145)
WBC # BLD: 9.78 K/UL — SIGNIFICANT CHANGE UP (ref 3.8–10.5)
WBC # FLD AUTO: 9.78 K/UL — SIGNIFICANT CHANGE UP (ref 3.8–10.5)

## 2020-11-23 PROCEDURE — 99232 SBSQ HOSP IP/OBS MODERATE 35: CPT

## 2020-11-23 RX ADMIN — ATORVASTATIN CALCIUM 80 MILLIGRAM(S): 80 TABLET, FILM COATED ORAL at 21:07

## 2020-11-23 RX ADMIN — Medication 6 MILLIGRAM(S): at 21:07

## 2020-11-23 RX ADMIN — GABAPENTIN 100 MILLIGRAM(S): 400 CAPSULE ORAL at 21:07

## 2020-11-23 RX ADMIN — PANTOPRAZOLE SODIUM 40 MILLIGRAM(S): 20 TABLET, DELAYED RELEASE ORAL at 05:39

## 2020-11-23 RX ADMIN — INSULIN GLARGINE 28 UNIT(S): 100 INJECTION, SOLUTION SUBCUTANEOUS at 21:08

## 2020-11-23 RX ADMIN — SENNA PLUS 2 TABLET(S): 8.6 TABLET ORAL at 21:07

## 2020-11-23 RX ADMIN — GABAPENTIN 100 MILLIGRAM(S): 400 CAPSULE ORAL at 05:39

## 2020-11-23 RX ADMIN — LISINOPRIL 5 MILLIGRAM(S): 2.5 TABLET ORAL at 05:39

## 2020-11-23 RX ADMIN — Medication 7 UNIT(S): at 08:30

## 2020-11-23 RX ADMIN — GABAPENTIN 100 MILLIGRAM(S): 400 CAPSULE ORAL at 14:16

## 2020-11-23 RX ADMIN — Medication 1: at 16:58

## 2020-11-23 RX ADMIN — Medication 20 MILLIGRAM(S): at 11:23

## 2020-11-23 RX ADMIN — POLYETHYLENE GLYCOL 3350 17 GRAM(S): 17 POWDER, FOR SOLUTION ORAL at 17:05

## 2020-11-23 RX ADMIN — ENOXAPARIN SODIUM 40 MILLIGRAM(S): 100 INJECTION SUBCUTANEOUS at 11:23

## 2020-11-23 RX ADMIN — Medication 7 UNIT(S): at 16:59

## 2020-11-23 RX ADMIN — Medication 7 UNIT(S): at 11:31

## 2020-11-23 RX ADMIN — Medication 81 MILLIGRAM(S): at 11:23

## 2020-11-23 RX ADMIN — Medication 1: at 11:31

## 2020-11-23 RX ADMIN — Medication 1: at 08:30

## 2020-11-23 NOTE — PROGRESS NOTE ADULT - ASSESSMENT
66 y/o F with PMHx of T2DM, who presented to [facility] on 11/8/20 with acute onset of left sided weakness and slurred speech, found to have acute infarct of right lenticular nucleus/corona radiata region.     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 Discussed with patient  - PT/OT/SLP: total of 3 hrs/day 5 days/week     #HTN  - continue Lisinopril 5mg daily  - Continue to monitor  - controlled    #T2DM with hyperglycemia  --FS controlled  - Cont. Lantus 28 units at bedtime ( increased 11/22)   - continue Lispro 7u before meals  - ISS, - monitor fingersticks  - Spoke with nutritionist regarding education of patient and family on diabetic diet    #Mood  - continue Prozac  - neuropsych consult for mood, coping.     #Pain  - Continue Gabapentin 100mg tid  - Tylenol PRN    #GI  - senna, miralax  - Protonix    #:  - voiding, PVR =0    #Diet  - Dysphagia 3 Soft - Thin liquids  - Consistent carb, low sodium    #DVT ppx  - Lovenox    #Team meeting 11/19  Nursing: Continent B/B  SW: lives in  with spouse, daughter, and son in law, prior to admission independent PTA  OT: set up/supervision for eating and grooming, min A UBD, mod A LBD, stand pivot transfers to commode, max A bathing  PT: Mod A transfers, Amb. 25 Ft HR,  ST: Soft diet/thins, Mild high level cognitive deficits, Left inattention improved.  Vital stim. Perceptive deficits, memory and problem solving difficulties  MARLO: 12/3

## 2020-11-23 NOTE — PROGRESS NOTE ADULT - SUBJECTIVE AND OBJECTIVE BOX
HPI:  Pt is a 64 y/o F with PMHx of T2DM, who presented to [facility] on 11/8/20 with acute onset of left sided weakness and slurred speech. Pt was outside of window for tPA. On admission, her BP was 202/109. CT head showed chronic ischemic changes, no acute hemorrhage, or ischemic penumbra. Moderate stenosis of the ICA origin due to calcified and noncalcified atheromatous plaque. MRI showed acute infarct in the right lenticular nucleus/corona radiata region. She wsa evaluated by PM&R and PT and was recommended for acute rehab. She was deemed stable for discharge on 11/12/20 and was transferred to [facility] for acute inpatient rehabilitation.  (12 Nov 2020 15:51)      PAST MEDICAL & SURGICAL HISTORY:  GERD (gastroesophageal reflux disease)    DM (diabetes mellitus)    No significant past surgical history        Subjective:  Spoke to pt. via Russian Creole .  No new complaints      VITALS  Vital Signs Last 24 Hrs  T(C): 37.2 (23 Nov 2020 09:15), Max: 37.2 (23 Nov 2020 09:15)  T(F): 98.9 (23 Nov 2020 09:15), Max: 98.9 (23 Nov 2020 09:15)  HR: 90 (23 Nov 2020 09:15) (71 - 90)  BP: 131/68 (23 Nov 2020 09:15) (131/68 - 144/72)  BP(mean): --  RR: 15 (23 Nov 2020 09:15) (15 - 16)  SpO2: 98% (23 Nov 2020 09:15) (98% - 99%)    REVIEW OF SYMPTOMS  CONSTITUTIONAL: No fevers or chills    RESPIRATORY: No cough, wheezing, or shortness of breath  CARDIOVASCULAR: No chest pain or palpitations  GASTROINTESTINAL: No abdominal or epigastric pain. No nausea or vomiting. No diarrhea or constipation.   GENITOURINARY: No dysuria, frequency or hematuria  NEUROLOGICAL: No numbness, + Left sided weakness  SKIN: No itching, rashes        PHYSICAL EXAM:    General: NAD, yes/no reliable for simple personal information. follows simple commands  HEENT: NC/AT; PERRL, anicteric sclera  Cardiovascular: +S1/S2, regular rate and rhythm  Respiratory: CTA B/L; no wheezes, no crackles  Gastrointestinal: soft, NT/ND; bowel sounds intact x4  Extremities: warm, well perfused; no edema, clubbing or cyanosis      Neurological: AAOx3; Motor exam with Left sided hemiparesis--    RECENT LABS                        10.9   9.78  )-----------( 303      ( 23 Nov 2020 06:54 )             36.1     11-23    139  |  104  |  17  ----------------------------<  172<H>  4.4   |  27  |  1.12    Ca    9.3      23 Nov 2020 06:54    TPro  8.2  /  Alb  3.3  /  TBili  0.3  /  DBili  0.1  /  AST  18  /  ALT  27  /  AlkPhos  88  11-23            RADIOLOGY/OTHER RESULTS      MEDICATIONS  (STANDING):  aspirin  chewable 81 milliGRAM(s) Oral daily  atorvastatin 80 milliGRAM(s) Oral at bedtime  dextrose 40% Gel 15 Gram(s) Oral once  dextrose 5%. 1000 milliLiter(s) (50 mL/Hr) IV Continuous <Continuous>  dextrose 5%. 1000 milliLiter(s) (100 mL/Hr) IV Continuous <Continuous>  dextrose 50% Injectable 25 Gram(s) IV Push once  dextrose 50% Injectable 12.5 Gram(s) IV Push once  dextrose 50% Injectable 25 Gram(s) IV Push once  enoxaparin Injectable 40 milliGRAM(s) SubCutaneous daily  FLUoxetine 20 milliGRAM(s) Oral daily  gabapentin 100 milliGRAM(s) Oral three times a day  glucagon  Injectable 1 milliGRAM(s) IntraMuscular once  insulin glargine Injectable (LANTUS) 28 Unit(s) SubCutaneous at bedtime  insulin lispro (ADMELOG) corrective regimen sliding scale   SubCutaneous three times a day before meals  insulin lispro (ADMELOG) corrective regimen sliding scale   SubCutaneous at bedtime  insulin lispro Injectable (ADMELOG) 7 Unit(s) SubCutaneous three times a day before meals  lisinopril 5 milliGRAM(s) Oral daily  melatonin 6 milliGRAM(s) Oral at bedtime  pantoprazole    Tablet 40 milliGRAM(s) Oral before breakfast  polyethylene glycol 3350 17 Gram(s) Oral two times a day  senna 2 Tablet(s) Oral at bedtime    MEDICATIONS  (PRN):  acetaminophen   Tablet .. 650 milliGRAM(s) Oral every 6 hours PRN Mild Pain (1 - 3), Moderate Pain (4 - 6), Severe Pain (7 - 10)

## 2020-11-23 NOTE — PROGRESS NOTE ADULT - ASSESSMENT
Pt is a 66 y/o F with PMHx of T2DM, who presented to [facility] on 11/8/20 with acute onset of left sided weakness and slurred speech, found to have acute infarct of right lenticular nucleus/corona radiata region. Pt with functional deficits with ADLs, ambulation, and expression.    #CVA  - acute right lenticular nucleus and corona radiata  - continue comprehensive acute rehabilitation program PT/OT/SLP  - continue aspirin 81mg  - continue Lipitor  - Prozac for motor recovery    #Essential HTN  - continue Lisinopril 5mg daily    #T2DM  - A1c 7.9  - Continue lantus to 28 units QHS  - continue Lispro 7u before meals  - ISS  - monitor fingersticks  - consistent carb diet    VTE ppx  - continue Lovenox

## 2020-11-23 NOTE — PROGRESS NOTE ADULT - SUBJECTIVE AND OBJECTIVE BOX
Patient is a 65y old  Female who presents with a chief complaint of 01.1: Left Body Involvement (22 Nov 2020 11:44)      SUBJECTIVE / OVERNIGHT EVENTS:  Creole  Alan ID# [id number]  Pt seen and examined at bedside. No acute events overnight.  Pt denies cp, palpitations, sob, abd pain, N/V, fever, chills.    ROS:  All other review of systems negative    Allergies    No Known Allergies    Intolerances        MEDICATIONS  (STANDING):  aspirin  chewable 81 milliGRAM(s) Oral daily  atorvastatin 80 milliGRAM(s) Oral at bedtime  dextrose 40% Gel 15 Gram(s) Oral once  dextrose 5%. 1000 milliLiter(s) (50 mL/Hr) IV Continuous <Continuous>  dextrose 5%. 1000 milliLiter(s) (100 mL/Hr) IV Continuous <Continuous>  dextrose 50% Injectable 25 Gram(s) IV Push once  dextrose 50% Injectable 12.5 Gram(s) IV Push once  dextrose 50% Injectable 25 Gram(s) IV Push once  enoxaparin Injectable 40 milliGRAM(s) SubCutaneous daily  FLUoxetine 20 milliGRAM(s) Oral daily  gabapentin 100 milliGRAM(s) Oral three times a day  glucagon  Injectable 1 milliGRAM(s) IntraMuscular once  insulin glargine Injectable (LANTUS) 28 Unit(s) SubCutaneous at bedtime  insulin lispro (ADMELOG) corrective regimen sliding scale   SubCutaneous three times a day before meals  insulin lispro (ADMELOG) corrective regimen sliding scale   SubCutaneous at bedtime  insulin lispro Injectable (ADMELOG) 7 Unit(s) SubCutaneous three times a day before meals  lisinopril 5 milliGRAM(s) Oral daily  melatonin 6 milliGRAM(s) Oral at bedtime  pantoprazole    Tablet 40 milliGRAM(s) Oral before breakfast  polyethylene glycol 3350 17 Gram(s) Oral two times a day  senna 2 Tablet(s) Oral at bedtime    MEDICATIONS  (PRN):  acetaminophen   Tablet .. 650 milliGRAM(s) Oral every 6 hours PRN Mild Pain (1 - 3), Moderate Pain (4 - 6), Severe Pain (7 - 10)      Vital Signs Last 24 Hrs  T(C): 37.2 (23 Nov 2020 09:15), Max: 37.2 (23 Nov 2020 09:15)  T(F): 98.9 (23 Nov 2020 09:15), Max: 98.9 (23 Nov 2020 09:15)  HR: 90 (23 Nov 2020 09:15) (71 - 90)  BP: 131/68 (23 Nov 2020 09:15) (131/68 - 144/72)  BP(mean): --  RR: 15 (23 Nov 2020 09:15) (15 - 16)  SpO2: 98% (23 Nov 2020 09:15) (98% - 99%)  CAPILLARY BLOOD GLUCOSE      POCT Blood Glucose.: 152 mg/dL (23 Nov 2020 11:28)  POCT Blood Glucose.: 194 mg/dL (23 Nov 2020 08:10)  POCT Blood Glucose.: 193 mg/dL (22 Nov 2020 22:02)  POCT Blood Glucose.: 213 mg/dL (22 Nov 2020 17:19)    I&O's Summary      PHYSICAL EXAM:  GENERAL: NAD, Overweight female  CHEST/LUNG: Clear to auscultation bilaterally; No wheeze  HEART: Regular rate and rhythm; No murmurs, rubs, or gallops  ABDOMEN: Soft, Nontender, Nondistended; Bowel sounds present  EXTREMITIES:  2+ Peripheral Pulses, No clubbing, cyanosis, or edema  NEUROLOGY: AAOx3 Left sided hemiparesis  PSYCH: calm    LABS:                        10.9   9.78  )-----------( 303      ( 23 Nov 2020 06:54 )             36.1     11-23    139  |  104  |  17  ----------------------------<  172<H>  4.4   |  27  |  1.12    Ca    9.3      23 Nov 2020 06:54    TPro  8.2  /  Alb  3.3  /  TBili  0.3  /  DBili  0.1  /  AST  18  /  ALT  27  /  AlkPhos  88  11-23              RADIOLOGY & ADDITIONAL TESTS:  Results Reviewed:   Imaging Personally Reviewed:  Electrocardiogram Personally Reviewed:    COORDINATION OF CARE:  Care Discussed with Consultants/Other Providers [Y/N]:  Prior or Outpatient Records Reviewed [Y/N]:

## 2020-11-24 ENCOUNTER — TRANSCRIPTION ENCOUNTER (OUTPATIENT)
Age: 65
End: 2020-11-24

## 2020-11-24 LAB
GLUCOSE BLDC GLUCOMTR-MCNC: 151 MG/DL — HIGH (ref 70–99)
GLUCOSE BLDC GLUCOMTR-MCNC: 177 MG/DL — HIGH (ref 70–99)
GLUCOSE BLDC GLUCOMTR-MCNC: 205 MG/DL — HIGH (ref 70–99)
GLUCOSE BLDC GLUCOMTR-MCNC: 79 MG/DL — SIGNIFICANT CHANGE UP (ref 70–99)

## 2020-11-24 PROCEDURE — 99232 SBSQ HOSP IP/OBS MODERATE 35: CPT

## 2020-11-24 RX ORDER — ASPIRIN/CALCIUM CARB/MAGNESIUM 324 MG
1 TABLET ORAL
Qty: 30 | Refills: 0
Start: 2020-11-24 | End: 2020-12-23

## 2020-11-24 RX ORDER — LISINOPRIL 2.5 MG/1
1 TABLET ORAL
Qty: 30 | Refills: 0
Start: 2020-11-24 | End: 2020-12-23

## 2020-11-24 RX ORDER — ATORVASTATIN CALCIUM 80 MG/1
1 TABLET, FILM COATED ORAL
Qty: 30 | Refills: 0
Start: 2020-11-24 | End: 2020-12-23

## 2020-11-24 RX ORDER — GABAPENTIN 400 MG/1
1 CAPSULE ORAL
Qty: 90 | Refills: 0
Start: 2020-11-24 | End: 2020-12-23

## 2020-11-24 RX ORDER — ENOXAPARIN SODIUM 100 MG/ML
28 INJECTION SUBCUTANEOUS
Qty: 15 | Refills: 0
Start: 2020-11-24 | End: 2020-12-23

## 2020-11-24 RX ORDER — FLUOXETINE HCL 10 MG
1 CAPSULE ORAL
Qty: 30 | Refills: 0
Start: 2020-11-24 | End: 2020-12-23

## 2020-11-24 RX ORDER — INSULIN LISPRO 100/ML
7 VIAL (ML) SUBCUTANEOUS
Qty: 15 | Refills: 0
Start: 2020-11-24 | End: 2020-12-23

## 2020-11-24 RX ADMIN — Medication 6 MILLIGRAM(S): at 21:20

## 2020-11-24 RX ADMIN — Medication 1: at 08:14

## 2020-11-24 RX ADMIN — PANTOPRAZOLE SODIUM 40 MILLIGRAM(S): 20 TABLET, DELAYED RELEASE ORAL at 06:28

## 2020-11-24 RX ADMIN — Medication 7 UNIT(S): at 16:47

## 2020-11-24 RX ADMIN — ENOXAPARIN SODIUM 40 MILLIGRAM(S): 100 INJECTION SUBCUTANEOUS at 12:21

## 2020-11-24 RX ADMIN — ATORVASTATIN CALCIUM 80 MILLIGRAM(S): 80 TABLET, FILM COATED ORAL at 21:20

## 2020-11-24 RX ADMIN — Medication 20 MILLIGRAM(S): at 12:21

## 2020-11-24 RX ADMIN — POLYETHYLENE GLYCOL 3350 17 GRAM(S): 17 POWDER, FOR SOLUTION ORAL at 06:27

## 2020-11-24 RX ADMIN — Medication 1: at 16:47

## 2020-11-24 RX ADMIN — INSULIN GLARGINE 28 UNIT(S): 100 INJECTION, SOLUTION SUBCUTANEOUS at 21:21

## 2020-11-24 RX ADMIN — Medication 7 UNIT(S): at 08:13

## 2020-11-24 RX ADMIN — Medication 2: at 12:21

## 2020-11-24 RX ADMIN — Medication 7 UNIT(S): at 12:21

## 2020-11-24 RX ADMIN — Medication 81 MILLIGRAM(S): at 12:21

## 2020-11-24 RX ADMIN — LISINOPRIL 5 MILLIGRAM(S): 2.5 TABLET ORAL at 06:28

## 2020-11-24 RX ADMIN — GABAPENTIN 100 MILLIGRAM(S): 400 CAPSULE ORAL at 21:20

## 2020-11-24 RX ADMIN — GABAPENTIN 100 MILLIGRAM(S): 400 CAPSULE ORAL at 06:28

## 2020-11-24 RX ADMIN — SENNA PLUS 2 TABLET(S): 8.6 TABLET ORAL at 21:20

## 2020-11-24 RX ADMIN — GABAPENTIN 100 MILLIGRAM(S): 400 CAPSULE ORAL at 13:01

## 2020-11-24 NOTE — DISCHARGE NOTE PROVIDER - HOSPITAL COURSE
Pt is a 64 y/o F with PMHx of T2DM, who presented to [facility] on 11/8/20 with acute onset of left sided weakness and slurred speech. Pt was outside of window for tPA. On admission, her BP was 202/109. CT head showed chronic ischemic changes, no acute hemorrhage, or ischemic penumbra. Moderate stenosis of the ICA origin due to calcified and noncalcified atheromatous plaque. MRI showed acute infarct in the right lenticular nucleus/corona radiata region. She was evaluated by PM&R and PT and was recommended for acute rehab. She was deemed stable for discharge on 11/12/20 and was transferred to [facility] for acute inpatient rehabilitation.     At [place], pt did very well and made progress with therapy. Her course was complicated by episodes of hyperglycemia for which her insulin regimen was adjusted by the hospitalist. In OT, she was a set up/supervision for eating and grooming, min assist for upper body dressing, mod assist for lower body dressing and stand pivot transfers to the commode, and max assist for bathing. In PT, she was mod assist for transfers and ambulated 25 feet with hand rail. In ST, she was on a regular with thins diet, and had mild higher level cognitive deficits, improving left inattention, and perceptive deficits with memory and problem solving difficulties.     Pt will need to follow up with neurology, PM&R, and her primary care physician for continued management and work up.     Pt is a 64 y/o F with PMHx of T2DM, who presented to [facility] on 11/8/20 with acute onset of left sided weakness and slurred speech. Pt was outside of window for tPA. On admission, her BP was 202/109. CT head showed chronic ischemic changes, no acute hemorrhage, or ischemic penumbra. Moderate stenosis of the ICA origin due to calcified and noncalcified atheromatous plaque. MRI showed acute infarct in the right lenticular nucleus/corona radiata region. She was evaluated by PM&R and PT and was recommended for acute rehab. She was deemed stable for discharge on 11/12/20 and was transferred to [facility] for acute inpatient rehabilitation.     At [place], pt did very well and made progress with therapy. Her course was complicated by episodes of hyperglycemia for which her insulin regimen was adjusted by the hospitalist. In OT, she was a set up/supervision for eating and grooming, min assist for upper body dressing, mod assist for lower body dressing and stand pivot transfers to the commode, and max assist for bathing. In PT, she was mod assist for transfers and ambulated 25 feet with hand rail. In ST, she was on a regular with thins diet, and had mild higher level cognitive deficits, improving left inattention, and perceptive deficits with memory and problem solving difficulties. She is being discharged home at a Wheelchair level.      Pt will need to follow up with neurology, PM&R, and her primary care physician for continued management and work up.

## 2020-11-24 NOTE — DISCHARGE NOTE PROVIDER - CARE PROVIDERS DIRECT ADDRESSES
,DirectAddress_Unknown,[email or web address]

## 2020-11-24 NOTE — PROGRESS NOTE ADULT - SUBJECTIVE AND OBJECTIVE BOX
HPI:  Pt is a 64 y/o F with PMHx of T2DM, who presented to [facility] on 11/8/20 with acute onset of left sided weakness and slurred speech. Pt was outside of window for tPA. On admission, her BP was 202/109. CT head showed chronic ischemic changes, no acute hemorrhage, or ischemic penumbra. Moderate stenosis of the ICA origin due to calcified and noncalcified atheromatous plaque. MRI showed acute infarct in the right lenticular nucleus/corona radiata region. She wsa evaluated by PM&R and PT and was recommended for acute rehab. She was deemed stable for discharge on 11/12/20 and was transferred to [facility] for acute inpatient rehabilitation.  (12 Nov 2020 15:51)    SUBJECTIVE / INTERVAL HPI: Patient seen and examined at bedside.     REVIEW OF SYSTEMS:    CONSTITUTIONAL: No weakness, fevers or chills  EYES/ENT: No visual changes;  No vertigo or throat pain   NECK: No pain or stiffness  RESPIRATORY: No cough, wheezing, or shortness of breath  CARDIOVASCULAR: No chest pain or palpitations  GASTROINTESTINAL: No abdominal or epigastric pain. No nausea or vomiting. No diarrhea or constipation.   GENITOURINARY: No dysuria, frequency or hematuria  NEUROLOGICAL: No numbness or weakness  SKIN: No itching, rashes      VITAL SIGNS:  Vital Signs Last 24 Hrs  T(C): 37 (24 Nov 2020 07:45), Max: 37.2 (23 Nov 2020 09:15)  T(F): 98.6 (24 Nov 2020 07:45), Max: 98.9 (23 Nov 2020 09:15)  HR: 79 (24 Nov 2020 07:45) (68 - 90)  BP: 130/70 (24 Nov 2020 07:45) (122/72 - 131/68)  BP(mean): --  RR: 14 (24 Nov 2020 07:45) (14 - 15)  SpO2: 97% (24 Nov 2020 07:45) (97% - 98%)    PHYSICAL EXAM:      MEDICATIONS:  MEDICATIONS  (STANDING):  aspirin  chewable 81 milliGRAM(s) Oral daily  atorvastatin 80 milliGRAM(s) Oral at bedtime  dextrose 40% Gel 15 Gram(s) Oral once  dextrose 5%. 1000 milliLiter(s) (50 mL/Hr) IV Continuous <Continuous>  dextrose 5%. 1000 milliLiter(s) (100 mL/Hr) IV Continuous <Continuous>  dextrose 50% Injectable 25 Gram(s) IV Push once  dextrose 50% Injectable 12.5 Gram(s) IV Push once  dextrose 50% Injectable 25 Gram(s) IV Push once  enoxaparin Injectable 40 milliGRAM(s) SubCutaneous daily  FLUoxetine 20 milliGRAM(s) Oral daily  gabapentin 100 milliGRAM(s) Oral three times a day  glucagon  Injectable 1 milliGRAM(s) IntraMuscular once  insulin glargine Injectable (LANTUS) 28 Unit(s) SubCutaneous at bedtime  insulin lispro (ADMELOG) corrective regimen sliding scale   SubCutaneous three times a day before meals  insulin lispro (ADMELOG) corrective regimen sliding scale   SubCutaneous at bedtime  insulin lispro Injectable (ADMELOG) 7 Unit(s) SubCutaneous three times a day before meals  lisinopril 5 milliGRAM(s) Oral daily  melatonin 6 milliGRAM(s) Oral at bedtime  pantoprazole    Tablet 40 milliGRAM(s) Oral before breakfast  polyethylene glycol 3350 17 Gram(s) Oral two times a day  senna 2 Tablet(s) Oral at bedtime    MEDICATIONS  (PRN):  acetaminophen   Tablet .. 650 milliGRAM(s) Oral every 6 hours PRN Mild Pain (1 - 3), Moderate Pain (4 - 6), Severe Pain (7 - 10)      ALLERGIES:  Allergies    No Known Allergies    Intolerances        LABS:                        10.9   9.78  )-----------( 303      ( 23 Nov 2020 06:54 )             36.1     11-23    139  |  104  |  17  ----------------------------<  172<H>  4.4   |  27  |  1.12    Ca    9.3      23 Nov 2020 06:54    TPro  8.2  /  Alb  3.3  /  TBili  0.3  /  DBili  0.1  /  AST  18  /  ALT  27  /  AlkPhos  88  11-23        CAPILLARY BLOOD GLUCOSE      POCT Blood Glucose.: 136 mg/dL (23 Nov 2020 21:03)      RADIOLOGY & ADDITIONAL TESTS: Reviewed. HPI:  Pt is a 66 y/o F with PMHx of T2DM, who presented to [facility] on 11/8/20 with acute onset of left sided weakness and slurred speech. Pt was outside of window for tPA. On admission, her BP was 202/109. CT head showed chronic ischemic changes, no acute hemorrhage, or ischemic penumbra. Moderate stenosis of the ICA origin due to calcified and noncalcified atheromatous plaque. MRI showed acute infarct in the right lenticular nucleus/corona radiata region. She wsa evaluated by PM&R and PT and was recommended for acute rehab. She was deemed stable for discharge on 11/12/20 and was transferred to [facility] for acute inpatient rehabilitation.  (12 Nov 2020 15:51)    SUBJECTIVE / INTERVAL HPI: Patient seen and examined at bedside. Creole  [id number] used. Pt with no complaints. She slept well overnight and is making improvements in therapy with her strength and movement of her left side.     REVIEW OF SYSTEMS:    CONSTITUTIONAL: No fevers or chills  EYES/ENT: No visual changes;  No vertigo or throat pain   NECK: No pain or stiffness  RESPIRATORY: No cough, wheezing, or shortness of breath  CARDIOVASCULAR: No chest pain or palpitations  GASTROINTESTINAL: No abdominal or epigastric pain. No nausea or vomiting. No diarrhea or constipation.   GENITOURINARY: No dysuria, frequency or hematuria  NEUROLOGICAL: No numbness, + weakness left sided  SKIN: No itching, rashes      VITAL SIGNS:  Vital Signs Last 24 Hrs  T(C): 37 (24 Nov 2020 07:45), Max: 37.2 (23 Nov 2020 09:15)  T(F): 98.6 (24 Nov 2020 07:45), Max: 98.9 (23 Nov 2020 09:15)  HR: 79 (24 Nov 2020 07:45) (68 - 90)  BP: 130/70 (24 Nov 2020 07:45) (122/72 - 131/68)  BP(mean): --  RR: 14 (24 Nov 2020 07:45) (14 - 15)  SpO2: 97% (24 Nov 2020 07:45) (97% - 98%)    PHYSICAL EXAM:  General: NAD, sitting in chair comfortably  HEENT: NC/AT; PERRL, anicteric sclera; MMM  Neck: supple  Cardiovascular: +S1/S2, RRR  Respiratory: CTA B/L; no W/R/R, no crackles  Gastrointestinal: soft, NT/ND; normoactive bowel sounds  Extremities: warm, well perfused; no edema, clubbing or cyanosis  Neurological: AAOx3; Motor left sided hemiparesis stable, no sensory deficit     MEDICATIONS:  MEDICATIONS  (STANDING):  aspirin  chewable 81 milliGRAM(s) Oral daily  atorvastatin 80 milliGRAM(s) Oral at bedtime  dextrose 40% Gel 15 Gram(s) Oral once  dextrose 5%. 1000 milliLiter(s) (50 mL/Hr) IV Continuous <Continuous>  dextrose 5%. 1000 milliLiter(s) (100 mL/Hr) IV Continuous <Continuous>  dextrose 50% Injectable 25 Gram(s) IV Push once  dextrose 50% Injectable 12.5 Gram(s) IV Push once  dextrose 50% Injectable 25 Gram(s) IV Push once  enoxaparin Injectable 40 milliGRAM(s) SubCutaneous daily  FLUoxetine 20 milliGRAM(s) Oral daily  gabapentin 100 milliGRAM(s) Oral three times a day  glucagon  Injectable 1 milliGRAM(s) IntraMuscular once  insulin glargine Injectable (LANTUS) 28 Unit(s) SubCutaneous at bedtime  insulin lispro (ADMELOG) corrective regimen sliding scale   SubCutaneous three times a day before meals  insulin lispro (ADMELOG) corrective regimen sliding scale   SubCutaneous at bedtime  insulin lispro Injectable (ADMELOG) 7 Unit(s) SubCutaneous three times a day before meals  lisinopril 5 milliGRAM(s) Oral daily  melatonin 6 milliGRAM(s) Oral at bedtime  pantoprazole    Tablet 40 milliGRAM(s) Oral before breakfast  polyethylene glycol 3350 17 Gram(s) Oral two times a day  senna 2 Tablet(s) Oral at bedtime    MEDICATIONS  (PRN):  acetaminophen   Tablet .. 650 milliGRAM(s) Oral every 6 hours PRN Mild Pain (1 - 3), Moderate Pain (4 - 6), Severe Pain (7 - 10)      ALLERGIES:  Allergies    No Known Allergies    Intolerances        LABS:                        10.9   9.78  )-----------( 303      ( 23 Nov 2020 06:54 )             36.1     11-23    139  |  104  |  17  ----------------------------<  172<H>  4.4   |  27  |  1.12    Ca    9.3      23 Nov 2020 06:54    TPro  8.2  /  Alb  3.3  /  TBili  0.3  /  DBili  0.1  /  AST  18  /  ALT  27  /  AlkPhos  88  11-23        CAPILLARY BLOOD GLUCOSE      POCT Blood Glucose.: 136 mg/dL (23 Nov 2020 21:03)      RADIOLOGY & ADDITIONAL TESTS: Reviewed.

## 2020-11-24 NOTE — DISCHARGE NOTE PROVIDER - CARE PROVIDER_API CALL
Jani Cormier  INTERNAL MEDICINE  [address] [place] [address]  Phone: [phone]  Fax: [phone]  Follow Up Time:     Nancy Palacios  BRAIN INJURY MEDICINE  [address] [place] [address]  Phone: [phone]  Fax: [phone]  Follow Up Time:     Roland Del Valle  NEUROLOGY  [address] [place] [address], [facility] [address]  [place] [address]  Phone: [phone]  Fax: [phone]  Follow Up Time:

## 2020-11-24 NOTE — PROGRESS NOTE ADULT - ATTENDING COMMENTS
I have personally interviewed and examined this patient, reviewed pertinent clinical information, and performed the evaluation and management services provided at today's visit for inpatient medical follow up    I am available to discuss any issues related to the medical care of this patient on the unit, or by phone at [phone]
I have personally interviewed and examined this patient, reviewed pertinent clinical information, and performed the evaluation and management services provided at today's visit for inpatient medical follow up    I am available to discuss any issues related to the medical care of this patient on the unit, or by phone at [phone]
Pt. seen with resident.  Agree with documentation above as per resident with amendments made as appropriate. Patient medically stable. Making progress towards rehab goals.
Pt. seen with resident.  Agree with documentation above as per resident with amendments made as appropriate. Patient medically stable. Making progress towards rehab goals.     d/c planning home tomorrow

## 2020-11-24 NOTE — DISCHARGE NOTE PROVIDER - NSDCMRMEDTOKEN_GEN_ALL_CORE_FT
aspirin 81 mg oral tablet, chewable: 1 tab(s) chewed once a day   atorvastatin 80 mg oral tablet: 1 tab(s) orally once a day (at bedtime)  FLUoxetine 20 mg oral capsule: 1 cap(s) orally once a day  gabapentin 100 mg oral capsule: 1 cap(s) orally 3 times a day  insulin lispro 100 units/mL injectable solution: 7 unit(s) injectable 3 times a day (before meals)  Lantus Solostar Pen 100 units/mL subcutaneous solution: 28 unit(s) subcutaneous once a day (at bedtime)   lisinopril 5 mg oral tablet: 1 tab(s) orally once a day  pantoprazole 40 mg oral delayed release tablet: 1 tab(s) orally once a day (before a meal)

## 2020-11-24 NOTE — DISCHARGE NOTE PROVIDER - NSDCCPCAREPLAN_GEN_ALL_CORE_FT
PRINCIPAL DISCHARGE DIAGNOSIS  Diagnosis: Cerebrovascular accident (CVA)  Assessment and Plan of Treatment: You originally presented to the hospital with left sided weakness and were found to have a right sided stroke. You came to [facility] for acute rehab and did well. You will need to continue on the medications that were started, including the Aspirin, Lipitor, and Prozac. You will continue to have therapy at home. You will need to follow up with neurology and with PM&R for continued care and management.      SECONDARY DISCHARGE DIAGNOSES  Diagnosis: Type 2 diabetes mellitus  Assessment and Plan of Treatment: You have diabetes and your sugars were high during your stay. You will need to continue on insulin. You should take Lantus 28 units at night and Lispro 7 units before meals. You will need to follow up with your primary care physician or endocrinologist for continued care.

## 2020-11-24 NOTE — PROGRESS NOTE ADULT - ASSESSMENT
64 y/o F with PMHx of T2DM, who presented to [facility] on 11/8/20 with acute onset of left sided weakness and slurred speech, found to have acute infarct of right lenticular nucleus/corona radiata region.     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 Discussed with patient  - PT/OT/SLP: total of 3 hrs/day 5 days/week     #HTN  - continue Lisinopril 5mg daily  - Continue to monitor  - controlled    #T2DM with hyperglycemia  - FS controlled  - Cont. Lantus 28 units at bedtime (increased 11/22)   - continue Lispro 7u before meals  - ISS, - monitor fingersticks  - Spoke with nutritionist regarding education of patient and family on diabetic diet    #Mood  - continue Prozac  - neuropsych consult for mood, coping.     #Pain  - Continue Gabapentin 100mg tid  - Tylenol PRN    #GI  - senna, miralax  - Protonix    #:  - voiding, PVR =0    #Diet  - Regular - Thin liquids  - Consistent carb, low sodium    #DVT ppx  - Lovenox    #Team meeting 11/19  Nursing: Continent B/B  SW: lives in  with spouse, daughter, and son in law, prior to admission independent PTA  OT: set up/supervision for eating and grooming, min A UBD, mod A LBD, stand pivot transfers to commode, max A bathing  PT: Mod A transfers, Amb. 25 Ft HR,  ST: Soft diet/thins, Mild high level cognitive deficits, Left inattention improved.  Vital stim. Perceptive deficits, memory and problem solving difficulties  MARLO: 11/25

## 2020-11-24 NOTE — DISCHARGE NOTE PROVIDER - PROVIDER TOKENS
PROVIDER:[TOKEN:[[id number]]],PROVIDER:[TOKEN:[[id number]]],PROVIDER:[TOKEN:[[id number]]]

## 2020-11-24 NOTE — DISCHARGE NOTE PROVIDER - NSDCFUADDINST_GEN_ALL_CORE_FT
Increase activity as tolerated Increase activity as tolerated.  Follow up with Rehabilitation therapy in [facility]

## 2020-11-25 ENCOUNTER — TRANSCRIPTION ENCOUNTER (OUTPATIENT)
Age: 65
End: 2020-11-25

## 2020-11-25 VITALS
TEMPERATURE: 98 F | RESPIRATION RATE: 15 BRPM | HEART RATE: 73 BPM | SYSTOLIC BLOOD PRESSURE: 122 MMHG | DIASTOLIC BLOOD PRESSURE: 65 MMHG | OXYGEN SATURATION: 97 %

## 2020-11-25 LAB — GLUCOSE BLDC GLUCOMTR-MCNC: 173 MG/DL — HIGH (ref 70–99)

## 2020-11-25 PROCEDURE — 97535 SELF CARE MNGMENT TRAINING: CPT

## 2020-11-25 PROCEDURE — 80053 COMPREHEN METABOLIC PANEL: CPT

## 2020-11-25 PROCEDURE — 81001 URINALYSIS AUTO W/SCOPE: CPT

## 2020-11-25 PROCEDURE — 0225U NFCT DS DNA&RNA 21 SARSCOV2: CPT

## 2020-11-25 PROCEDURE — 92523 SPEECH SOUND LANG COMPREHEN: CPT

## 2020-11-25 PROCEDURE — 36415 COLL VENOUS BLD VENIPUNCTURE: CPT

## 2020-11-25 PROCEDURE — 97167 OT EVAL HIGH COMPLEX 60 MIN: CPT

## 2020-11-25 PROCEDURE — 92526 ORAL FUNCTION THERAPY: CPT

## 2020-11-25 PROCEDURE — 92507 TX SP LANG VOICE COMM INDIV: CPT

## 2020-11-25 PROCEDURE — 97530 THERAPEUTIC ACTIVITIES: CPT

## 2020-11-25 PROCEDURE — 97110 THERAPEUTIC EXERCISES: CPT

## 2020-11-25 PROCEDURE — 99238 HOSP IP/OBS DSCHRG MGMT 30/<: CPT

## 2020-11-25 PROCEDURE — 85027 COMPLETE CBC AUTOMATED: CPT

## 2020-11-25 PROCEDURE — 82962 GLUCOSE BLOOD TEST: CPT

## 2020-11-25 PROCEDURE — 92610 EVALUATE SWALLOWING FUNCTION: CPT

## 2020-11-25 PROCEDURE — 80076 HEPATIC FUNCTION PANEL: CPT

## 2020-11-25 PROCEDURE — 97112 NEUROMUSCULAR REEDUCATION: CPT

## 2020-11-25 PROCEDURE — 87635 SARS-COV-2 COVID-19 AMP PRB: CPT

## 2020-11-25 PROCEDURE — 97116 GAIT TRAINING THERAPY: CPT

## 2020-11-25 PROCEDURE — 97163 PT EVAL HIGH COMPLEX 45 MIN: CPT

## 2020-11-25 PROCEDURE — 80048 BASIC METABOLIC PNL TOTAL CA: CPT

## 2020-11-25 RX ADMIN — Medication 7 UNIT(S): at 11:56

## 2020-11-25 RX ADMIN — LISINOPRIL 5 MILLIGRAM(S): 2.5 TABLET ORAL at 06:25

## 2020-11-25 RX ADMIN — PANTOPRAZOLE SODIUM 40 MILLIGRAM(S): 20 TABLET, DELAYED RELEASE ORAL at 06:25

## 2020-11-25 RX ADMIN — Medication 20 MILLIGRAM(S): at 11:17

## 2020-11-25 RX ADMIN — Medication 1: at 08:37

## 2020-11-25 RX ADMIN — Medication 1: at 11:56

## 2020-11-25 RX ADMIN — ENOXAPARIN SODIUM 40 MILLIGRAM(S): 100 INJECTION SUBCUTANEOUS at 11:17

## 2020-11-25 RX ADMIN — Medication 81 MILLIGRAM(S): at 11:17

## 2020-11-25 RX ADMIN — POLYETHYLENE GLYCOL 3350 17 GRAM(S): 17 POWDER, FOR SOLUTION ORAL at 06:25

## 2020-11-25 RX ADMIN — GABAPENTIN 100 MILLIGRAM(S): 400 CAPSULE ORAL at 12:41

## 2020-11-25 RX ADMIN — GABAPENTIN 100 MILLIGRAM(S): 400 CAPSULE ORAL at 06:25

## 2020-11-25 RX ADMIN — Medication 7 UNIT(S): at 08:36

## 2020-11-25 NOTE — PROGRESS NOTE ADULT - SUBJECTIVE AND OBJECTIVE BOX
HPI:  Pt is a 66 y/o F with PMHx of T2DM, who presented to [facility] on 11/8/20 with acute onset of left sided weakness and slurred speech. Pt was outside of window for tPA. On admission, her BP was 202/109. CT head showed chronic ischemic changes, no acute hemorrhage, or ischemic penumbra. Moderate stenosis of the ICA origin due to calcified and noncalcified atheromatous plaque. MRI showed acute infarct in the right lenticular nucleus/corona radiata region. She wsa evaluated by PM&R and PT and was recommended for acute rehab. She was deemed stable for discharge on 11/12/20 and was transferred to [facility] for acute inpatient rehabilitation.  (12 Nov 2020 15:51)      PAST MEDICAL & SURGICAL HISTORY:  GERD (gastroesophageal reflux disease)    DM (diabetes mellitus)    No significant past surgical history        Subjective:  Pt. reports she has not had a BM in 4 days.  Nursing states pt. had BM this AM.  No acute issues.  discharge home today.       VITALS  Vital Signs Last 24 Hrs  T(C): 36.8 (25 Nov 2020 07:59), Max: 36.8 (25 Nov 2020 07:59)  T(F): 98.3 (25 Nov 2020 07:59), Max: 98.3 (25 Nov 2020 07:59)  HR: 73 (25 Nov 2020 07:59) (68 - 76)  BP: 122/65 (25 Nov 2020 07:59) (122/65 - 128/66)  BP(mean): --  RR: 15 (25 Nov 2020 07:59) (14 - 15)  SpO2: 97% (25 Nov 2020 07:59) (97% - 98%)    REVIEW OF SYMPTOMS  CONSTITUTIONAL: No fevers or chills  EYES/ENT: No visual changes;  No vertigo or throat pain   NECK: No pain or stiffness  RESPIRATORY: No cough, wheezing, or shortness of breath  CARDIOVASCULAR: No chest pain or palpitations  GASTROINTESTINAL: No abdominal or epigastric pain. No nausea or vomiting. No diarrhea or constipation.   GENITOURINARY: No dysuria, frequency or hematuria  NEUROLOGICAL: No numbness, + weakness left sided  SKIN: No itching, rashes      PHYSICAL EXAM:  General: NAD, sitting in chair comfortably  HEENT: NC/AT; PERRL, anicteric sclera; MMM  Neck: supple  Cardiovascular: +S1/S2, RRR  Respiratory: CTA B/L; no W/R/R, no crackles  Gastrointestinal: soft, NT/ND; normoactive bowel sounds  Extremities: warm, well perfused; no edema, clubbing or cyanosis  Neurological: AAOx3; Motor left sided hemiparesis stable, no sensory deficit       RECENT LABS                  RADIOLOGY/OTHER RESULTS      MEDICATIONS  (STANDING):  aspirin  chewable 81 milliGRAM(s) Oral daily  atorvastatin 80 milliGRAM(s) Oral at bedtime  dextrose 40% Gel 15 Gram(s) Oral once  dextrose 5%. 1000 milliLiter(s) (50 mL/Hr) IV Continuous <Continuous>  dextrose 5%. 1000 milliLiter(s) (100 mL/Hr) IV Continuous <Continuous>  dextrose 50% Injectable 25 Gram(s) IV Push once  dextrose 50% Injectable 12.5 Gram(s) IV Push once  dextrose 50% Injectable 25 Gram(s) IV Push once  enoxaparin Injectable 40 milliGRAM(s) SubCutaneous daily  FLUoxetine 20 milliGRAM(s) Oral daily  gabapentin 100 milliGRAM(s) Oral three times a day  glucagon  Injectable 1 milliGRAM(s) IntraMuscular once  insulin glargine Injectable (LANTUS) 28 Unit(s) SubCutaneous at bedtime  insulin lispro (ADMELOG) corrective regimen sliding scale   SubCutaneous three times a day before meals  insulin lispro (ADMELOG) corrective regimen sliding scale   SubCutaneous at bedtime  insulin lispro Injectable (ADMELOG) 7 Unit(s) SubCutaneous three times a day before meals  lisinopril 5 milliGRAM(s) Oral daily  melatonin 6 milliGRAM(s) Oral at bedtime  pantoprazole    Tablet 40 milliGRAM(s) Oral before breakfast  polyethylene glycol 3350 17 Gram(s) Oral two times a day  senna 2 Tablet(s) Oral at bedtime    MEDICATIONS  (PRN):  acetaminophen   Tablet .. 650 milliGRAM(s) Oral every 6 hours PRN Mild Pain (1 - 3), Moderate Pain (4 - 6), Severe Pain (7 - 10)

## 2020-11-25 NOTE — PROGRESS NOTE ADULT - ASSESSMENT
64 y/o F with PMHx of T2DM, who presented to [facility] on 11/8/20 with acute onset of left sided weakness and slurred speech, found to have acute infarct of right lenticular nucleus/corona radiata region.     Patient medically stable for discharge to home today.  Discharge medications and instructions will be reviewed with pt's family.     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 Lantus 28 units at bedtime (increased 11/22)   - continue Lispro 7u before meals      #Mood  - continue Prozac      #Pain  - Continue Gabapentin 100mg tid  - Tylenol PRN    #GI  - Protonix    #:  - voiding, PVR =0    #Diet  - Regular - Thin liquids  - Consistent carb, low sodium    #DVT ppx  - Lovenox    #Team meeting 11/25  Nursing: Continent B/B  SW: lives in  with spouse, daughter, and son in law, prior to admission independent PTA  OT: set up/supervision for eating and grooming, min A UBD, mod A LBD, stand pivot transfers to commode, max A bathing  PT: Mod A transfers, Amb. 25 Ft HR with WC follow.  ST: Soft diet/thins, Mild high level cognitive deficits, Left inattention improved.  Vital stim. Perceptive deficits, memory and problem solving difficulties  MARLO: 11/25

## 2020-11-25 NOTE — PROGRESS NOTE ADULT - REASON FOR ADMISSION
01.1: Left Body Involvement

## 2020-11-25 NOTE — DISCHARGE NOTE NURSING/CASE MANAGEMENT/SOCIAL WORK - NSDCPEPT PROEDMA_GEN_ALL_CORE
OBSTETRIC HISTORY AND PHYSICAL EXAM    PRIMARY CERTIFIED NURSE MIDWIFE:  Anastacio Shepherd CNM    ADMITTING ATTENDING:  Nidia Lowe MD, MD   ADMITTING CERTIFIED NURSE MIDWIFE: Sarah Mcknight CNM    Chief Complaint   Patient presents with   • Pregnancy     contractions     HISTORY OF PRESENT ILLNESS:  Melba Miles is a 23 year old Black/  female, , estimated date of delivery 2017, by Ultrasound @ 38w4d who presents with contractions, nausea and vomiting.   Reports: contractions since 2200 and nausea with vomiting \"all day\".  Fetal movement present.     Active Hospital Problems    Diagnosis Date Noted   • Normal pregnancy in multigravida in third trimester 2017     GCT 17--72  Blood type B neg--Rhogam given at MLK 17  Normal hemoglobin electrophoresis.  HIV negative 17. HepBSAg neg 17, RPR NR 17, varicella immune, rubella immune.  Normal quad screen  BV 2/3/17       OB History    Para Term  AB Living   3 1 1 0 1 1   SAB TAB Ectopic Molar Multiple Live Births   0 1 0  0 1      # Outcome Date GA Lbr Jesús/2nd Weight Sex Delivery Anes PTL Lv   3 Current            2 Term 13 39w4d 10:27 / 00:10 3067 g U Vag-Spont IV ANALGESIC N ANABEL      Birth Comments: none   1 TAB                 CURRENT PREGNANCY:   Past Medical History:   Diagnosis Date   • BV (bacterial vaginosis)     Treated in 2013   • History of chlamydia     MICHELLE 9/3/13   • History of lead poisoning    • Learning disability    • Pseudoseizures 2015    after deaths in her family   • Seizures (CMS/HCC)    • Tinea versicolor    • Vaginal candidiasis      Past Surgical History:   Procedure Laterality Date   • MULTIPLE TOOTH EXTRACTIONS     • THERAPEUTIC          GYNECOLOGIC HISTORY:  Melba Miles has positive history of sexually transmitted diseases, Chlamydia. She has positive history of abnormal Pap smears.       SOCIAL HISTORY:  Social History   Substance Use Topics   •  Yes Smoking status: Never Smoker   • Smokeless tobacco: Never Used   • Alcohol use No       Sexually Active: Yes             Partners with: Male      Drug Use:    No              Review of patient's social economics indicates:  No social economics status on file      FAMILY HISTORY:  Family History   Problem Relation Age of Onset   • Hypertension Mother    • Diabetes Maternal Grandmother    • Hypertension Maternal Grandmother    • Cancer Maternal Grandfather      colon cancer       ACTIVE MEDICATIONS:  Outpatient Prescriptions Marked as Taking for the 7/30/17 encounter (Hospital Encounter)   Medication Sig Dispense Refill   • senna (SENOKOT) 8.6 MG tablet Take 1 tablet by mouth daily. 30 tablet 0   • Ferrous Sulfate (IRON) 325 (65 Fe) MG Tab Indications: Iron Deficiency     • Prenatal Vit-Fe Fumarate-FA (MULTIVITAMIN & MINERAL W/FOLIC ACID- PRENATAL) 27-1 MG Tab Take 1 tablet by mouth daily.         ALLERGIES:  ALLERGIES: no known allergies.    REVIEW OF SYSTEMS:    Eye Problem(s): negative  ENT Problem(s): negative  Cardiovascular problem(s): negative  Respiratory problem(s): negative  Gastrointestinal problem(s): nausea and vomiting     Genitourinary problem(s): negative  Musculoskeletal problem(s): negative  Integumentary problem(s): negative  Neurological problem(s): negative  Psychiatric problem(s): learning disability  Endocrine problem(s): negative  Hematologic and/or Lymphatic problem(s): negative    PHYSICAL EXAM  Visit Vitals  /69 (BP Location: E, Patient Position: Semi-Vázquez's)   Pulse 105   Temp 98.4 °F (36.9 °C) (Oral)   Resp 18   Ht 5' 3\" (1.6 m)   Wt 65.8 kg   LMP 11/26/2016 (Approximate)   BMI 25.69 kg/m²       General:   alert, appears stated age and cooperative,  female   Psychiatric:  Appropriate mood and affect   Skin:   normal   HEENT:  pupils equal, round and reactive to light   Lungs:   Respirations unlabored, even respirations   Heart:   Regular rate, normal perfusion    Abdomen:  Gravid, soft, non-tender   Extremities Normal   Fetal Surveillance/  Tocodynamometer: Fetal heart variability: moderate  Fetal Heart Rate decelerations: variable and late  Fetal Heart Rate accelerations: yes  Baseline FHR: 140 per minute  Uterine contractions: irregular, mild   Uterine Size: size equals dates   Presentation: cephalic by exam     Sterile Vaginal Exam:    Dilation 3 (17) cm   Effacement 70 (17)%   Station -2 (17)   Consistency Soft (17)   Position  Presentation    Vertex (17)        PRENATAL LABS:  Blood type:  B NEGATIVE      Rubella Antibody IgG (index)   Date Value   2017 7.70     HEP B Surface AG (no units)   Date Value   2017 Negative     Group B Streptococcus:   CULTURE   Date Value Ref Range Status   2017   Final    NEGATIVE FOR STREPTOCOCCUS AGALACTIAE (STREP GROUP B)   2017   Final    10,000 TO 50,000 CFU/mL MIXED BACTERIAL SONAM WITH NO PREDOMINATING TYPE     HGB (g/dL)   Date Value   2017 9.5 (L)   /  HCT (%)   Date Value   2017 28.9 (L)     Most Recent Pap Smear: No results found for: THINHPVRPT  First Trimester/Quad Screen: no results found  One hour glucose tolerance test: Normal    No results found for this or any previous visit.  IMAGING:    Date Gestational Age Presentation EFW % AC % MVP CL Placenta UAD BPP   7/11 35 6/7 Cephalic 19%  (5-7) 11% 5.5  Posterior         ASSESSMENT:   38w4d intrauterine pregnancy.  Patient Active Problem List   Diagnosis   • Learning disability   • BV (bacterial vaginosis)   • Pseudoseizures   • Normal pregnancy in multigravida in third trimester   •  contractions   • Threatened labor at term   • Vaginal discharge during pregnancy in third trimester      Fetal Heart Rate Interpretation: Category II (17)  Estimated Fetal Weight: 6- 6 1/2 pounds  Postpartum Hemorrhage Risk: Low (17)    PLAN:  Admitted as observation to Labor  and Delivery under Dr. Lowe/LUIZ.  Will plan for prolonged monitoring and IV fluids.  Will re-assess in 4-6 hours or PRN.     Sarah Mcknight CNM

## 2020-11-25 NOTE — DISCHARGE NOTE NURSING/CASE MANAGEMENT/SOCIAL WORK - PATIENT PORTAL LINK FT
You can access the FollowMyHealth Patient Portal offered by [facility] by registering at the following website: [email or web address]. By joining [employer]’s FollowMyHealth portal, you will also be able to view your health information using other applications (apps) compatible with our system.

## 2020-11-26 RX ORDER — INSULIN ASPART 100 [IU]/ML
7 INJECTION, SOLUTION SUBCUTANEOUS
Qty: 3 | Refills: 0
Start: 2020-11-26 | End: 2020-12-25

## 2020-11-26 RX ORDER — INSULIN GLARGINE 100 [IU]/ML
28 INJECTION, SOLUTION SUBCUTANEOUS
Qty: 9 | Refills: 0
Start: 2020-11-26 | End: 2020-12-25

## 2021-03-04 NOTE — PATIENT PROFILE ADULT - HEALTH LITERACY
Associate Care Manager [facility]) called patient for third attempt to reach after referral received from [facility][note 3]  from high cost claimant. No answer; ACM left HIPAA compliant voicemail message with request for return call. Unable to reach letter previously mailed to patient. ACM signing off unless return call received. Kristina Reed RN BSN  Associate Care Manager  Phone: [phone]  Email: [email or web address]
no

## 2021-05-04 ENCOUNTER — APPOINTMENT (OUTPATIENT)
Dept: NEUROLOGY | Facility: CLINIC | Age: 66
End: 2021-05-04
Payer: MEDICARE

## 2021-05-04 VITALS — DIASTOLIC BLOOD PRESSURE: 90 MMHG | SYSTOLIC BLOOD PRESSURE: 126 MMHG | HEIGHT: 70 IN | TEMPERATURE: 97.6 F

## 2021-05-04 DIAGNOSIS — Z86.39 PERSONAL HISTORY OF OTHER ENDOCRINE, NUTRITIONAL AND METABOLIC DISEASE: ICD-10-CM

## 2021-05-04 DIAGNOSIS — Z86.79 PERSONAL HISTORY OF OTHER DISEASES OF THE CIRCULATORY SYSTEM: ICD-10-CM

## 2021-05-04 PROCEDURE — 99215 OFFICE O/P EST HI 40 MIN: CPT

## 2021-05-04 RX ORDER — INSULIN ASPART 100 [IU]/ML
INJECTION, SOLUTION INTRAVENOUS; SUBCUTANEOUS
Refills: 0 | Status: ACTIVE | COMMUNITY

## 2021-05-04 RX ORDER — TRAMADOL HYDROCHLORIDE 50 MG/1
50 TABLET, COATED ORAL
Refills: 0 | Status: ACTIVE | COMMUNITY

## 2021-05-04 RX ORDER — AMLODIPINE BESYLATE AND BENAZEPRIL HYDROCHLORIDE 5; 20 MG/1; MG/1
5-20 CAPSULE ORAL
Refills: 0 | Status: ACTIVE | COMMUNITY

## 2021-05-04 RX ORDER — SITAGLIPTIN 100 MG/1
TABLET, FILM COATED ORAL
Refills: 0 | Status: ACTIVE | COMMUNITY

## 2021-05-04 RX ORDER — GLIPIZIDE 5 MG/1
5 TABLET, EXTENDED RELEASE ORAL
Refills: 0 | Status: ACTIVE | COMMUNITY

## 2021-05-04 RX ORDER — METFORMIN HYDROCHLORIDE 500 MG/1
500 TABLET, COATED ORAL
Refills: 0 | Status: ACTIVE | COMMUNITY

## 2021-05-04 RX ORDER — ATORVASTATIN CALCIUM 80 MG/1
80 TABLET, FILM COATED ORAL
Refills: 0 | Status: ACTIVE | COMMUNITY

## 2021-05-27 ENCOUNTER — APPOINTMENT (OUTPATIENT)
Dept: PHYSICAL MEDICINE AND REHAB | Facility: CLINIC | Age: 66
End: 2021-05-27

## 2021-11-26 ENCOUNTER — APPOINTMENT (OUTPATIENT)
Dept: NEUROLOGY | Facility: CLINIC | Age: 66
End: 2021-11-26

## 2022-02-02 ENCOUNTER — APPOINTMENT (OUTPATIENT)
Dept: NEUROLOGY | Facility: CLINIC | Age: 67
End: 2022-02-02
Payer: MEDICARE

## 2022-02-02 VITALS — DIASTOLIC BLOOD PRESSURE: 86 MMHG | SYSTOLIC BLOOD PRESSURE: 120 MMHG | HEIGHT: 70 IN

## 2022-02-02 PROCEDURE — 99214 OFFICE O/P EST MOD 30 MIN: CPT

## 2022-02-25 NOTE — ED ADULT NURSE NOTE - CCCP TRG CHIEF CMPLNT
Pt is scheduled with Dr. Parekh for heavy bleeding on 2/28.  She is concerned about her heavy bleeding and wants to know if it is fine to wait until Monday.  She was hoping to be seen today.    She states she is on day 9 of her cycle and still experiencing heavy bleeding.  Pt is changing pads every 3 hours.  Denies light headedness or dizziness.    I explained to pt that it should be fine to wait until her appt on Monday as we do not have any available appts today with an OB/GYN.  I explained to her that if her bleeding increases to where she is soaking through a pad in less than an hour for 2 or more hours in a row and experiencing light headedness/dizziness she would need to be seen in the ED.    Pt verbalized understanding and agreed to plan.    Melani Abdul RN    
Reason for Call:  Other call back    Detailed comments: Pt called and we made an apt for Monday but she would like to speak with a nurse regarding her heavy periods please advise thank you    Phone Number Patient can be reached at: Cell number on file:    Telephone Information:   Mobile [phone]       Best Time: anyitem    Can we leave a detailed message on this number? YES    Call taken on 2/25/2022 at 12:30 PM by Gail Wolf      
weakness

## 2022-08-02 ENCOUNTER — APPOINTMENT (OUTPATIENT)
Dept: NEUROLOGY | Facility: CLINIC | Age: 67
End: 2022-08-02

## 2022-08-02 VITALS — SYSTOLIC BLOOD PRESSURE: 118 MMHG | HEIGHT: 70 IN | DIASTOLIC BLOOD PRESSURE: 76 MMHG

## 2022-08-02 PROCEDURE — 99214 OFFICE O/P EST MOD 30 MIN: CPT

## 2022-08-02 NOTE — DATA REVIEWED
[de-identified] : Brain MRI dated 11/8/20 reports acute infarct right basal ganglia-corona radiata. [de-identified] : Other records reviewed include: \par CTA of the head negative\par CTA of the neck showing right internal carotid artery 50-60% stenosis\par Discharge summary from [facility].\par Neurology notes from the hospital.

## 2022-08-02 NOTE — HISTORY OF PRESENT ILLNESS
[FreeTextEntry1] : She was seen in our office initially 5/for/21 with the following history. She is here with her daughter who serves as an  and independent historian. Patient speaks Creole. Patient had a stroke 11/8/20 with left hemiparesis. Hospital records have been reviewed and summarized below. She went to rehabilitation. She is receiving some outpatient physical therapy. She has a lot of pain in the left shoulder. Left sided weakness persists.\par \par She has history of diabetes and hypertension. She is on a statin. She is supposed to be taking aspirin but has not been taking it.\par \par She returns today, 8/2/22. She is accompanied by her granddaughter who interprets. She is on daily aspirin in addition to other medications as listed for hypertension, diabetes and hyperlipidemia. She is continuing with physical therapy. She persists with left-sided weakness.

## 2022-08-02 NOTE — ASSESSMENT
[FreeTextEntry1] : Status post stroke as above with residual left hemiparesis. I have encouraged her to continue aspirin 81 mg a day and continue other medications as listed. Discussed the importance of controlling stroke risk factors including blood pressure, blood glucose, and cholesterol. She is getting some outpatient physical therapy. She follows with her primary care doctor.\par \par Followup in 6 months, sooner should any issues arise.

## 2022-08-02 NOTE — PHYSICAL EXAM
[General Appearance - Alert] : alert [General Appearance - In No Acute Distress] : in no acute distress [General Appearance - Well-Appearing] : healthy appearing [Oriented To Time, Place, And Person] : oriented to person, place, and time [Impaired Insight] : insight and judgment were intact [Affect] : the affect was normal [Memory Recent] : recent memory was not impaired [Person] : oriented to person [Place] : oriented to place [Time] : oriented to time [Concentration Intact] : normal concentrating ability [Visual Intact] : visual attention was ~T not ~L decreased [Naming Objects] : no difficulty naming common objects [Fluency] : fluency intact [Comprehension] : comprehension intact [Past History] : adequate knowledge of personal past history [Cranial Nerves Optic (II)] : visual acuity intact bilaterally,  visual fields full to confrontation, pupils equal round and reactive to light [Cranial Nerves Oculomotor (III)] : extraocular motion intact [Cranial Nerves Trigeminal (V)] : facial sensation intact symmetrically [Cranial Nerves Facial (VII)] : face symmetrical [Cranial Nerves Vestibulocochlear (VIII)] : hearing was intact bilaterally [Cranial Nerves Glossopharyngeal (IX)] : tongue and palate midline [Cranial Nerves Accessory (XI - Cranial And Spinal)] : head turning and shoulder shrug symmetric [Cranial Nerves Hypoglossal (XII)] : there was no tongue deviation with protrusion [No Muscle Atrophy] : normal bulk in all four extremities [Hemiparesis Of Left Side] : hemiparesis was present on the left [Facial Sparing] : with facial sparing [Motor Strength Upper Extremities Right] : strength was normal in the right upper extremity [Motor Strength Upper Extremities Left] : there was weakness of the left upper extremity [Motor Strength Lower Extremities Right] : strength was normal in the right lower extremity [Motor Strength Lower Extremities Left] : there was weakness of the left lower extremity [Sensation Tactile Decrease] : light touch was intact [Sensation Pain / Temperature Decrease] : pain and temperature was intact [Past-pointing] : there was no past-pointing [Tremor] : no tremor present [Coordination - Dysmetria Impaired Finger-to-Nose Bilateral] : not present [2+] : Ankle jerk left 2+ [Plantar Reflex Right Only] : normal on the right [Plantar Reflex Left Only] : normal on the left [FreeTextEntry6] : Left  arm 2/5 proximally, 0/5 distally. Left leg 4/5.\par Left leg 4+/5 with increased tone. [FreeTextEntry8] : Ambulates with a cane, hemiparetic gait [PERRL With Normal Accommodation] : pupils were equal in size, round, reactive to light, with normal accommodation [Extraocular Movements] : extraocular movements were intact [Full Visual Field] : full visual field

## 2022-08-04 NOTE — OCCUPATIONAL THERAPY INITIAL EVALUATION ADULT - HOME MANAGEMENT SKILLS, PREVIOUS LEVEL OF FUNCTION, OT EVAL
08/04/22 1000   General Information   Type of Visit Initial OP Ortho PT Evaluation   Start of Care Date 08/04/22   Referring Physician Sarath oWlfe MD   Patient/Family Goals Statement get rid of the pain in thoracic and lumbar region   Orders Evaluate and Treat   Orders Comment Dayron Peres, PT, increase thoracic extension, strengthening thoracic extension and gluts, 2x per week x 4 weeks, ROM, mobs/manual therapy, ex, strengthening, HEP STM/MF   Date of Order 07/26/22   Certification Required? Yes   Medical Diagnosis s/p lumbar fusion and decompression, thoracolumbar pain, decreased thoracic and lumbar lordosis, weak gluts, decreased thoracic extension   Surgical/Medical history reviewed Yes   Precautions/Limitations no known precautions/limitations   Body Part(s)   Body Part(s) Lumbar Spine/SI;Cervical Spine   Presentation and Etiology   Pertinent history of current problem (include personal factors and/or comorbidities that impact the POC) Pt notes an increase in thoracic and LBP over the last 4 years. She had L1 to L5 fusion 4 years ago that helped R sciatica sx. She did fall in June landing forward on her L wrist and may have hit her head and has had occipital headaches at night since. She wanted to see Dr Wolfe before he retired and he sent her to PT with the notes above. Pt has a 12 year hx of back pain, hx of R TKA, L TSA. No recent imaging done of her spine, no previous neck imaging. She wears a back brace when active and has some band exercises for UE's like rowing, No meds for her back pain or headaches. Pt goes to bed at 1am, gets up for the day at 4am. Pt is to try PT before she would consider a nerve stimulator which pt really doesn't want. Narcolepsy x 10 years, takes meds.   Impairments A. Pain;N. Headaches;F. Decreased strength and endurance;H. Impaired gait   Functional Limitations perform activities of daily living;perform required work activities;perform desired leisure / sports activities   Symptom  Location B upper traps, B scap/thoracic area to T12, LBP, gets R groin and medial thigh cramping, B calf cramping, occipital pain   How/Where did it occur With a fall;From insidious onset   Onset date of current episode/exacerbation 06/13/22  (recent fall date)   Chronicity Chronic   Pain rating (0-10 point scale) Best (/10);Worst (/10)   Best (/10) average thoracic and LBP 6-7/10, head 3/10   Worst (/10) thoracic sx 8/10, LBP and head 9.5/10 (hasn't been to ED for sx which would be 10/10 sx)   Pain quality A. Sharp;B. Dull;C. Aching;G. Cramping   Frequency of pain/symptoms B. Intermittent  (back pain 90% of day, headache 2-3 hours per day)   Pain/symptoms are: Worse in the morning   Pain/symptoms exacerbated by B. Walking;C. Lifting;G. Certain positions;I. Bending;J. ADL;K. Home tasks;L. Work tasks;M. Other   Pain exacerbation comment standing is bad   Pain/symptoms eased by A. Sitting;C. Rest;E. Changing positions;J. Braces/supports   Progression of symptoms since onset: Unchanged   Prior Level of Function   Prior Level of Function-Mobility ind   Prior Level of Function-ADLs ind   Functional Level Prior Comment likes to make jewelry, read, pain, volunteers 8 hours per week, has 13 grandkids   Current Level of Function   Patient role/employment history F. Retired  (sits 50% of day, on feet 50%, awake 21 hours per day, no naps)   [facility]   Home/community accessibility   (no concerns, lives)   Fall Risk Screen   Fall screen completed by PT   Have you fallen 2 or more times in the past year? No   Have you fallen and had an injury in the past year? Yes   Is patient a fall risk? No   Abuse Screen (yes response referral indicated)   Feels Unsafe at Home or Work/School no   Feels Threatened by Someone no   Does Anyone Try to Keep You From Having Contact with Others or Doing Things Outside Your Home? no   Physical Signs of Abuse Present no   System Outcome Measures   Outcome Measures Low Back Pain  (see Oswestry and Alfie)   Functional Scales   Functional Scales Other   Other Scales  Alfie 4,6, high/JULIO C 42.22%   Lumbar Spine/SI Objective Findings   Gait/Locomotion no assistive device   Flexion ROM will assess more in future sessions   Extension ROM will assess more in future sessions   Lumbar/Hip/Knee/Foot Strength Comments will assess more in future sessions   Posture In standing reduced thoracic kyphosis and lumbar lordosis   Cervical Spine   Cervical Left Side Bending ROM 25% decrease   Cervical Right Rotation ROM 50% decrease   Cervical Left Rotation ROM 50% decrease   Cervical Flexion ROM wnl   Cervical Extension ROM wnl, retraction 50% decrease   Cervical Right Side Bending ROM 25% decrease   Shoulder/Wrist/Hand Strength Comments will assess more in future sessions   Cervical/Shoulder Special Tests Comments Did mechanical exam for directional preference using static/dynamic force analysis testing. In sitting prior to testing pt had no neck, upper trap, thoracic or head sx. Cervical protrusion x 10 no change, cervical retraction to 50% and 100% x 10 no change, sustained cervical extension to 50% and 100% no change in sx.   Palpation tightness and tenderness to palpation B upper traps, thoracic and lumbar paraspinals.   Observation pt shifting postures, very animated when talking   Posture in sitting pt tends to cross R LE over L, holds neck in L sidebent posture, pt with moderate forward head   Planned Therapy Interventions   Planned Therapy Interventions manual therapy;ROM;strengthening;stretching;neuromuscular re-education   Planned Therapy Interventions Comment as ordered above   Planned Modality Interventions   Planned Modality Interventions Comments not likely needed   Clinical Impression   Criteria for Skilled Therapeutic Interventions Met yes, treatment indicated   PT Diagnosis Chronic thoracic and lumbar pain, headaches, s/p L1 to L5 fusion, decreased thoracic kyphosis and lumbar lordosis    Influenced by the following impairments pain, ROM, weakness   Functional limitations due to impairments adl's, standing, walking, bending, lifting   Clinical Presentation Stable/Uncomplicated   Clinical Presentation Rationale clinical judgement   Clinical Decision Making (Complexity) Low complexity   Therapy Frequency 1 time/week   Predicted Duration of Therapy Intervention (days/wks) 8 weeks, then reasses, initial order was 2x per week x 4 weeks but will work better 1x per week x 8 weeks instead   Risk & Benefits of therapy have been explained Yes   Patient, Family & other staff in agreement with plan of care Yes   ORTHO GOALS   PT Ortho Eval Goals 1;2   Ortho Goal 1   Goal Identifier Pain   Goal Description pt will note a decrease in thoracic and lumbar pain from a 6-7/10 to a 3-4/10 or less so she can have improved sitting and standing tolerance with adl's   Target Date 09/22/22   Ortho Goal 2   Goal Identifier Function   Goal Description pt will note a decrease in pain and improve ROM and strength and carry over to improved functional level as measured by JULIO C score decrease from 42.22% to 28% or less   Target Date 09/22/22   Total Evaluation Time   PT Eval, Low Complexity Minutes (38158) 40   Therapy Certification   Certification date from 08/04/22   Certification date to 11/02/22   Medical Diagnosis s/p lumbar fusion and decompression, thoracolumbar pain, decreased thoracic and lumbar lordosis, weak gluts, decreased thoracic extension      independent needed assist

## 2022-08-20 NOTE — ED PROVIDER NOTE - OBJECTIVE STATEMENT
66y/o F with PMHx of DM presents to the ED, with family at bedside for sudden onset of left sided weakness and slurred speech which began 1 hr ago. Family states that pt last known well was this morning at 11am.   Code Stroke 64y/o F with PMHx of DM, HTN presents to the ED, with family at bedside for sudden onset of left sided weakness and slurred speech which began at 7pm. Family states that pt last known well was this morning at 11am.   Code Stroke 65yoF; with PMHx of DM, HTN; now presents to the ED, with family at bedside for sudden onset of left sided weakness and slurred speech which began at 7pm. family states patient with difficulty walking since this morning.  LKW 11am.  family additionally states that around 7pm patient began having left UE and LE weakness and slurred speech. states patient's speech is now improving.  PMH: DM, HTN  SOCIAL: No tobacco/illicit substance use/EtOH No - the patient is unable to be screened due to medical condition

## 2022-12-23 ENCOUNTER — APPOINTMENT (OUTPATIENT)
Dept: NEUROLOGY | Facility: CLINIC | Age: 67
End: 2022-12-23

## 2022-12-23 DIAGNOSIS — I63.9 CEREBRAL INFARCTION, UNSPECIFIED: ICD-10-CM

## 2022-12-23 PROCEDURE — 99214 OFFICE O/P EST MOD 30 MIN: CPT

## 2022-12-23 NOTE — DATA REVIEWED
[de-identified] : Brain MRI dated 11/8/20 reports acute infarct right basal ganglia-corona radiata. [de-identified] : Other records reviewed include: \par CTA of the head negative\par CTA of the neck showing right internal carotid artery 50-60% stenosis\par Discharge summary from [facility].\par Neurology notes from the hospital.

## 2022-12-23 NOTE — CONSULT LETTER
[Dear  ___] : Dear  [unfilled], [Consult Letter:] : I had the pleasure of evaluating your patient, [unfilled]. [Please see my note below.] : Please see my note below. [Consult Closing:] : Thank you very much for allowing me to participate in the care of this patient.  If you have any questions, please do not hesitate to contact me. [Sincerely,] : Sincerely, [FreeTextEntry3] : Mono Garcia MD, PhD, DPN-N\par [facility]\par Neurology at [place]\par Chief, Division of Neurology\par  [facility]\par

## 2022-12-23 NOTE — HISTORY OF PRESENT ILLNESS
[FreeTextEntry1] : She was seen in our office initially 5/4/21 with the following history. She is here with her daughter who serves as an  and independent historian. Patient speaks Creole. Patient had a stroke 11/8/20 with left hemiparesis. Hospital records have been reviewed and summarized below. She went to rehabilitation. She is receiving some outpatient physical therapy. She has a lot of pain in the left shoulder. Left sided weakness persists.\par \par She has history of diabetes and hypertension. She is on a statin. \par \par She returns today. She is accompanied by her grandson who interprets. She is on daily aspirin in addition to other medications as listed for hypertension, diabetes and hyperlipidemia. She is continuing with physical therapy. She persists with left-sided weakness.

## 2022-12-23 NOTE — PHYSICAL EXAM
[General Appearance - Alert] : alert [General Appearance - In No Acute Distress] : in no acute distress [General Appearance - Well-Appearing] : healthy appearing [Oriented To Time, Place, And Person] : oriented to person, place, and time [Impaired Insight] : insight and judgment were intact [Affect] : the affect was normal [Memory Recent] : recent memory was not impaired [Person] : oriented to person [Place] : oriented to place [Time] : oriented to time [Concentration Intact] : normal concentrating ability [Visual Intact] : visual attention was ~T not ~L decreased [Naming Objects] : no difficulty naming common objects [Fluency] : fluency intact [Comprehension] : comprehension intact [Past History] : adequate knowledge of personal past history [Cranial Nerves Optic (II)] : visual acuity intact bilaterally,  visual fields full to confrontation, pupils equal round and reactive to light [Cranial Nerves Oculomotor (III)] : extraocular motion intact [Cranial Nerves Trigeminal (V)] : facial sensation intact symmetrically [Cranial Nerves Facial (VII)] : face symmetrical [Cranial Nerves Vestibulocochlear (VIII)] : hearing was intact bilaterally [Cranial Nerves Glossopharyngeal (IX)] : tongue and palate midline [Cranial Nerves Accessory (XI - Cranial And Spinal)] : head turning and shoulder shrug symmetric [Cranial Nerves Hypoglossal (XII)] : there was no tongue deviation with protrusion [No Muscle Atrophy] : normal bulk in all four extremities [Hemiparesis Of Left Side] : hemiparesis was present on the left [Facial Sparing] : with facial sparing [Motor Strength Upper Extremities Right] : strength was normal in the right upper extremity [Motor Strength Upper Extremities Left] : there was weakness of the left upper extremity [Motor Strength Lower Extremities Right] : strength was normal in the right lower extremity [Motor Strength Lower Extremities Left] : there was weakness of the left lower extremity [Sensation Tactile Decrease] : light touch was intact [Sensation Pain / Temperature Decrease] : pain and temperature was intact [Past-pointing] : there was no past-pointing [Tremor] : no tremor present [Coordination - Dysmetria Impaired Finger-to-Nose Bilateral] : not present [2+] : Ankle jerk left 2+ [Plantar Reflex Right Only] : normal on the right [Plantar Reflex Left Only] : normal on the left [FreeTextEntry6] : Left  arm 3/5 proximally, 0/5 distally. Left leg 4/5.\par Left leg 4+/5 with increased tone. [FreeTextEntry8] : Ambulates with a cane, hemiparetic gait [PERRL With Normal Accommodation] : pupils were equal in size, round, reactive to light, with normal accommodation [Extraocular Movements] : extraocular movements were intact [Full Visual Field] : full visual field

## 2023-04-14 ENCOUNTER — APPOINTMENT (OUTPATIENT)
Dept: NEUROLOGY | Facility: CLINIC | Age: 68
End: 2023-04-14

## 2023-05-10 NOTE — CONSULT NOTE ADULT - NSTELEHEALTH_GEN_ALL_CORE
[address]  coding opportunities       Chart reviewed, no opportunity found: CHART REVIEWED, NO OPPORTUNITY FOUND        Patients Insurance     Medicare Insurance: Medicare No

## 2023-06-23 ENCOUNTER — APPOINTMENT (OUTPATIENT)
Dept: NEUROLOGY | Facility: CLINIC | Age: 68
End: 2023-06-23

## 2023-07-03 ENCOUNTER — OFFICE (OUTPATIENT)
Dept: URBAN - METROPOLITAN AREA CLINIC 6 | Facility: CLINIC | Age: 68
Setting detail: OPHTHALMOLOGY
End: 2023-07-03
Payer: MEDICARE

## 2023-07-03 DIAGNOSIS — H25.13: ICD-10-CM

## 2023-07-03 DIAGNOSIS — E11.3313: ICD-10-CM

## 2023-07-03 DIAGNOSIS — I63.50: ICD-10-CM

## 2023-07-03 PROCEDURE — 92133 CPTRZD OPH DX IMG PST SGM ON: CPT

## 2023-07-03 PROCEDURE — 92250 FUNDUS PHOTOGRAPHY W/I&R: CPT

## 2023-07-03 PROCEDURE — 92004 COMPRE OPH EXAM NEW PT 1/>: CPT

## 2023-07-03 ASSESSMENT — REFRACTION_MANIFEST
OD_CYLINDER: -1.00
OS_VA1: 20/150
OD_VA1: 20/80
OS_SPHERE: -1.00
OD_AXIS: 082
OS_CYLINDER: -0.75
OD_AXIS: 90
OS_CYLINDER: -0.75
OD_ADD: +2.25
OS_VA1: 20/50
OD_ADD: +2.50
OD_CYLINDER: -1.00
OD_SPHERE: -1.00
OS_SPHERE: -1.50
OS_ADD: +2.25
OD_SPHERE: -2.00
OS_AXIS: 082
OS_ADD: +2.50
OD_VA1: 20/50-1

## 2023-07-03 ASSESSMENT — SPHEQUIV_DERIVED
OD_SPHEQUIV: -1.5
OD_SPHEQUIV: -2.5
OS_SPHEQUIV: -1.375
OS_SPHEQUIV: -1.875
OS_SPHEQUIV: -1.875

## 2023-07-03 ASSESSMENT — REFRACTION_AUTOREFRACTION
OS_SPHERE: -1.50
OS_CYLINDER: -0.75
OS_AXIS: 175
OD_SPHERE: UNABLE

## 2023-07-03 ASSESSMENT — KERATOMETRY
OS_K1POWER_DIOPTERS: 44.25
OD_K2POWER_DIOPTERS: 44.75
OD_K1POWER_DIOPTERS: 40.50
OS_AXISANGLE_DEGREES: 005
OD_AXISANGLE_DEGREES: 180
OS_K2POWER_DIOPTERS: 45.00
METHOD_AUTO_MANUAL: AUTO

## 2023-07-03 ASSESSMENT — AXIALLENGTH_DERIVED
OD_AL: 24.5339
OS_AL: 23.9135
OD_AL: 24.9626
OS_AL: 23.715
OS_AL: 23.9135

## 2023-07-03 ASSESSMENT — TONOMETRY
OD_IOP_MMHG: 13
OS_IOP_MMHG: 13

## 2023-07-03 ASSESSMENT — VISUAL ACUITY
OS_BCVA: 20/600
OD_BCVA: 20/300

## 2023-07-03 ASSESSMENT — CONFRONTATIONAL VISUAL FIELD TEST (CVF)
OD_FINDINGS: FULL
OS_FINDINGS: FULL

## 2023-07-20 ENCOUNTER — OFFICE (OUTPATIENT)
Dept: URBAN - METROPOLITAN AREA CLINIC 115 | Facility: CLINIC | Age: 68
Setting detail: OPHTHALMOLOGY
End: 2023-07-20
Payer: MEDICARE

## 2023-07-20 DIAGNOSIS — E11.3313: ICD-10-CM

## 2023-07-20 DIAGNOSIS — E11.3311: ICD-10-CM

## 2023-07-20 DIAGNOSIS — H35.033: ICD-10-CM

## 2023-07-20 DIAGNOSIS — E11.3312: ICD-10-CM

## 2023-07-20 PROBLEM — H52.7 REFRACTIVE ERROR: Status: ACTIVE | Noted: 2023-07-03

## 2023-07-20 PROBLEM — H25.13 CATARACT SENILE NUCLEAR SCLEROSIS; BOTH EYES: Status: ACTIVE | Noted: 2023-07-20

## 2023-07-20 PROBLEM — I63.50 STROKE/CEREBRAL ACCIDENT: Status: ACTIVE | Noted: 2023-07-03

## 2023-07-20 PROCEDURE — 67028 INJECTION EYE DRUG: CPT | Performed by: SPECIALIST

## 2023-07-20 PROCEDURE — 92134 CPTRZ OPH DX IMG PST SGM RTA: CPT | Performed by: SPECIALIST

## 2023-07-20 PROCEDURE — 92235 FLUORESCEIN ANGRPH MLTIFRAME: CPT | Performed by: SPECIALIST

## 2023-07-20 ASSESSMENT — REFRACTION_AUTOREFRACTION
OS_CYLINDER: -0.75
OD_SPHERE: UNABLE
OS_AXIS: 175
OS_SPHERE: -1.50

## 2023-07-20 ASSESSMENT — SPHEQUIV_DERIVED
OS_SPHEQUIV: -1.875
OD_SPHEQUIV: -1.5
OS_SPHEQUIV: -1.375
OS_SPHEQUIV: -1.875
OD_SPHEQUIV: -2.5

## 2023-07-20 ASSESSMENT — REFRACTION_MANIFEST
OS_SPHERE: -1.50
OD_CYLINDER: -1.00
OS_VA1: 20/50
OS_ADD: +2.50
OD_ADD: +2.25
OS_CYLINDER: -0.75
OS_ADD: +2.25
OD_VA1: 20/50-1
OD_AXIS: 082
OS_AXIS: 082
OD_ADD: +2.50
OD_SPHERE: -2.00
OD_SPHERE: -1.00
OS_CYLINDER: -0.75
OS_VA1: 20/150
OD_AXIS: 90
OD_VA1: 20/80
OD_CYLINDER: -1.00
OS_SPHERE: -1.00

## 2023-07-20 ASSESSMENT — AXIALLENGTH_DERIVED
OS_AL: 23.715
OS_AL: 23.9135
OS_AL: 23.9135
OD_AL: 24.9626
OD_AL: 24.5339

## 2023-07-20 ASSESSMENT — CONFRONTATIONAL VISUAL FIELD TEST (CVF)
OS_FINDINGS: FULL
OD_FINDINGS: FULL

## 2023-07-20 ASSESSMENT — KERATOMETRY
OD_AXISANGLE_DEGREES: 180
OS_K1POWER_DIOPTERS: 44.25
OS_K2POWER_DIOPTERS: 45.00
METHOD_AUTO_MANUAL: AUTO
OD_K1POWER_DIOPTERS: 40.50
OS_AXISANGLE_DEGREES: 005
OD_K2POWER_DIOPTERS: 44.75

## 2023-07-20 ASSESSMENT — TONOMETRY
OS_IOP_MMHG: 12
OD_IOP_MMHG: 12

## 2023-07-20 ASSESSMENT — VISUAL ACUITY
OD_BCVA: 20/250
OS_BCVA: 20/100

## 2023-09-28 ENCOUNTER — OFFICE (OUTPATIENT)
Dept: URBAN - METROPOLITAN AREA CLINIC 115 | Facility: CLINIC | Age: 68
Setting detail: OPHTHALMOLOGY
End: 2023-09-28
Payer: MEDICARE

## 2023-09-28 DIAGNOSIS — H35.033: ICD-10-CM

## 2023-09-28 DIAGNOSIS — E11.3313: ICD-10-CM

## 2023-09-28 PROBLEM — E11.3312 DM TYPE 2; RIGHT MOD WITH ME, LEFT MOD WITH ME: Status: ACTIVE | Noted: 2023-09-28

## 2023-09-28 PROBLEM — E11.3311 DM TYPE 2; RIGHT MOD WITH ME, LEFT MOD WITH ME: Status: ACTIVE | Noted: 2023-09-28

## 2023-09-28 PROCEDURE — 92134 CPTRZ OPH DX IMG PST SGM RTA: CPT | Performed by: OPHTHALMOLOGY

## 2023-09-28 PROCEDURE — 67028 INJECTION EYE DRUG: CPT | Performed by: OPHTHALMOLOGY

## 2023-09-28 ASSESSMENT — TONOMETRY
OS_IOP_MMHG: 12
OD_IOP_MMHG: 13

## 2023-09-28 ASSESSMENT — REFRACTION_AUTOREFRACTION
OS_AXIS: 175
OS_SPHERE: -1.50
OD_SPHERE: UNABLE
OS_CYLINDER: -0.75

## 2023-09-28 ASSESSMENT — SPHEQUIV_DERIVED: OS_SPHEQUIV: -1.875

## 2023-09-28 ASSESSMENT — KERATOMETRY
OD_K2POWER_DIOPTERS: 44.75
OS_K1POWER_DIOPTERS: 44.25
OS_AXISANGLE_DEGREES: 005
OD_AXISANGLE_DEGREES: 180
METHOD_AUTO_MANUAL: AUTO
OD_K1POWER_DIOPTERS: 40.50
OS_K2POWER_DIOPTERS: 45.00

## 2023-09-28 ASSESSMENT — AXIALLENGTH_DERIVED: OS_AL: 23.9135

## 2023-09-28 ASSESSMENT — CONFRONTATIONAL VISUAL FIELD TEST (CVF)
OD_FINDINGS: FULL
OS_FINDINGS: FULL

## 2023-09-28 ASSESSMENT — VISUAL ACUITY
OD_BCVA: 20/250
OS_BCVA: 20/70-1

## 2023-11-06 ENCOUNTER — OFFICE (OUTPATIENT)
Dept: URBAN - METROPOLITAN AREA CLINIC 115 | Facility: CLINIC | Age: 68
Setting detail: OPHTHALMOLOGY
End: 2023-11-06
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
## 2023-11-06 PROCEDURE — NO SHOW FE NO SHOW FEE: Performed by: OPHTHALMOLOGY

## 2023-11-09 ENCOUNTER — OFFICE (OUTPATIENT)
Dept: URBAN - METROPOLITAN AREA CLINIC 115 | Facility: CLINIC | Age: 68
Setting detail: OPHTHALMOLOGY
End: 2023-11-09
Payer: MEDICARE

## 2023-11-09 DIAGNOSIS — E11.3313: ICD-10-CM

## 2023-11-09 DIAGNOSIS — H35.033: ICD-10-CM

## 2023-11-09 PROCEDURE — 92134 CPTRZ OPH DX IMG PST SGM RTA: CPT | Performed by: OPHTHALMOLOGY

## 2023-11-09 PROCEDURE — 92014 COMPRE OPH EXAM EST PT 1/>: CPT | Mod: 25 | Performed by: OPHTHALMOLOGY

## 2023-11-09 PROCEDURE — 67028 INJECTION EYE DRUG: CPT | Mod: 50 | Performed by: OPHTHALMOLOGY

## 2023-11-09 ASSESSMENT — REFRACTION_AUTOREFRACTION
OS_CYLINDER: -0.75
OS_SPHERE: -1.50
OD_SPHERE: UNABLE
OS_AXIS: 175

## 2023-11-09 ASSESSMENT — CONFRONTATIONAL VISUAL FIELD TEST (CVF)
OS_FINDINGS: FULL
OD_FINDINGS: FULL

## 2023-11-09 ASSESSMENT — SPHEQUIV_DERIVED: OS_SPHEQUIV: -1.875

## 2023-11-21 ENCOUNTER — OFFICE (OUTPATIENT)
Dept: URBAN - METROPOLITAN AREA CLINIC 6 | Facility: CLINIC | Age: 68
Setting detail: OPHTHALMOLOGY
End: 2023-11-21
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
## 2023-11-21 PROCEDURE — NO SHOW FE NO SHOW FEE: Performed by: OPHTHALMOLOGY

## 2023-12-28 ENCOUNTER — OFFICE (OUTPATIENT)
Dept: URBAN - METROPOLITAN AREA CLINIC 115 | Facility: CLINIC | Age: 68
Setting detail: OPHTHALMOLOGY
End: 2023-12-28
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
## 2023-12-28 PROCEDURE — NO SHOW FE NO SHOW FEE: Performed by: OPHTHALMOLOGY

## 2024-03-12 ENCOUNTER — OFFICE (OUTPATIENT)
Dept: URBAN - METROPOLITAN AREA CLINIC 6 | Facility: CLINIC | Age: 69
Setting detail: OPHTHALMOLOGY
End: 2024-03-12
Payer: MEDICARE

## 2024-03-12 DIAGNOSIS — H35.033: ICD-10-CM

## 2024-03-12 DIAGNOSIS — E11.3312: ICD-10-CM

## 2024-03-12 DIAGNOSIS — H25.13: ICD-10-CM

## 2024-03-12 DIAGNOSIS — E11.3311: ICD-10-CM

## 2024-03-12 PROCEDURE — 99214 OFFICE O/P EST MOD 30 MIN: CPT | Performed by: OPHTHALMOLOGY

## 2024-07-10 ENCOUNTER — APPOINTMENT (OUTPATIENT)
Dept: NEUROLOGY | Facility: CLINIC | Age: 69
End: 2024-07-10

## 2024-09-05 NOTE — PATIENT PROFILE ADULT - DO YOU LACK THE NECESSARY SUPPORT TO HELP YOU COPE WITH LIFE CHALLENGES?
Cardiology Clinic - New Patient Visit  [place] [id number]  [address]  [address]  [facility] [address]  Dept Phone: [phone]      Monica Wiley  : 1949  PCP: Janet Sawyer MD  Referring Physician: Janet Sawyer MD    Dear Dr. Janet Sawyer MD, We had the pleasure of seeing Monica Wiley in the [facility] [place]. Thank you for allowing me to see this patient in the office.     Reason for Consultation:   Chief Complaint   Patient presents with   • ER F/U   • Dizziness   • Cardiomyopathy     Assessment:  Stress-induced cardiomyopathy  LVEF 35% -- improved to 67% over one month -- remained 67% 22  Statin-intolerance (myalgias)  CAC 0  Recent vertigo exacerbation with \"up and down\" heart rates in ED    Plan:  Repeat TTE  Event monitor  Continue exercise  If above testing reassuring, can f/u PRN    The patient's previous laboratory and cardiac diagnostic testing listed below has been reviewed and was utilized to establish my current plan of care.  Previous outside notes were reviewed and taken into consideration when deciding further treatment.    I personally reviewed: EKG, labs, TTE report, cath report    Return to Clinic: Return if symptoms worsen or fail to improve. Patient instructed to have all ordered testing prior to follow-up visit.    Last LDL:   LDL (mg/dL)   Date Value   2024 148 (H)     History of Present Illness:  75 year old woman who presents for follow-up.    Recent vertigo. When in ED she was told her heart rates were up and down a lot. No palpitations. No other complaints. Still active. Stopped BB over a year ago.     Review of Systems:  A 12-point Review of Systems was performed and is negative except for HPI.     Physical Exam:  Visit Vitals  /70 (BP Location: LUE - Left upper extremity, Patient Position: Sitting, Cuff Size: Regular)   Pulse 66   Ht 5' 5.5\" (1.664 m)   Wt 69.9 kg (154 lb)   SpO2 96%   BMI 25.24  kg/m²     Wt Readings from Last 4 Encounters:   09/05/24 69.9 kg (154 lb)   08/31/24 68 kg (150 lb)   05/19/24 68 kg (150 lb)   05/16/24 70.8 kg (156 lb)     Vital signs and previous weights were reviewed during today's visit.    Gen: no acute distress  Head: Atraumatic, normocephalic  HEENT: No apparent abnormality  Neck: Supple, no JVP, no carotid bruit  CV: Regular rate and rhythm. Normal S1/S2. No murmur.   Chest: Breathing comfortably. Clear to auscultation bilaterally.   GI: soft, non-tender, non-distended   MSK: No abnormalities, no deformity  Neuro: no gross focal deficit  Ext: warm, well perfused, no LE edema  Psych: Cooperative, appropriate  Skin: Warm, Dry    Past Medical History:  Past Medical History:   Diagnosis Date   • Acute on chronic systolic heart failure  (CMD) 9/28/2021   • Cataract    • Heart failure  (CMD)     stress induced Takasubo's diagnosed 9/15/2021 coronary angio 10/16 normal       Past Surgical History:  Past Surgical History:   Procedure Laterality Date   • Bowel resection     • Cardiac catherization     • Eye surgery  11/01/2021    right cataract extraction and lens implant   • Hysterectomy         Family History:  Family History   Problem Relation Age of Onset   • Diabetes Mother    • Heart disease Mother    • Cancer Father         lung and kidney   • Patient is unaware of any medical problems Sister    • Patient is unaware of any medical problems Brother    • Aneurysm Neg Hx    • Coronary Artery Disease Neg Hx        Social History:  she  reports that she has quit smoking. Her smoking use included cigarettes. She has never used smokeless tobacco. She reports current alcohol use. She reports that she does not currently use drugs.    Allergies:  ALLERGIES:   Allergen Reactions   • Contrast Media RASH   • Sacubitril-Valsartan GI UPSET       Medication List:  Current Medications    ESTRADIOL (ESTRACE) 1 MG TABLET    TAKE ONE TABLET BY MOUTH ONE TIME DAILY    MECLIZINE (ANTIVERT) 25 MG  TABLET    Take 1 tablet by mouth 3 times daily as needed for Dizziness.    METHYLPREDNISOLONE (MEDROL DOSEPAK) 4 MG TABLET    Take as directed    OFLOXACIN (OCUFLOX) 0.3 % OPHTHALMIC SOLUTION    Place 3 drops into both eyes 4 times daily.    ONDANSETRON (ZOFRAN ODT) 4 MG DISINTEGRATING TABLET    Place 1 tablet onto the tongue every 6 hours.         Monica's PMH, PSH, Family Hx, Social Hx, Allergies, and Medications were reviewed with the patient and updated during today's visit. In addition, I discussed medication dosage, usage, goals of therapy, and side effects with her.    Cardiovascular Diagnostic Studies:  LAST EKG:    Encounter Date: 08/31/24   Electrocardiogram 12-Lead   Result Value    Ventricular Rate EKG/Min (BPM) 47    Atrial Rate (BPM) 47    VA-Interval (MSEC) 222    QRS-Interval (MSEC) 78    QT-Interval (MSEC) 476    QTc 421    P Axis (Degrees) 76    R Axis (Degrees) -34    T Axis (Degrees) 47    REPORT TEXT      Sinus bradycardia  with 1st degree AV block  Left axis deviation  Septal infarct  (cited on or before  15-DEC-2021)  Abnormal ECG  When compared with ECG of  15-DEC-2021 07:11,  VA interval  has increased  Nonspecific T wave abnormality, improved in  Anterolateral leads  Confirmed by EMMY WALKER MD ([id number]) on 9/1/2024 11:57:05 AM         LAST ECHO/ECHO STRESS:  No results found for this or any previous visit.      CATH REPORT:  No results found for this or any previous visit.    STRESS TEST:   No results found for this or any previous visit.    NUCLEAR STRESS TEST:   No results found for this or any previous visit.    No orders to display        Labs:  Recent Labs     05/29/24  1054 08/31/24  1501   CHOLESTEROL 262*  --    CALCLDL 148*  --    HDL 69  --    TRIGLYCERIDE 226*  --    AST 22 25   SODIUM 138 139   CHLORIDE 105 109   BUN 13 12   POTASSIUM 4.3 3.9   GLUCOSE 88 97   CREATININE 0.79 0.70   CALCIUM 9.6 9.0         Recent Labs     05/29/24  1054 08/31/24  1501   WBC 5.7 6.9   RBC 4.52  4.36   HGB 14.0 13.5   HCT 41.3 39.9   MCV 91.4 91.5   MCHC 33.9 33.8   RDWCV 12.6 11.9    228   TLYMPH 37 44         Diagnosis:  Patient Active Problem List   Diagnosis   • Stress-induced cardiomyopathy   • Hyperlipidemia, mixed   • Migraine aura without headache   • Preoperative clearance   • Vertigo   • Encounter for Medicare annual wellness exam   • Takotsubo cardiomyopathy   • Dermatochalasis of both upper eyelids   • Senile ectropion of both lower eyelids   • Lichen planus       Orders Placed During This Encounter:  Orders Placed This Encounter   • 2D Echo With Doppler & Color Flow   • Event/Mobile Cardiac Telemetry Monitor        Thank you for allowing me to see this patient in our office. Please call our office with any questions.    Pierce Baumann MD, [facility]  Interventional Cardiology  [facility] [place]   yes